# Patient Record
Sex: FEMALE | Race: WHITE | Employment: OTHER | URBAN - NONMETROPOLITAN AREA
[De-identification: names, ages, dates, MRNs, and addresses within clinical notes are randomized per-mention and may not be internally consistent; named-entity substitution may affect disease eponyms.]

---

## 2017-01-12 ENCOUNTER — TELEPHONE (OUTPATIENT)
Dept: OTHER | Age: 67
End: 2017-01-12

## 2017-01-12 ENCOUNTER — OFFICE VISIT (OUTPATIENT)
Dept: OTHER | Age: 67
End: 2017-01-12

## 2017-01-12 VITALS
WEIGHT: 115.6 LBS | HEART RATE: 51 BPM | DIASTOLIC BLOOD PRESSURE: 52 MMHG | BODY MASS INDEX: 24.16 KG/M2 | SYSTOLIC BLOOD PRESSURE: 91 MMHG

## 2017-01-12 DIAGNOSIS — E10.8 TYPE 1 DIABETES MELLITUS WITH COMPLICATION (HCC): Primary | ICD-10-CM

## 2017-01-12 PROCEDURE — 3044F HG A1C LEVEL LT 7.0%: CPT | Performed by: NURSE PRACTITIONER

## 2017-01-12 PROCEDURE — G8427 DOCREV CUR MEDS BY ELIG CLIN: HCPCS | Performed by: NURSE PRACTITIONER

## 2017-01-12 PROCEDURE — G8400 PT W/DXA NO RESULTS DOC: HCPCS | Performed by: NURSE PRACTITIONER

## 2017-01-12 PROCEDURE — 1036F TOBACCO NON-USER: CPT | Performed by: NURSE PRACTITIONER

## 2017-01-12 PROCEDURE — 3014F SCREEN MAMMO DOC REV: CPT | Performed by: NURSE PRACTITIONER

## 2017-01-12 PROCEDURE — G8482 FLU IMMUNIZE ORDER/ADMIN: HCPCS | Performed by: NURSE PRACTITIONER

## 2017-01-12 PROCEDURE — 4040F PNEUMOC VAC/ADMIN/RCVD: CPT | Performed by: NURSE PRACTITIONER

## 2017-01-12 PROCEDURE — 1090F PRES/ABSN URINE INCON ASSESS: CPT | Performed by: NURSE PRACTITIONER

## 2017-01-12 PROCEDURE — G8420 CALC BMI NORM PARAMETERS: HCPCS | Performed by: NURSE PRACTITIONER

## 2017-01-12 PROCEDURE — 99214 OFFICE O/P EST MOD 30 MIN: CPT | Performed by: NURSE PRACTITIONER

## 2017-01-12 PROCEDURE — 1123F ACP DISCUSS/DSCN MKR DOCD: CPT | Performed by: NURSE PRACTITIONER

## 2017-01-12 PROCEDURE — 3017F COLORECTAL CA SCREEN DOC REV: CPT | Performed by: NURSE PRACTITIONER

## 2017-01-12 ASSESSMENT — ENCOUNTER SYMPTOMS
WHEEZING: 0
COUGH: 0
SHORTNESS OF BREATH: 0
ABDOMINAL PAIN: 0
CHEST TIGHTNESS: 0
PHOTOPHOBIA: 0
EYE PAIN: 0
STRIDOR: 0
ABDOMINAL DISTENTION: 0

## 2017-01-25 ENCOUNTER — PATIENT MESSAGE (OUTPATIENT)
Dept: OTHER | Age: 67
End: 2017-01-25

## 2017-02-13 LAB
ALBUMIN SERPL-MCNC: 3.8 G/DL
ALP BLD-CCNC: 28 U/L
ALT SERPL-CCNC: 26 U/L
AST SERPL-CCNC: 43 U/L
BILIRUB SERPL-MCNC: 0.8 MG/DL (ref 0.1–1.4)
BUN BLDV-MCNC: 21 MG/DL
CALCIUM SERPL-MCNC: 9.7 MG/DL
CHLORIDE BLD-SCNC: 104 MMOL/L
CO2: 29 MMOL/L
CREAT SERPL-MCNC: 1.4 MG/DL
CREATININE, RANDOM URINE: 65.6
GFR CALCULATED: 38
GLUCOSE BLD-MCNC: 119 MG/DL
HBA1C MFR BLD: 5.7 %
POTASSIUM SERPL-SCNC: 4.2 MMOL/L
PROTEIN, URINE, RANDOM: <6
SODIUM BLD-SCNC: 142 MMOL/L
TOTAL PROTEIN: 6
TSH SERPL DL<=0.05 MIU/L-ACNC: 59.28 UIU/ML

## 2017-03-09 ENCOUNTER — OFFICE VISIT (OUTPATIENT)
Dept: OTHER | Age: 67
End: 2017-03-09

## 2017-03-09 VITALS
WEIGHT: 117.6 LBS | BODY MASS INDEX: 24.68 KG/M2 | SYSTOLIC BLOOD PRESSURE: 106 MMHG | HEIGHT: 58 IN | DIASTOLIC BLOOD PRESSURE: 58 MMHG

## 2017-03-09 DIAGNOSIS — E10.8 TYPE 1 DIABETES MELLITUS WITH COMPLICATION (HCC): Primary | ICD-10-CM

## 2017-03-29 ENCOUNTER — TELEPHONE (OUTPATIENT)
Dept: OTHER | Age: 67
End: 2017-03-29

## 2017-06-28 ENCOUNTER — OFFICE VISIT (OUTPATIENT)
Dept: OTHER | Age: 67
End: 2017-06-28

## 2017-06-28 DIAGNOSIS — E10.8 TYPE 1 DIABETES MELLITUS WITH COMPLICATION (HCC): Primary | ICD-10-CM

## 2017-06-29 VITALS
WEIGHT: 114.8 LBS | HEIGHT: 58 IN | DIASTOLIC BLOOD PRESSURE: 64 MMHG | BODY MASS INDEX: 24.1 KG/M2 | SYSTOLIC BLOOD PRESSURE: 116 MMHG

## 2017-06-29 DIAGNOSIS — E10.8 TYPE 1 DIABETES MELLITUS WITH COMPLICATION (HCC): Primary | ICD-10-CM

## 2017-08-24 LAB
ALT SERPL-CCNC: 28 U/L
AST SERPL-CCNC: 49 U/L
AVERAGE GLUCOSE: NORMAL
BASOPHILS ABSOLUTE: ABNORMAL /ΜL
BASOPHILS RELATIVE PERCENT: ABNORMAL %
BUN BLDV-MCNC: 25 MG/DL
CALCIUM SERPL-MCNC: 38 MG/DL
CHLORIDE BLD-SCNC: 107 MMOL/L
CHOLESTEROL, TOTAL: 103 MG/DL
CHOLESTEROL/HDL RATIO: NORMAL
CO2: NORMAL MMOL/L
CREAT SERPL-MCNC: 1.4 MG/DL
EOSINOPHILS ABSOLUTE: ABNORMAL /ΜL
EOSINOPHILS RELATIVE PERCENT: ABNORMAL %
GFR CALCULATED: NORMAL
GLUCOSE BLD-MCNC: 97 MG/DL
HBA1C MFR BLD: 5.9 %
HCT VFR BLD CALC: 34.5 % (ref 36–46)
HDLC SERPL-MCNC: 57 MG/DL (ref 35–70)
HEMOGLOBIN: 12 G/DL (ref 12–16)
LDL CHOLESTEROL CALCULATED: 30 MG/DL (ref 0–160)
LYMPHOCYTES ABSOLUTE: ABNORMAL /ΜL
LYMPHOCYTES RELATIVE PERCENT: ABNORMAL %
MCH RBC QN AUTO: ABNORMAL PG
MCHC RBC AUTO-ENTMCNC: ABNORMAL G/DL
MCV RBC AUTO: ABNORMAL FL
MONOCYTES ABSOLUTE: ABNORMAL /ΜL
MONOCYTES RELATIVE PERCENT: ABNORMAL %
NEUTROPHILS ABSOLUTE: ABNORMAL /ΜL
NEUTROPHILS RELATIVE PERCENT: ABNORMAL %
PLATELET # BLD: 135 K/ΜL
PMV BLD AUTO: ABNORMAL FL
POTASSIUM SERPL-SCNC: 4.4 MMOL/L
RBC # BLD: ABNORMAL 10^6/ΜL
SODIUM BLD-SCNC: 145 MMOL/L
TRIGL SERPL-MCNC: 81 MG/DL
VLDLC SERPL CALC-MCNC: NORMAL MG/DL
WBC # BLD: 3.9 10^3/ML

## 2017-10-30 ENCOUNTER — HOSPITAL ENCOUNTER (OUTPATIENT)
Dept: PHARMACY | Age: 67
Setting detail: THERAPIES SERIES
Discharge: HOME | End: 2017-10-30
Payer: MEDICARE

## 2017-10-30 VITALS
WEIGHT: 110.8 LBS | BODY MASS INDEX: 23.26 KG/M2 | HEIGHT: 58 IN | SYSTOLIC BLOOD PRESSURE: 104 MMHG | DIASTOLIC BLOOD PRESSURE: 52 MMHG

## 2017-10-30 PROCEDURE — G0108 DIAB MANAGE TRN  PER INDIV: HCPCS | Performed by: REGISTERED NURSE

## 2017-10-30 RX ORDER — RANITIDINE 150 MG/1
150 TABLET ORAL 2 TIMES DAILY
COMMUNITY
End: 2019-03-13 | Stop reason: ALTCHOICE

## 2017-10-30 NOTE — PROGRESS NOTES
of toe knuckles -no redness noted. She does have mild hammer toes bilat  Immunizations up to date: yes - 2017  Taking ASA:  Yes   Appropriate for use of MyChart Glucose Grid:  No    Focus: Folllow up visit. Glucose levels are in tight control; recent A1C 5.9%. Samantha Lewis states she does not want the numbers any higher. Discussed safety (risk of low BS) especially during the night. Tobin Saha agreed to small decrease in basal insulin during the night. Samantha Lewis reports sleeping poorly and appetite is poor. Weight is down approx 4# from last visit. Encouraged further discussion with PCP- ? sleep aid? Re-evaluate depression? Encouragement given. Follow up 3 months. DSME PLAN:   Discussed general issues about diabetes pathophysiology and management. Counseling at today's visit: BG goals; preventing and treating low BS; interaction of mental and physical health. 1. Decrease basal rate 2am 0.15units and 5:30am  0.65 units  2. Try to get on a schedule  -- up by 10am and to bed by 1-2am.                          Try to get a 30  Minute nap                        When [employer] gets you up at 10am--get out of bed right away  3. Set a goal to ride your bike for 10-15 minutes every day. Slow/ steady pace  4. Talk to Dr. Cleopatra Barros --do you need a sleep aid? Consider seeing a psychiatrist or phycologist to be sure your depression is in good control    Meter download, medications, PMH and nursing assessment reviewed with Huber Sanchez, Pharm Melany Turner states She is willing to participate in this plan of care and verbalized understanding of all instructions provided. Teach back used to verify comprehension. Total time involved in direct patient education: 60 minutes.

## 2018-04-04 LAB
ALBUMIN SERPL-MCNC: 3.8 G/DL
ALP BLD-CCNC: 36 U/L
ALT SERPL-CCNC: 23 U/L
ANION GAP SERPL CALCULATED.3IONS-SCNC: NORMAL MMOL/L
AST SERPL-CCNC: 44 U/L
AVERAGE GLUCOSE: 111
BASOPHILS ABSOLUTE: ABNORMAL /ΜL
BASOPHILS RELATIVE PERCENT: 1.1 %
BILIRUB SERPL-MCNC: 1.1 MG/DL (ref 0.1–1.4)
BUN BLDV-MCNC: 24 MG/DL
CALCIUM SERPL-MCNC: 9.7 MG/DL
CHLORIDE BLD-SCNC: 104 MMOL/L
CHOLESTEROL, TOTAL: 108 MG/DL
CHOLESTEROL/HDL RATIO: ABNORMAL
CO2: 29 MMOL/L
CREAT SERPL-MCNC: 1.6 MG/DL
EOSINOPHILS ABSOLUTE: ABNORMAL /ΜL
EOSINOPHILS RELATIVE PERCENT: 3 %
GFR CALCULATED: 31
GLUCOSE BLD-MCNC: 120 MG/DL
HBA1C MFR BLD: 5.7 %
HCT VFR BLD CALC: 35.4 % (ref 36–46)
HDLC SERPL-MCNC: 34 MG/DL (ref 35–70)
HEMOGLOBIN: 11.9 G/DL (ref 12–16)
LDL CHOLESTEROL CALCULATED: 58 MG/DL (ref 0–160)
LYMPHOCYTES ABSOLUTE: ABNORMAL /ΜL
LYMPHOCYTES RELATIVE PERCENT: 32.9 %
MCH RBC QN AUTO: 31.4 PG
MCHC RBC AUTO-ENTMCNC: 33.6 G/DL
MCV RBC AUTO: 93.6 FL
MONOCYTES ABSOLUTE: ABNORMAL /ΜL
MONOCYTES RELATIVE PERCENT: 10.3 %
NEUTROPHILS ABSOLUTE: ABNORMAL /ΜL
NEUTROPHILS RELATIVE PERCENT: 52.7 %
PDW BLD-RTO: 13.5 %
PLATELET # BLD: 140 K/ΜL
PMV BLD AUTO: ABNORMAL FL
POTASSIUM SERPL-SCNC: 4.7 MMOL/L
RBC # BLD: 3.78 10^6/ΜL
SODIUM BLD-SCNC: 138 MMOL/L
TOTAL PROTEIN: 6.1
TRIGL SERPL-MCNC: 83 MG/DL
VLDLC SERPL CALC-MCNC: ABNORMAL MG/DL
WBC # BLD: 4.6 10^3/ML

## 2018-08-21 ENCOUNTER — TELEPHONE (OUTPATIENT)
Dept: INTERNAL MEDICINE CLINIC | Age: 68
End: 2018-08-21

## 2018-08-22 ENCOUNTER — OFFICE VISIT (OUTPATIENT)
Dept: INTERNAL MEDICINE CLINIC | Age: 68
End: 2018-08-22
Payer: MEDICARE

## 2018-08-22 VITALS
DIASTOLIC BLOOD PRESSURE: 68 MMHG | SYSTOLIC BLOOD PRESSURE: 118 MMHG | BODY MASS INDEX: 23.89 KG/M2 | WEIGHT: 113.8 LBS | HEIGHT: 58 IN

## 2018-08-22 DIAGNOSIS — E10.8 TYPE 1 DIABETES MELLITUS WITH COMPLICATION (HCC): ICD-10-CM

## 2018-08-22 PROCEDURE — 99999 PR OFFICE/OUTPT VISIT,PROCEDURE ONLY: CPT | Performed by: NURSE PRACTITIONER

## 2018-08-22 PROCEDURE — G0108 DIAB MANAGE TRN  PER INDIV: HCPCS | Performed by: NURSE PRACTITIONER

## 2018-08-22 NOTE — PROGRESS NOTES
[facility]  [address] [address] [place]ana Pinto, [address]  [phone] (phone)  [phone] (fax)    Patient ID: Janne Burkitt 1950  Referring Provider: Dr. Jeana Zheng     Patient's name and  were verified. Subjective:    She presents for Her follow-up diabetic visit. She has type 1 diabetes mellitus. Home regimen includes: insulin She is compliant most of the time. Assessment:     Lab Results   Component Value Date    LABA1C 5.9 2017    BUN 25 2017    CREATININE 1.4 2017    CREATININE 1.4 2013     Vitals:    18 1200   BP: 118/68   Site: Left Arm   Position: Sitting   Weight: 113 lb 12.8 oz (51.6 kg)   Height: 4' 10\" (1.473 m)     Wt Readings from Last 3 Encounters:   18 113 lb 12.8 oz (51.6 kg)   10/30/17 110 lb 12.8 oz (50.3 kg)   17 114 lb 12.8 oz (52.1 kg)     Ht Readings from Last 3 Encounters:   18 4' 10\" (1.473 m)   10/30/17 4' 10\" (1.473 m)   17 4' 10\" (1.473 m)       Current monitoring regimen: home blood tests - 4-5 times daily  Home blood sugar trends: see insulin pump download  Any episodes of hypoglycemia? yes - 3 times per week; timing random with highest risk 2-3 hrpp dinner  Previous visit with dietician: remote  Current diet: B-Belvita bar                       L- usually skips or eats celery/ p butter                       D- 5-6pm meat/ potato   Raher liana                       Bedtime snack celery/p butter  Current exercise: ADL's-low to moderate activity. Does not get out of the house much  Eye exam current (within one year): yes  2018 Dr. Ignacia Shaver  Any history of foot problems? no  Last foot exam: 18 Pedal pulses:   peripheral pulses symmetrical   Results of monofilament test: 10/10              Skin noted to be warm, pale and hair is absent. Mild hammer toes. Immunizations up to date: yes -   Taking ASA:  Yes   Appropriate for use of MyChart Glucose Grid:  No    Focus:      Follow up insulin pump

## 2018-09-05 ENCOUNTER — TELEPHONE (OUTPATIENT)
Dept: INTERNAL MEDICINE CLINIC | Age: 68
End: 2018-09-05

## 2018-09-24 ENCOUNTER — OFFICE VISIT (OUTPATIENT)
Dept: INTERNAL MEDICINE CLINIC | Age: 68
End: 2018-09-24
Payer: MEDICARE

## 2018-09-24 DIAGNOSIS — E10.8 TYPE 1 DIABETES MELLITUS WITH COMPLICATION (HCC): ICD-10-CM

## 2018-09-24 PROCEDURE — 99999 PR OFFICE/OUTPT VISIT,PROCEDURE ONLY: CPT | Performed by: INTERNAL MEDICINE

## 2018-09-24 PROCEDURE — 95249 CONT GLUC MNTR PT PROV EQP: CPT | Performed by: INTERNAL MEDICINE

## 2018-09-25 ENCOUNTER — TELEPHONE (OUTPATIENT)
Dept: INTERNAL MEDICINE CLINIC | Age: 68
End: 2018-09-25

## 2018-11-19 ENCOUNTER — OFFICE VISIT (OUTPATIENT)
Dept: INTERNAL MEDICINE CLINIC | Age: 68
End: 2018-11-19
Payer: MEDICARE

## 2018-11-19 VITALS — BODY MASS INDEX: 24.18 KG/M2 | HEIGHT: 58 IN | WEIGHT: 115.2 LBS

## 2018-11-19 DIAGNOSIS — E10.8 TYPE 1 DIABETES MELLITUS WITH COMPLICATION (HCC): ICD-10-CM

## 2018-11-19 PROCEDURE — G0108 DIAB MANAGE TRN  PER INDIV: HCPCS | Performed by: INTERNAL MEDICINE

## 2018-11-19 NOTE — PROGRESS NOTES
calibrations. She would like to upgrade her insulin pump to the Omnipod pump with no tubing. Will initiate a request for this therapy. Much encouragement given. Encouraged follow up with PCP for low energy levels. Follow up visit 2 months. DSME PLAN:   Discussed general issues about diabetes pathophysiology and management. Counseling at today's visit: BG goals; use of Dexcom/calibrations; pump use; treating low BS. 1. Make sure you do a calibration every morning before breakfast, before lunch and before bedtime               Snack                 You have to calibrate every 12 hours--as long as you do regular calibrations, you will not get               Warnings other times of the day      *it is important to calibrate when your blood sugar is stable  2. Try to be active each day  3. Low blood sugar = treat with fast glucose = skittles or honey or fruit juice              Sensor reading BS 80's and dropping = maybe couple of skittles and peanut butter crackers               Sensor is  and dropping = peanut butter crackers  Any questions, call [phone]  We will check with Omnipod regarding status    Meter download, medications, PMH and nursing assessment reviewed. [place] Diaz states She is willing to participate in this plan of care and verbalized understanding of all instructions provided. Teach back used to verify comprehension. Total time involved in direct patient education: 30 minutes.

## 2019-01-25 LAB
ALBUMIN SERPL-MCNC: 3.7 G/DL
ALP BLD-CCNC: 37 U/L
ALT SERPL-CCNC: 21 U/L
ANION GAP SERPL CALCULATED.3IONS-SCNC: NORMAL MMOL/L
AST SERPL-CCNC: 44 U/L
AVERAGE GLUCOSE: 114
BASOPHILS ABSOLUTE: NORMAL /ΜL
BASOPHILS RELATIVE PERCENT: 1.8 %
BILIRUB SERPL-MCNC: 0.6 MG/DL (ref 0.1–1.4)
BUN BLDV-MCNC: 23 MG/DL
CALCIUM SERPL-MCNC: 9.5 MG/DL
CHLORIDE BLD-SCNC: 103 MMOL/L
CHOLESTEROL, TOTAL: 107 MG/DL
CHOLESTEROL/HDL RATIO: 77
CO2: 29 MMOL/L
CREAT SERPL-MCNC: 1.5 MG/DL
EOSINOPHILS ABSOLUTE: NORMAL /ΜL
EOSINOPHILS RELATIVE PERCENT: 4.7 %
GFR CALCULATED: 33
GLUCOSE BLD-MCNC: 93 MG/DL
HBA1C MFR BLD: 5.8 %
HCT VFR BLD CALC: 36.2 % (ref 36–46)
HDLC SERPL-MCNC: 30 MG/DL (ref 35–70)
HEMOGLOBIN: 12.1 G/DL (ref 12–16)
LDL CHOLESTEROL CALCULATED: 56 MG/DL (ref 0–160)
LYMPHOCYTES ABSOLUTE: NORMAL /ΜL
LYMPHOCYTES RELATIVE PERCENT: 34.9 %
MCH RBC QN AUTO: 31.8 PG
MCHC RBC AUTO-ENTMCNC: 33.4 G/DL
MCV RBC AUTO: 95.1 FL
MONOCYTES ABSOLUTE: NORMAL /ΜL
MONOCYTES RELATIVE PERCENT: 9.3 %
NEUTROPHILS ABSOLUTE: NORMAL /ΜL
NEUTROPHILS RELATIVE PERCENT: 49.3 %
PDW BLD-RTO: 13.9 %
PLATELET # BLD: 141 K/ΜL
PMV BLD AUTO: NORMAL FL
POTASSIUM SERPL-SCNC: 4.5 MMOL/L
RBC # BLD: 3.8 10^6/ΜL
SODIUM BLD-SCNC: 140 MMOL/L
TOTAL PROTEIN: 6.1
TRIGL SERPL-MCNC: 101 MG/DL
VLDLC SERPL CALC-MCNC: ABNORMAL MG/DL
WBC # BLD: 3.4 10^3/ML

## 2019-03-13 ENCOUNTER — OFFICE VISIT (OUTPATIENT)
Dept: INTERNAL MEDICINE CLINIC | Age: 69
End: 2019-03-13
Payer: MEDICARE

## 2019-03-13 VITALS
DIASTOLIC BLOOD PRESSURE: 58 MMHG | HEIGHT: 58 IN | WEIGHT: 114.6 LBS | BODY MASS INDEX: 24.05 KG/M2 | SYSTOLIC BLOOD PRESSURE: 104 MMHG

## 2019-03-13 DIAGNOSIS — E10.8 TYPE 1 DIABETES MELLITUS WITH COMPLICATION (HCC): ICD-10-CM

## 2019-03-13 PROCEDURE — G0108 DIAB MANAGE TRN  PER INDIV: HCPCS | Performed by: INTERNAL MEDICINE

## 2019-03-13 RX ORDER — FAMOTIDINE 40 MG/1
40 TABLET, FILM COATED ORAL 2 TIMES DAILY
COMMUNITY
Start: 2019-02-28

## 2019-03-13 RX ORDER — LEVOTHYROXINE SODIUM 75 UG/1
75 CAPSULE ORAL DAILY
COMMUNITY
Start: 2019-01-29

## 2019-06-12 ENCOUNTER — OFFICE VISIT (OUTPATIENT)
Dept: INTERNAL MEDICINE CLINIC | Age: 69
End: 2019-06-12
Payer: MEDICARE

## 2019-06-12 VITALS
RESPIRATION RATE: 12 BRPM | HEIGHT: 58 IN | WEIGHT: 108 LBS | SYSTOLIC BLOOD PRESSURE: 92 MMHG | BODY MASS INDEX: 22.67 KG/M2 | HEART RATE: 72 BPM | DIASTOLIC BLOOD PRESSURE: 51 MMHG

## 2019-06-12 DIAGNOSIS — E10.8 TYPE 1 DIABETES MELLITUS WITH COMPLICATION (HCC): Primary | ICD-10-CM

## 2019-06-12 DIAGNOSIS — N18.30 CKD (CHRONIC KIDNEY DISEASE) STAGE 3, GFR 30-59 ML/MIN (HCC): ICD-10-CM

## 2019-06-12 DIAGNOSIS — E78.2 MIXED HYPERLIPIDEMIA: ICD-10-CM

## 2019-06-12 DIAGNOSIS — I10 ESSENTIAL HYPERTENSION: ICD-10-CM

## 2019-06-12 LAB — HBA1C MFR BLD: 5.9 % (ref 4.3–5.7)

## 2019-06-12 PROCEDURE — 99214 OFFICE O/P EST MOD 30 MIN: CPT | Performed by: NURSE PRACTITIONER

## 2019-06-12 PROCEDURE — 83036 HEMOGLOBIN GLYCOSYLATED A1C: CPT | Performed by: NURSE PRACTITIONER

## 2019-06-12 ASSESSMENT — PATIENT HEALTH QUESTIONNAIRE - PHQ9
1. LITTLE INTEREST OR PLEASURE IN DOING THINGS: 1
2. FEELING DOWN, DEPRESSED OR HOPELESS: 1
SUM OF ALL RESPONSES TO PHQ QUESTIONS 1-9: 2
SUM OF ALL RESPONSES TO PHQ9 QUESTIONS 1 & 2: 2
SUM OF ALL RESPONSES TO PHQ QUESTIONS 1-9: 2

## 2019-06-12 NOTE — PROGRESS NOTES
meetings of clubs or organizations: Not on file     Relationship status: Not on file    Intimate partner violence:     Fear of current or ex partner: Not on file     Emotionally abused: Not on file     Physically abused: Not on file     Forced sexual activity: Not on file   Other Topics Concern    Not on file   Social History Narrative    Not on file       Prior to Admission medications    Medication Sig Start Date End Date Taking? Authorizing Provider   famotidine (PEPCID) 40 MG tablet Take 40 mg by mouth daily 2/28/19  Yes Historical Provider, MD   levothyroxine (SYNTHROID) 50 MCG tablet Take 50 mcg by mouth daily 1/29/19  Yes Historical Provider, MD   insulin lispro (HUMALOG) 100 UNIT/ML injection vial Indications: Diabetes Medtronic Revel 723. Basal rates  12am 0.75, 2am 0.15, 5:30am  0.55, 9am 1.0,  12pm 0.65, 430pm 0.7, 7pm 0.75 Carbohydrate ratio  12am 7,  11am 10, 4:30pm 7.5. Sensitivity  12am 25, 6pm 20. BS target. 12am , 100-120 3/13/19  Yes Jane Keys MD   Multiple Vitamins-Minerals (MULTIVITAMIN ADULT PO) Take 1 tablet by mouth daily   Yes Historical Provider, MD   fexofenadine (ALLEGRA ALLERGY) 180 MG tablet Take 180 mg by mouth daily as needed    Yes Historical Provider, MD   metoprolol (LOPRESSOR) 25 MG tablet Take 0.5 tablets by mouth daily  1/1/15  Yes Historical Provider, MD   Fluticasone Propionate (FLONASE NA) 2 sprays by Nasal route daily as needed Each nostril    Yes Historical Provider, MD   aspirin EC 81 MG EC tablet Take 81 mg by mouth daily. Indications: Cardiovascular Risk Reduction   Yes Historical Provider, MD   atorvastatin (LIPITOR) 20 MG tablet Take 20 mg by mouth nightly. Indications: Increase in the Amount of Cholesterol in the Blood   Yes Historical Provider, MD   clopidogrel (PLAVIX) 75 MG tablet Take 75 mg by mouth daily.  Blood thinner    Yes Historical Provider, MD   DULoxetine (CYMBALTA) 60 MG capsule Take 60 mg by mouth daily Indications: Depression In the morning     Yes Historical Provider, MD   choline fenofibrate (TRILIPIX) 135 MG CPDR Take 135 mg by mouth daily. Indications: Increase in the Amount of Cholesterol in the Blood   Yes Historical Provider, MD   gabapentin (NEURONTIN) 300 MG capsule Take 300 mg by mouth 2 times daily. Takes up to 3 times per day if needed  Indications: Nerve Disease   Yes Historical Provider, MD   isosorbide mononitrate (IMDUR) 30 MG CR tablet Take 30 mg by mouth daily. Indications: Raised Blood Pressure   Yes Historical Provider, MD        Allergies   Allergen Reactions    Niacin And Related Nausea And Vomiting    Penicillins Other (See Comments)     Unknown reaction       Review of Systems - General ROS: negative for - chills, fever or malaise. Reports fatigue. Respiratory ROS: no cough, shortness of breath, or wheezing  Cardiovascular ROS: no chest pain or dyspnea on exertion  Gastrointestinal ROS: no abdominal pain, change in bowel habits, or black or bloody stools  Genito-Urinary ROS: no dysuria, trouble voiding, or hematuria  Musculoskeletal ROS: negative for - joint pain, joint swelling, muscle pain or muscular weakness  Neurological ROS: negative for - dizziness, gait disturbance, headaches, impaired coordination/balance, memory loss, numbness/tingling or weakness. Light headedness upon standing at times. Dermatological ROS: negative for - rash or wounds    Blood pressure (!) 92/51, pulse 72, resp. rate 12, height 4' 9.99\" (1.473 m), weight 108 lb (49 kg). Physical Examination:   Constitutional - Alert, cooperative, well groomed, and in no distress. Vital signs stable, recheck /58. Vitals:    06/12/19 0857   BP: (!) 92/51   Pulse: 72   Resp: 12     Mental status - Alert and oriented to person, place, and time. Good insight, appropriate responses, mood appropriate. Head - Atraumatic, normocephalic.    Eyes - Pupils equal and round  Ears - External ears are normal, hearing is grossly normal to speech  Mouth - Oropharynx clear, mucous membranes pink and moist, dentition intact. Neck - supple, no significant adenopathy, no JVD. Chest - No distress. No increased work of breathing. Clear to auscultation in all fields, no wheezes, rales or rhonchi, symmetric air entry. Heart - normal rate, regular rhythm, normal S1, S2, no murmurs, rubs  or gallops  Abdomen -soft, nontender, nondistended  Neurological - alert, oriented, cranial nerves II-XII intact without noted deficits, motor and sensation are grossly intact bilateral upper and lower extremities. Extremities - peripheral pulses normal, no pedal edema, no clubbing or cyanosis  Skin - warm and dry, no rashes, lesions, or wounds evident on exposed skin    Diagnostic Data:  I have reviewed recent diagnostic testing including labs, EKG, radiology results. Please see EKG for interpretation.     Lab Results   Component Value Date     01/25/2019    K 4.5 01/25/2019     01/25/2019    CO2 29 01/25/2019    BUN 23 01/25/2019    CREATININE 1.5 01/25/2019    CREATININE 1.4 05/07/2013    GLUCOSE 93 01/25/2019    CALCIUM 9.5 01/25/2019      Lab Results   Component Value Date    LABA1C 5.9 (H) 06/12/2019     No results found for: EAG  Lab Results   Component Value Date    CHOL 107 01/25/2019    CHOL 108 04/04/2018    CHOL 103 08/24/2017     Lab Results   Component Value Date    TRIG 101 01/25/2019    TRIG 83 04/04/2018    TRIG 81 08/24/2017     Lab Results   Component Value Date    HDL 30 (A) 01/25/2019    HDL 34 (A) 04/04/2018    HDL 57 08/24/2017     Lab Results   Component Value Date    LDLCALC 56 01/25/2019    LDLCALC 58 04/04/2018    LDLCALC 30 08/24/2017     No results found for: LABVLDL, VLDL  Lab Results   Component Value Date    CHOLHDLRATIO 77 01/25/2019     Lab Results   Component Value Date    WBC 3.4 01/25/2019    HGB 12.1 01/25/2019    HCT 36.2 01/25/2019    MCV 95.1 01/25/2019     01/25/2019         Assessment/Plan:   Diagnosis Orders 1. Type 1 diabetes mellitus with complication (HCC)  POCT glycosylated hemoglobin (Hb A1C)    07675 - Collection Capillary Blood Specimen   2. Essential hypertension     3. Mixed hyperlipidemia     4. CKD (chronic kidney disease) stage 3, GFR 30-59 ml/min ([facility])         Orders Placed This Encounter   Procedures    POCT glycosylated hemoglobin (Hb A1C)    92907 - Collection Capillary Blood Specimen     A1C 5.9, very tightly controlled, most fasting glucose around 70-90 with some fluctuation through the day time. Make sure to get glucose checks and bolus for meals. Reduce 4900 and 0000 basal rate to 0.650. Reduce 0200 to 0.10. See Alejandrina Dallas in a month, me in 3 months  If any lows, call. Yearly diabetic eye exam    Diabetic foot care is important in order to avoid foot wounds:    Never go barefoot even at home. Test your bathwater temperature before you step in. Nails should be trimmed to the shape of the toe. Wash and check your feet for new skin changes every day. Socks should fit snugly, not be tight and be changed every day. Shoes should fit snugly, neither tight nor loose. If deformities or wounds you may need special fitted shoes. Follow up with PCP or cardiology concerning BP if this remains low despite increased fluids. Electronically signed by JAKE Degroot CNP 06/22/19 8:48 PM     [address]  [place], [address]     Phone number: [phone]  Fax number: [phone]

## 2019-07-24 ENCOUNTER — OFFICE VISIT (OUTPATIENT)
Dept: INTERNAL MEDICINE CLINIC | Age: 69
End: 2019-07-24
Payer: MEDICARE

## 2019-07-24 VITALS — BODY MASS INDEX: 22.68 KG/M2 | SYSTOLIC BLOOD PRESSURE: 102 MMHG | DIASTOLIC BLOOD PRESSURE: 58 MMHG | WEIGHT: 108.5 LBS

## 2019-07-24 DIAGNOSIS — E10.8 TYPE 1 DIABETES MELLITUS WITH COMPLICATION (HCC): ICD-10-CM

## 2019-07-24 PROCEDURE — G0108 DIAB MANAGE TRN  PER INDIV: HCPCS | Performed by: INTERNAL MEDICINE

## 2019-07-24 RX ORDER — NITROGLYCERIN 0.4 MG/1
0.4 TABLET SUBLINGUAL PRN
Refills: 6 | COMMUNITY
Start: 2019-05-02

## 2019-07-24 RX ORDER — PANTOPRAZOLE SODIUM 40 MG/1
40 TABLET, DELAYED RELEASE ORAL DAILY
Refills: 1 | COMMUNITY
Start: 2019-05-22

## 2019-08-14 ENCOUNTER — TELEPHONE (OUTPATIENT)
Dept: INTERNAL MEDICINE CLINIC | Age: 69
End: 2019-08-14

## 2019-09-24 ENCOUNTER — OFFICE VISIT (OUTPATIENT)
Dept: INTERNAL MEDICINE CLINIC | Age: 69
End: 2019-09-24
Payer: MEDICARE

## 2019-09-24 VITALS
HEART RATE: 72 BPM | SYSTOLIC BLOOD PRESSURE: 130 MMHG | BODY MASS INDEX: 23.51 KG/M2 | HEIGHT: 58 IN | WEIGHT: 112 LBS | DIASTOLIC BLOOD PRESSURE: 80 MMHG

## 2019-09-24 DIAGNOSIS — E10.8 TYPE 1 DIABETES MELLITUS WITH COMPLICATION (HCC): Primary | ICD-10-CM

## 2019-09-24 DIAGNOSIS — I10 ESSENTIAL HYPERTENSION: ICD-10-CM

## 2019-09-24 DIAGNOSIS — E78.2 MIXED HYPERLIPIDEMIA: ICD-10-CM

## 2019-09-24 DIAGNOSIS — N18.30 CKD (CHRONIC KIDNEY DISEASE) STAGE 3, GFR 30-59 ML/MIN (HCC): ICD-10-CM

## 2019-09-24 LAB — HBA1C MFR BLD: 6 % (ref 4.3–5.7)

## 2019-09-24 PROCEDURE — 3017F COLORECTAL CA SCREEN DOC REV: CPT | Performed by: NURSE PRACTITIONER

## 2019-09-24 PROCEDURE — G8420 CALC BMI NORM PARAMETERS: HCPCS | Performed by: NURSE PRACTITIONER

## 2019-09-24 PROCEDURE — 99214 OFFICE O/P EST MOD 30 MIN: CPT | Performed by: NURSE PRACTITIONER

## 2019-09-24 PROCEDURE — 1123F ACP DISCUSS/DSCN MKR DOCD: CPT | Performed by: NURSE PRACTITIONER

## 2019-09-24 PROCEDURE — G8400 PT W/DXA NO RESULTS DOC: HCPCS | Performed by: NURSE PRACTITIONER

## 2019-09-24 PROCEDURE — 1090F PRES/ABSN URINE INCON ASSESS: CPT | Performed by: NURSE PRACTITIONER

## 2019-09-24 PROCEDURE — G8427 DOCREV CUR MEDS BY ELIG CLIN: HCPCS | Performed by: NURSE PRACTITIONER

## 2019-09-24 PROCEDURE — 2022F DILAT RTA XM EVC RTNOPTHY: CPT | Performed by: NURSE PRACTITIONER

## 2019-09-24 PROCEDURE — 1036F TOBACCO NON-USER: CPT | Performed by: NURSE PRACTITIONER

## 2019-09-24 PROCEDURE — 83036 HEMOGLOBIN GLYCOSYLATED A1C: CPT | Performed by: NURSE PRACTITIONER

## 2019-09-24 PROCEDURE — 4040F PNEUMOC VAC/ADMIN/RCVD: CPT | Performed by: NURSE PRACTITIONER

## 2019-09-24 PROCEDURE — 3044F HG A1C LEVEL LT 7.0%: CPT | Performed by: NURSE PRACTITIONER

## 2019-09-24 ASSESSMENT — ENCOUNTER SYMPTOMS
TROUBLE SWALLOWING: 0
ABDOMINAL PAIN: 0
COUGH: 0
SHORTNESS OF BREATH: 0
CHOKING: 0
EYE ITCHING: 0
NAUSEA: 0
VOMITING: 0
SORE THROAT: 0
CONSTIPATION: 0
DIARRHEA: 0

## 2019-10-07 ENCOUNTER — OFFICE VISIT (OUTPATIENT)
Dept: INTERNAL MEDICINE CLINIC | Age: 69
End: 2019-10-07
Payer: MEDICARE

## 2019-10-07 VITALS — BODY MASS INDEX: 24.04 KG/M2 | WEIGHT: 115 LBS

## 2019-10-07 DIAGNOSIS — E10.8 TYPE 1 DIABETES MELLITUS WITH COMPLICATION (HCC): Primary | ICD-10-CM

## 2019-10-07 PROCEDURE — 97802 MEDICAL NUTRITION INDIV IN: CPT | Performed by: DIETITIAN, REGISTERED

## 2019-10-24 ENCOUNTER — OFFICE VISIT (OUTPATIENT)
Dept: INTERNAL MEDICINE CLINIC | Age: 69
End: 2019-10-24
Payer: MEDICARE

## 2019-10-24 VITALS
DIASTOLIC BLOOD PRESSURE: 76 MMHG | HEIGHT: 58 IN | WEIGHT: 114.8 LBS | SYSTOLIC BLOOD PRESSURE: 128 MMHG | BODY MASS INDEX: 24.1 KG/M2

## 2019-10-24 DIAGNOSIS — E10.8 TYPE 1 DIABETES MELLITUS WITH COMPLICATION (HCC): ICD-10-CM

## 2019-10-24 PROCEDURE — G0108 DIAB MANAGE TRN  PER INDIV: HCPCS | Performed by: INTERNAL MEDICINE

## 2019-11-21 ENCOUNTER — OFFICE VISIT (OUTPATIENT)
Dept: INTERNAL MEDICINE CLINIC | Age: 69
End: 2019-11-21
Payer: MEDICARE

## 2019-11-21 VITALS
WEIGHT: 112.6 LBS | BODY MASS INDEX: 23.63 KG/M2 | HEIGHT: 58 IN | SYSTOLIC BLOOD PRESSURE: 118 MMHG | DIASTOLIC BLOOD PRESSURE: 74 MMHG

## 2019-11-21 DIAGNOSIS — E10.8 TYPE 1 DIABETES MELLITUS WITH COMPLICATION (HCC): ICD-10-CM

## 2019-11-21 PROCEDURE — G0108 DIAB MANAGE TRN  PER INDIV: HCPCS | Performed by: INTERNAL MEDICINE

## 2019-12-30 ENCOUNTER — OFFICE VISIT (OUTPATIENT)
Dept: INTERNAL MEDICINE CLINIC | Age: 69
End: 2019-12-30
Payer: MEDICARE

## 2019-12-30 VITALS
BODY MASS INDEX: 24.35 KG/M2 | SYSTOLIC BLOOD PRESSURE: 98 MMHG | HEART RATE: 74 BPM | WEIGHT: 116 LBS | HEIGHT: 58 IN | DIASTOLIC BLOOD PRESSURE: 50 MMHG

## 2019-12-30 DIAGNOSIS — E10.8 TYPE 1 DIABETES MELLITUS WITH COMPLICATION (HCC): Primary | ICD-10-CM

## 2019-12-30 DIAGNOSIS — E78.2 MIXED HYPERLIPIDEMIA: ICD-10-CM

## 2019-12-30 DIAGNOSIS — I10 ESSENTIAL HYPERTENSION: ICD-10-CM

## 2019-12-30 DIAGNOSIS — N18.30 CKD (CHRONIC KIDNEY DISEASE) STAGE 3, GFR 30-59 ML/MIN (HCC): ICD-10-CM

## 2019-12-30 LAB — HBA1C MFR BLD: 6 % (ref 4.3–5.7)

## 2019-12-30 PROCEDURE — 1090F PRES/ABSN URINE INCON ASSESS: CPT | Performed by: NURSE PRACTITIONER

## 2019-12-30 PROCEDURE — G8427 DOCREV CUR MEDS BY ELIG CLIN: HCPCS | Performed by: NURSE PRACTITIONER

## 2019-12-30 PROCEDURE — 99214 OFFICE O/P EST MOD 30 MIN: CPT | Performed by: NURSE PRACTITIONER

## 2019-12-30 PROCEDURE — G8420 CALC BMI NORM PARAMETERS: HCPCS | Performed by: NURSE PRACTITIONER

## 2019-12-30 PROCEDURE — 4040F PNEUMOC VAC/ADMIN/RCVD: CPT | Performed by: NURSE PRACTITIONER

## 2019-12-30 PROCEDURE — 3017F COLORECTAL CA SCREEN DOC REV: CPT | Performed by: NURSE PRACTITIONER

## 2019-12-30 PROCEDURE — 2022F DILAT RTA XM EVC RTNOPTHY: CPT | Performed by: NURSE PRACTITIONER

## 2019-12-30 PROCEDURE — 3044F HG A1C LEVEL LT 7.0%: CPT | Performed by: NURSE PRACTITIONER

## 2019-12-30 PROCEDURE — G8482 FLU IMMUNIZE ORDER/ADMIN: HCPCS | Performed by: NURSE PRACTITIONER

## 2019-12-30 PROCEDURE — 1036F TOBACCO NON-USER: CPT | Performed by: NURSE PRACTITIONER

## 2019-12-30 PROCEDURE — G8400 PT W/DXA NO RESULTS DOC: HCPCS | Performed by: NURSE PRACTITIONER

## 2019-12-30 PROCEDURE — 1123F ACP DISCUSS/DSCN MKR DOCD: CPT | Performed by: NURSE PRACTITIONER

## 2019-12-30 PROCEDURE — 83036 HEMOGLOBIN GLYCOSYLATED A1C: CPT | Performed by: NURSE PRACTITIONER

## 2019-12-31 DIAGNOSIS — E10.8 TYPE 1 DIABETES MELLITUS WITH COMPLICATION (HCC): Primary | ICD-10-CM

## 2019-12-31 RX ORDER — BLOOD-GLUCOSE TRANSMITTER
EACH MISCELLANEOUS
Qty: 1 EACH | Refills: 4 | Status: SHIPPED | OUTPATIENT
Start: 2019-12-31 | End: 2021-10-06 | Stop reason: SDUPTHER

## 2019-12-31 RX ORDER — BLOOD-GLUCOSE,RECEIVER,CONT
EACH MISCELLANEOUS
Qty: 1 DEVICE | Refills: 0 | Status: SHIPPED | OUTPATIENT
Start: 2019-12-31 | End: 2020-11-24

## 2019-12-31 RX ORDER — BLOOD-GLUCOSE SENSOR
EACH MISCELLANEOUS
Qty: 9 EACH | Refills: 2 | Status: SHIPPED | OUTPATIENT
Start: 2019-12-31 | End: 2020-10-20

## 2020-03-03 ENCOUNTER — OFFICE VISIT (OUTPATIENT)
Dept: INTERNAL MEDICINE CLINIC | Age: 70
End: 2020-03-03
Payer: MEDICARE

## 2020-03-03 VITALS
DIASTOLIC BLOOD PRESSURE: 58 MMHG | HEART RATE: 76 BPM | HEIGHT: 58 IN | SYSTOLIC BLOOD PRESSURE: 106 MMHG | BODY MASS INDEX: 23.76 KG/M2 | WEIGHT: 113.2 LBS

## 2020-03-03 PROCEDURE — G0108 DIAB MANAGE TRN  PER INDIV: HCPCS | Performed by: INTERNAL MEDICINE

## 2020-03-03 NOTE — PROGRESS NOTES
We will keep working on ways to ensure bolusing and encourage routine meal intake. We are also working to get the Dexcom G6 CGM, which really is needed to maintain katerine's safety. Much encouragement given. Follow up 2 months. DSME PLAN:   Discussed general issues about diabetes pathophysiology and management. Counseling at today's visit: BG goals; carbs; meal plan; treating low BS. 1. Continue with present insulin doses in your pump  2. Try to get 10 minutes of exercise every day --try [employer] 10 minute chair exercise  3. Always carry a treatment for low blood sugar  We will check on status of Dexcom order  Any questions, call the clinic! [phone]    Meter download, medications, PMH and nursing assessment reviewed. Jadeteresa Chencho states She is willing to participate in this plan of care and verbalized understanding of all instructions provided. Teach back used to verify comprehension. Total time involved in direct patient education: 60 minutes.

## 2020-03-03 NOTE — PATIENT INSTRUCTIONS
1. Continue with present insulin doses in your pump  2. Try to get 10 minutes of exercise every day --try Nia Barnard 10 minute chair exercise  3.  Always carry a treatment for low blood sugar  We will check on status of Dexcom order  Any questions, call the clinic! [phone]

## 2020-06-30 ENCOUNTER — OFFICE VISIT (OUTPATIENT)
Dept: INTERNAL MEDICINE CLINIC | Age: 70
End: 2020-06-30
Payer: MEDICARE

## 2020-06-30 VITALS
SYSTOLIC BLOOD PRESSURE: 115 MMHG | TEMPERATURE: 97.9 F | WEIGHT: 115 LBS | DIASTOLIC BLOOD PRESSURE: 57 MMHG | HEIGHT: 58 IN | HEART RATE: 64 BPM | BODY MASS INDEX: 24.14 KG/M2

## 2020-06-30 LAB — HBA1C MFR BLD: 5.9 % (ref 4.3–5.7)

## 2020-06-30 PROCEDURE — 83036 HEMOGLOBIN GLYCOSYLATED A1C: CPT | Performed by: NURSE PRACTITIONER

## 2020-06-30 PROCEDURE — 1090F PRES/ABSN URINE INCON ASSESS: CPT | Performed by: NURSE PRACTITIONER

## 2020-06-30 PROCEDURE — 1123F ACP DISCUSS/DSCN MKR DOCD: CPT | Performed by: NURSE PRACTITIONER

## 2020-06-30 PROCEDURE — 1036F TOBACCO NON-USER: CPT | Performed by: NURSE PRACTITIONER

## 2020-06-30 PROCEDURE — 4040F PNEUMOC VAC/ADMIN/RCVD: CPT | Performed by: NURSE PRACTITIONER

## 2020-06-30 PROCEDURE — G8400 PT W/DXA NO RESULTS DOC: HCPCS | Performed by: NURSE PRACTITIONER

## 2020-06-30 PROCEDURE — 99214 OFFICE O/P EST MOD 30 MIN: CPT | Performed by: NURSE PRACTITIONER

## 2020-06-30 PROCEDURE — 3017F COLORECTAL CA SCREEN DOC REV: CPT | Performed by: NURSE PRACTITIONER

## 2020-06-30 PROCEDURE — 3044F HG A1C LEVEL LT 7.0%: CPT | Performed by: NURSE PRACTITIONER

## 2020-06-30 PROCEDURE — G8427 DOCREV CUR MEDS BY ELIG CLIN: HCPCS | Performed by: NURSE PRACTITIONER

## 2020-06-30 PROCEDURE — G8420 CALC BMI NORM PARAMETERS: HCPCS | Performed by: NURSE PRACTITIONER

## 2020-06-30 PROCEDURE — 2022F DILAT RTA XM EVC RTNOPTHY: CPT | Performed by: NURSE PRACTITIONER

## 2020-06-30 PROCEDURE — 95251 CONT GLUC MNTR ANALYSIS I&R: CPT | Performed by: NURSE PRACTITIONER

## 2020-06-30 RX ORDER — ACETAMINOPHEN 160 MG
2000 TABLET,DISINTEGRATING ORAL DAILY
COMMUNITY

## 2020-06-30 ASSESSMENT — PATIENT HEALTH QUESTIONNAIRE - PHQ9
1. LITTLE INTEREST OR PLEASURE IN DOING THINGS: 0
2. FEELING DOWN, DEPRESSED OR HOPELESS: 0
SUM OF ALL RESPONSES TO PHQ QUESTIONS 1-9: 0
SUM OF ALL RESPONSES TO PHQ9 QUESTIONS 1 & 2: 0
SUM OF ALL RESPONSES TO PHQ QUESTIONS 1-9: 0

## 2020-06-30 NOTE — PROGRESS NOTES
[facility]  [address] [facility] [address]  Dept: [phone]  Dept Fax: [phone] : [phone]     Visit Date:  6/30/2020    Patient:  Missy Mendoza  YOB: 1950    HPI:     Chief Complaint   Patient presents with    Diabetes       DM - A1C 5.9%. On insulin pump. Has had lows. Reports overnight lows 1-2 times in the past couple weeks. Feels low around 70. States her sensor has woken her up in the past. One of these episodes occurred during eating. Has had lot more dietary indiscretions with fast food, denies SSB or sweets intake. Drinks diet pepsi or water. Denies polyuria, polydipsia. Has noticed some frequency but not burning/pain with urination. Using G6 with Medtronic pump. 58% bolus, 42% basal.     Next eye exam August.   Foot exam today        Medications    Current Outpatient Medications:     Cholecalciferol (VITAMIN D3) 50 MCG (2000 UT) CAPS, Take 2,000 Units by mouth daily, Disp: , Rfl:     Continuous Blood Gluc  (DEXCOM G6 ) ANGUS, As Directed, Disp: 1 Device, Rfl: 0    Continuous Blood Gluc Transmit (DEXCOM G6 TRANSMITTER) MISC, As Directed, Disp: 1 each, Rfl: 4    Continuous Blood Gluc Sensor (DEXCOM G6 SENSOR) MISC, Change Sensor every 10 days, Disp: 9 each, Rfl: 2    nitroGLYCERIN (NITROSTAT) 0.4 MG SL tablet, Place 0.4 mg under the tongue as needed, Disp: , Rfl: 6    famotidine (PEPCID) 40 MG tablet, Take 40 mg by mouth daily, Disp: , Rfl:     levothyroxine (SYNTHROID) 50 MCG tablet, Take 75 mcg by mouth daily , Disp: , Rfl:     insulin lispro (HUMALOG) 100 UNIT/ML injection vial, Indications: Diabetes Medtronic Revel 723. Basal rates  12am 0.75, 2am 0.15, 5:30am  0.55, 9am 1.0,  12pm 0.65, 430pm 0.7, 7pm 0.75 Carbohydrate ratio  12am 7,  11am 10, 4:30pm 7.5. Sensitivity  12am 25, 6pm 20. BS target.  12am , 100-120, Disp: 1 vial, Rfl: 3    Fluticasone reports that she has never smoked. She has never used smokeless tobacco. She reports previous alcohol use. Health Maintenance:    Health Maintenance   Topic Date Due    Hepatitis C screen  1950    DTaP/Tdap/Td vaccine (1 - Tdap) 09/02/1969    Breast cancer screen  09/02/2000    Colon cancer screen colonoscopy  09/02/2000    DEXA (modify frequency per FRAX score)  09/02/2005    Shingles Vaccine (2 of 3) 12/02/2016    Pneumococcal 65+ years Vaccine (2 of 2 - PPSV23) 05/13/2017    Annual Wellness Visit (AWV)  05/29/2019    Diabetic retinal exam  08/15/2020    Flu vaccine (1) 09/01/2020    Diabetic foot exam  06/30/2021    A1C test (Diabetic or Prediabetic)  06/30/2021    Diabetic microalbuminuria test  07/09/2021    Lipid screen  07/09/2021    Hepatitis A vaccine  Aged Out    Hib vaccine  Aged Out    Meningococcal (ACWY) vaccine  Aged Out       Subjective:      Review of Systems   Constitutional: Negative for chills, fatigue and fever. HENT: Negative for sore throat and trouble swallowing. Eyes: Negative for itching and visual disturbance. Respiratory: Negative for cough, choking and shortness of breath. Cardiovascular: Negative for chest pain and leg swelling. Gastrointestinal: Negative for abdominal pain, constipation, diarrhea, nausea and vomiting. Endocrine: Negative for cold intolerance and heat intolerance. Genitourinary: Negative for difficulty urinating and dysuria. Musculoskeletal: Negative for arthralgias and myalgias. Skin: Negative for rash and wound. Neurological: Negative for dizziness, tremors, weakness, light-headedness, numbness and headaches. Psychiatric/Behavioral: Negative for agitation, dysphoric mood, sleep disturbance and suicidal ideas. The patient is not nervous/anxious.         Objective:     BP (!) 115/57 (Site: Right Upper Arm, Position: Sitting, Cuff Size: Medium Adult)   Pulse 64   Temp 97.9 °F (36.6 °C) (Temporal)   Ht 4' 10\" (1.473 m) Wt 115 lb (52.2 kg)   BMI 24.04 kg/m²     Physical Exam  Vitals signs reviewed. Constitutional:       Appearance: She is well-developed. HENT:      Head: Normocephalic and atraumatic. Eyes:      General: No scleral icterus. Right eye: No discharge. Left eye: No discharge. Pupils: Pupils are equal, round, and reactive to light. Neck:      Musculoskeletal: Normal range of motion and neck supple. Thyroid: No thyromegaly. Cardiovascular:      Rate and Rhythm: Normal rate and regular rhythm. Heart sounds: Normal heart sounds. No murmur. No friction rub. No gallop. Pulmonary:      Effort: Pulmonary effort is normal. No respiratory distress. Breath sounds: Normal breath sounds. No wheezing or rales. Abdominal:      General: Bowel sounds are normal. There is no distension. Palpations: Abdomen is soft. Tenderness: There is no abdominal tenderness. Musculoskeletal: Normal range of motion. General: No tenderness or deformity. Lymphadenopathy:      Cervical: No cervical adenopathy. Skin:     General: Skin is warm and dry. Coloration: Skin is not pale. Findings: No erythema. Neurological:      Mental Status: She is alert and oriented to person, place, and time. Cranial Nerves: No cranial nerve deficit. DIABETIC FOOT EXAM  Visual inspection:  Deformity/amputation: absent  Skin lesions/pre-ulcerative calluses: Hypertrophic nails.   absent  Edema: right- negative, left- negative    Sensory exam:  Monofilament sensation: normal  (minimum of 5 random plantar locations tested, avoiding callused areas - > 1 area with absence of sensation is + for neuropathy)    Plus at least one of the following:  Pulses: normal,   Pinprick: N/A  Proprioception: N/A  Vibration (128 Hz): N/A      Labs Reviewed 6/30/2020:    Lab Results   Component Value Date    WBC 3.4 01/25/2019    HGB 12.1 01/25/2019    HCT 36.2 01/25/2019     01/25/2019    CHOL 107 01/25/2019    TRIG 101 01/25/2019    HDL 30 (A) 01/25/2019    ALT 21 01/25/2019    AST 44 01/25/2019     01/25/2019    K 4.5 01/25/2019     01/25/2019    CREATININE 1.5 01/25/2019    BUN 23 01/25/2019    CO2 29 01/25/2019    TSH 59.28 02/13/2017    LABA1C 5.9 (H) 06/30/2020       Assessment/Plan      1. Type 1 diabetes mellitus with complication (HCC)  Adjusted pump insulin settings due to lows. See CGM download for details, reduced basal settings having overnight lows, leading to highs in am. Also reduced carb ratios slightly. Download pump and meter in one week. Call for further lows, or any uncontrolled highs. Keep appt with Lindsay Cuevas 7/29.   - POCT glycosylated hemoglobin (Hb A1C)  - 21587 - Collection Capillary Blood Specimen  - Microalbumin / Creatinine Urine Ratio; Future  -  DIABETES FOOT EXAM  - Comprehensive Metabolic Panel, Fasting; Future  - Lipid, Fasting; Future    2. Mixed hyperlipidemia  On Lipitor 20 mg daily. 3. Essential hypertension  BP stable. 4. CKD (chronic kidney disease) stage 3, GFR 30-59 ml/min ([facility])  Due for labs. Return in about 6 months (around 12/30/2020) for Diabetes. Patient given educational materials - see patient instructions. Discussed use, benefit, and side effects of prescribed medications. All patient questions answered. Pt voiced understanding.         Electronically signed JAKE Rocha CNP on 6/30/20 at 11:02 AM EDT

## 2020-07-14 ENCOUNTER — TELEPHONE (OUTPATIENT)
Dept: INTERNAL MEDICINE CLINIC | Age: 70
End: 2020-07-14

## 2020-07-14 NOTE — TELEPHONE ENCOUNTER
----- Message from Jonah Client, APRN - CNP sent at 7/14/2020 12:43 PM EDT -----  Stable labs, continue current medications.

## 2020-07-20 ASSESSMENT — ENCOUNTER SYMPTOMS
DIARRHEA: 0
TROUBLE SWALLOWING: 0
COUGH: 0
ABDOMINAL PAIN: 0
SORE THROAT: 0
EYE ITCHING: 0
NAUSEA: 0
CONSTIPATION: 0
SHORTNESS OF BREATH: 0
VOMITING: 0
CHOKING: 0

## 2020-07-29 ENCOUNTER — OFFICE VISIT (OUTPATIENT)
Dept: INTERNAL MEDICINE CLINIC | Age: 70
End: 2020-07-29
Payer: MEDICARE

## 2020-07-29 ENCOUNTER — TELEPHONE (OUTPATIENT)
Dept: INTERNAL MEDICINE CLINIC | Age: 70
End: 2020-07-29

## 2020-07-29 VITALS
SYSTOLIC BLOOD PRESSURE: 101 MMHG | HEIGHT: 58 IN | DIASTOLIC BLOOD PRESSURE: 50 MMHG | BODY MASS INDEX: 23.68 KG/M2 | WEIGHT: 112.8 LBS | HEART RATE: 74 BPM | TEMPERATURE: 98 F

## 2020-07-29 PROCEDURE — G0108 DIAB MANAGE TRN  PER INDIV: HCPCS | Performed by: INTERNAL MEDICINE

## 2020-07-29 RX ORDER — ESCITALOPRAM OXALATE 5 MG/1
5 TABLET ORAL DAILY
COMMUNITY
Start: 2020-07-15 | End: 2022-05-02

## 2020-07-29 NOTE — PROGRESS NOTES
[facility]  [address] [place], [address]  [phone] (phone)  [phone] (fax)    Patient ID: Allison Truong 1950  Referring Provider: Dr. Duncan Stack     Patient's name and  were verified. Subjective:    She presents for Her follow-up diabetic visit. She has type 1 diabetes mellitus. Home regimen includes: insulin She is noncompliant some of the time. Assessment:     Lab Results   Component Value Date    LABA1C 5.9 2020    LABA1C 5.8 2019    BUN 23 2019    CREATININE 1.5 2019    CREATININE 1.4 2013     There were no vitals filed for this visit. Wt Readings from Last 3 Encounters:   20 115 lb (52.2 kg)   20 113 lb 3.2 oz (51.3 kg)   19 116 lb (52.6 kg)     Ht Readings from Last 3 Encounters:   20 4' 10\" (1.473 m)   20 4' 10\" (1.473 m)   19 4' 10\" (1.473 m)       Glucose at 3 hrs PPD today resulted at 116mg/dl  Current monitoring regimen: home blood tests - 4 times daily / Dexcom CGM  Home blood sugar trends:   Any episodes of hypoglycemia? yes - 2-4 per week  Previous visit with dietician: yes -   Current diet: B- bkfst bar                       L- skip lunch 50%                       D- grilled cheese                       Snacking crackers or dips bars or protein bars  Current exercise: ADL's- low activity levels  Eye exam current (within one year): yes 2019 Dr. Micheline Vigil 2020  Any history of foot problems? no  Last foot exam: 20  Immunizations up to date: yes -   Taking ASA:  Yes   Appropriate for use of MyChart Glucose Grid:  No    Focus:     Diabetes education. Weight is stable and low blood sugars are less. She continues with some lows during the day (likely with skipped meals) and some after midnight bolus. She has some glucose spikes from missing a bolus at meal or snack time. She is not sleeping well, staying up all nights some nights and she naps throughout the day.  She is increasingly forgetful. Encouraged discussion of a set bedtime and possible sleep aid to help get to sleep. Reviewed treatment of low BS. Will check on [employer] coverage for new insulin pump. Cost is a signficiant concern. Follow up 3 months. DSME PLAN:   Discussed general issues about diabetes pathophysiology and management. Counseling at today's visit: BG goals; basal vs bolus; sleep schedule; treating low BS. Consider 12am carb ratio 8 grams carb                 9am basal rat 0.95 units and 12pm 0.6 units per hour  Talk with your family doctor about something to help you sleep              --set a goal to go to bed by 12-1am           Try to stay up more during the daytime  Try to remember to bolus for all snacks    Meter download, medications, PMH and nursing assessment reviewed. Radha Ron states She is willing to participate in this plan of care and verbalized understanding of all instructions provided. Teach back used to verify comprehension. Total time involved in direct patient education: 60 minutes.

## 2020-07-29 NOTE — PATIENT INSTRUCTIONS
Low blood sugar          --first get a pack of skittles              THEN a snack of 15 grams--3 peanut butter crackers/ peanut butter bar              IF  You eat more than that, then bolus for those extra carbs     -keep a tube of glucose get available that you can squirt in the mouth--in case             Evin Lasanjeev is no willing to chew or to drink!   Consider 12am carb ratio 8 grams carb                 9am basal rat 0.95 units and 12pm 0.6 units per hour  Talk with your family doctor about something to help you sleep              --set a goal to go to bed by 12-1am           Try to stay up more during the daytime  Try to remember to bolus for all snacks

## 2020-07-29 NOTE — TELEPHONE ENCOUNTER
Diabetes education. Low BS's are much better, but continues to have lows late morning and late afternoon. A few lows in the middle of the night that may be r/t after midnight boluses. JO Vega CNP,  Please authorize the [facility] at [address] [place] [address] to teach/ assist Kyaw Grissom to make the following pump setting changes. Carb ratio 12am from 7.5 grams to 8.0 grams  Basal rate 9am from 1.0 units to 0.95  Units per hr                 12pm from 0.65 units to 0.60 units per hr  If you agree, please note and return. Thank you.

## 2020-07-30 NOTE — TELEPHONE ENCOUNTER
Jonnie Villarreal instructed on the insulin pump changes as below  Carb ratio 12am from 7.5 grams to 8.0 grams  Basal rate 9am from 1.0 units to 0.95  Units per hr                 12pm from 0.65 units to 0.60 units per hr  RN assisted Jonnie Villarreal in making changes in insulin pump. Jonnie Villarreal voiced understanding via teach back.

## 2020-10-20 RX ORDER — BLOOD-GLUCOSE SENSOR
EACH MISCELLANEOUS
Qty: 9 EACH | Refills: 2 | Status: SHIPPED | OUTPATIENT
Start: 2020-10-20 | End: 2021-03-31 | Stop reason: SDUPTHER

## 2020-10-20 NOTE — TELEPHONE ENCOUNTER
Last visit- 7/29/2020  Next visit- 10/29/2020    Requested Prescriptions     Pending Prescriptions Disp Refills    Continuous Blood Gluc Sensor (DEXCOM G6 SENSOR) MISC [Pharmacy Med Name: [employer] (3 PACK)] 9 each 2     Sig: CHANGE SENSOR EVERY 10 DAYS

## 2020-11-16 ENCOUNTER — TELEPHONE (OUTPATIENT)
Dept: INTERNAL MEDICINE CLINIC | Age: 70
End: 2020-11-16

## 2020-11-16 NOTE — TELEPHONE ENCOUNTER
Alexandria Vázquez called and left a message asking for a return call. I called and left a message for her to call us back.

## 2020-11-17 NOTE — TELEPHONE ENCOUNTER
I called Nichole Pereira, she said she was getting a call to say her Tandem pump was ready to be shipped but when I looked in her chart, it looked like it was for an upgrade for the Medtronic pump. I called [facility] and it is for a Medtronic upgrade. Per Kylah Gomez RN, [facility]'s request, we put a hold on this. I will be calling the patient on 11/18/2020 and see if she is wanting the Tandem pump or Medtronic upgrade. The Tandem pump will communicate with the DexCom CGM she is using so I want to make sure exactly which pump she wants. I spoke with Nani Gatica at [facility] and he said we can put a hold on that request.  I will call Nichole Pereira and then let Nani Gatica know on 11/18/2020.

## 2020-11-18 NOTE — TELEPHONE ENCOUNTER
Called patient. She was unavailable to talk at that time. I asked her  to have her call back. He stated he would have her return the call.

## 2020-11-24 RX ORDER — BLOOD-GLUCOSE,RECEIVER,CONT
EACH MISCELLANEOUS
Qty: 1 DEVICE | Refills: 0 | Status: SHIPPED | OUTPATIENT
Start: 2020-11-24 | End: 2021-03-31 | Stop reason: SDUPTHER

## 2020-12-03 NOTE — TELEPHONE ENCOUNTER
Patient called back. I let her know that I spoke with the rep for Tandem and also the rep for [facility].  They will be reaching out to her to get the info from her get the pump sent out to her. I asked that she call the office when she gets her pump so we can get her set up for a pump instruct. Patient verbalized understanding.

## 2020-12-22 ENCOUNTER — OFFICE VISIT (OUTPATIENT)
Dept: INTERNAL MEDICINE CLINIC | Age: 70
End: 2020-12-22
Payer: MEDICARE

## 2020-12-22 VITALS
SYSTOLIC BLOOD PRESSURE: 129 MMHG | TEMPERATURE: 97.8 F | DIASTOLIC BLOOD PRESSURE: 59 MMHG | WEIGHT: 109.6 LBS | HEART RATE: 72 BPM | BODY MASS INDEX: 23 KG/M2 | HEIGHT: 58 IN

## 2020-12-22 LAB — HBA1C MFR BLD: 6.2 % (ref 4.3–5.7)

## 2020-12-22 PROCEDURE — G8427 DOCREV CUR MEDS BY ELIG CLIN: HCPCS | Performed by: NURSE PRACTITIONER

## 2020-12-22 PROCEDURE — G8484 FLU IMMUNIZE NO ADMIN: HCPCS | Performed by: NURSE PRACTITIONER

## 2020-12-22 PROCEDURE — 1090F PRES/ABSN URINE INCON ASSESS: CPT | Performed by: NURSE PRACTITIONER

## 2020-12-22 PROCEDURE — 3044F HG A1C LEVEL LT 7.0%: CPT | Performed by: NURSE PRACTITIONER

## 2020-12-22 PROCEDURE — G8400 PT W/DXA NO RESULTS DOC: HCPCS | Performed by: NURSE PRACTITIONER

## 2020-12-22 PROCEDURE — 1036F TOBACCO NON-USER: CPT | Performed by: NURSE PRACTITIONER

## 2020-12-22 PROCEDURE — 1123F ACP DISCUSS/DSCN MKR DOCD: CPT | Performed by: NURSE PRACTITIONER

## 2020-12-22 PROCEDURE — 99214 OFFICE O/P EST MOD 30 MIN: CPT | Performed by: NURSE PRACTITIONER

## 2020-12-22 PROCEDURE — 4040F PNEUMOC VAC/ADMIN/RCVD: CPT | Performed by: NURSE PRACTITIONER

## 2020-12-22 PROCEDURE — G8420 CALC BMI NORM PARAMETERS: HCPCS | Performed by: NURSE PRACTITIONER

## 2020-12-22 PROCEDURE — 3017F COLORECTAL CA SCREEN DOC REV: CPT | Performed by: NURSE PRACTITIONER

## 2020-12-22 PROCEDURE — 2022F DILAT RTA XM EVC RTNOPTHY: CPT | Performed by: NURSE PRACTITIONER

## 2020-12-22 PROCEDURE — 83036 HEMOGLOBIN GLYCOSYLATED A1C: CPT | Performed by: NURSE PRACTITIONER

## 2020-12-22 NOTE — PROGRESS NOTES
Ashley Villar  INTERNAL MEDICINE  [address] [facility]  [facility] [address]  Dept: [phone]  Dept Fax: [phone]  Loc: [phone]     Visit Date:  12/22/2020    Patient:  Lyndon Center Phlegm  YOB: 1950    HPI:     Chief Complaint   Patient presents with   Leonid Self Diabetes       PCP - Dr. Andrea Mccloud. 12/30/2020. T1DM - A1C 6.2%, on insulin pump. C-peptide <0.6 11/5/2020. States they got a different generic brand lispro her last fill and feel her glucose is higher due to this. Breakfast around 9-10, skips lunch, evening meal 5-6 pm.     Glucose logs and pump download with settings reviewed with patient. Fasting glucose , premeal 140-200s range per downloads which are available in media. She checks her blood glucose 4 times daily. Due for pump update. Last eye exam August - no BDR per her report. Dr. Andrea Mccloud checked feet - no wounds, no N/T. Appetite is fluctuant. Had COVID one month ago, continues with fatigue, body aches,   Left sided abd pain - since having COVID. Folows with nephrology Dr. Vargas Ernandez. Has an appt with her in March. Had labs recently for her and states reported it was ok. CAD-followed by cardiology - Dr. Dorian Amos. Medications    Current Outpatient Medications:     Continuous Blood Gluc  (DEXCOM G6 ) ANGUS, USE AS DIRECTED, Disp: 1 Device, Rfl: 0    Continuous Blood Gluc Sensor (DEXCOM G6 SENSOR) MISC, CHANGE SENSOR EVERY 10 DAYS, Disp: 9 each, Rfl: 2    insulin lispro (HUMALOG) 100 UNIT/ML injection vial, Indications: Diabetes Medtronic Revel 723. Basal rates  12am 0.75, 2am 0.15, 5:30am  0.55, 9am .95,  12pm 0.60, 430pm 0.7, 7pm 0.75 Carbohydrate ratio  12am 8,  11am 10, 4:30pm 7.5. Sensitivity  12am 25, 6pm 20. BS target.  12am , 100-120, Disp: 1 vial, Rfl: 3    escitalopram (LEXAPRO) 5 MG tablet, Take 5 mg by mouth daily, Disp: , Rfl:     Cholecalciferol (VITAMIN D3) 50 MCG (2000 UT) CAPS, Take 2,000 Units by mouth daily, Disp: , Rfl:     Continuous Blood Gluc Transmit (DEXCOM G6 TRANSMITTER) MISC, As Directed, Disp: 1 each, Rfl: 4    pantoprazole (PROTONIX) 40 MG tablet, Take 40 mg by mouth daily, Disp: , Rfl: 1    nitroGLYCERIN (NITROSTAT) 0.4 MG SL tablet, Place 0.4 mg under the tongue as needed, Disp: , Rfl: 6    famotidine (PEPCID) 40 MG tablet, Take 40 mg by mouth daily, Disp: , Rfl:     levothyroxine (SYNTHROID) 50 MCG tablet, Take 75 mcg by mouth daily , Disp: , Rfl:     Fluticasone Propionate (FLONASE NA), 2 sprays by Nasal route daily as needed Each nostril , Disp: , Rfl:     aspirin EC 81 MG EC tablet, Take 81 mg by mouth daily. Indications: Cardiovascular Risk Reduction, Disp: , Rfl:     atorvastatin (LIPITOR) 20 MG tablet, Take 20 mg by mouth nightly. Indications: Increase in the Amount of Cholesterol in the Blood, Disp: , Rfl:     clopidogrel (PLAVIX) 75 MG tablet, Take 75 mg by mouth daily. Blood thinner , Disp: , Rfl:     DULoxetine (CYMBALTA) 60 MG capsule, Take 60 mg by mouth daily Indications: Depression In the morning , Disp: , Rfl:     choline fenofibrate (TRILIPIX) 135 MG CPDR, Take 135 mg by mouth daily. Indications: Increase in the Amount of Cholesterol in the Blood, Disp: , Rfl:     gabapentin (NEURONTIN) 300 MG capsule, Take 300 mg by mouth 2 times daily. Takes up to 3 times per day if needed  Indications: Nerve Disease, Disp: , Rfl:     isosorbide mononitrate (IMDUR) 30 MG CR tablet, Take 30 mg by mouth daily. Indications: Raised Blood Pressure, Disp: , Rfl:     The patient is allergic to niacin and related and penicillins. Past Medical History  Walter Mckeon  has a past medical history of Hyperlipidemia, Hypertension, Hypothyroidism, and Type II or unspecified type diabetes mellitus without mention of complication, not stated as uncontrolled.     Past Surgical History  The patient  has a past surgical history that includes  section (.6632); Cholecystectomy (); Neck surgery (2011); Cataract removal (); vitrectomy (); and cardiovascular stress test (2013). Family History  This patient's family history includes Diabetes in her brother and sister; Emphysema in her mother; Heart Disease in her brother; High Cholesterol in her sister; Hypertension in her sister; Kidney Disease in her brother; Other in her brother and father. Social History  Mateo Acosta  reports that she has never smoked. She has never used smokeless tobacco. She reports previous alcohol use. Health Maintenance:    Health Maintenance   Topic Date Due    Hepatitis C screen  1950    DTaP/Tdap/Td vaccine (1 - Tdap) 1969    Breast cancer screen  2000    Colon cancer screen colonoscopy  2000    DEXA (modify frequency per FRAX score)  2005    Shingles Vaccine (2 of 3) 2016    Pneumococcal 65+ years Vaccine (2 of 2 - PPSV23) 2017    Annual Wellness Visit (AWV)  2019    Flu vaccine (1) 2020    Diabetic foot exam  2021    Diabetic microalbuminuria test  2021    Lipid screen  2021    Diabetic retinal exam  2021    A1C test (Diabetic or Prediabetic)  2021    Hepatitis A vaccine  Aged Out    Hib vaccine  Aged Out    Meningococcal (ACWY) vaccine  Aged Out       Subjective:      Review of Systems   Constitutional: Negative for chills, fatigue and fever. Respiratory: Negative for cough and shortness of breath. Cardiovascular: Negative for chest pain and leg swelling. Gastrointestinal: Negative for abdominal pain, constipation, diarrhea, nausea and vomiting. Endocrine: Negative for polydipsia and polyphagia. Genitourinary: Negative for difficulty urinating and dysuria. Musculoskeletal: Negative for arthralgias and myalgias. Skin: Negative for rash and wound. Neurological: Negative for numbness.        Objective:     BP (!) 129/59 (Site: Right advised. Educated patient on treating lows and to call if a pattern of lows begins to occur. Due for new pump. Yearly dilated eye exams advised. Regular foot exams, last one done by Dr. Ave Mcclain. Diabetic foot care is important in order to avoid foot wounds:    Never go barefoot even at home. Test your bathwater temperature before you step in. Nails should be trimmed to the shape of the toe. Wash and check your feet for new skin changes every day. Socks should fit snugly, not be tight and be changed every day. Shoes should fit snugly, neither tight nor loose. If deformities or wounds you may need special fitted shoes. On aspirin 81 mg daily, statin, not on ACE or ARB therapy likely due to CKD and borderline blood pressure. Last microalbumin to creatinine ratio of 10 on 7/10/2020. Follow-up in 3 months. Call sooner if problems  - POCT glycosylated hemoglobin (Hb A1C)  - 25735 - Collection Capillary Blood Specimen    2. Mixed hyperlipidemia  On Lipitor 20 mg nightly, and try lip X1 35 mg daily. Last lipids on 7/10/2020 with cholesterol of 99, triglycerides 94, HDL of 28 and LDL 52. Overall well controlled on her current regimen, increased activity advised to increase her HDL. 3. Essential hypertension  Blood pressure 129/59. Likely too low to add ACE inhibitor therapy. 4. Stage 3 chronic kidney disease, unspecified whether stage 3a or 3b CKD  Last BUN 24, creatinine 1.5, with EGFR of 34 mL/min. Also contraindication to ACE or ARB therapy at this time. Return in about 3 months (around 3/22/2021) for Diabetes. Patient given educational materials - see patient instructions. Discussed use, benefit, and side effects of prescribed medications. All patient questions answered. Pt voiced understanding.       Electronically signed JAKE Sanchez - CNP on 12/22/20 at 10:34 AM EST

## 2020-12-29 ASSESSMENT — ENCOUNTER SYMPTOMS
SHORTNESS OF BREATH: 0
NAUSEA: 0
VOMITING: 0
CONSTIPATION: 0
DIARRHEA: 0
COUGH: 0
ABDOMINAL PAIN: 0

## 2021-01-04 ENCOUNTER — OFFICE VISIT (OUTPATIENT)
Dept: INTERNAL MEDICINE CLINIC | Age: 71
End: 2021-01-04

## 2021-01-04 ENCOUNTER — TELEPHONE (OUTPATIENT)
Dept: INTERNAL MEDICINE CLINIC | Age: 71
End: 2021-01-04

## 2021-01-04 VITALS
SYSTOLIC BLOOD PRESSURE: 108 MMHG | DIASTOLIC BLOOD PRESSURE: 56 MMHG | HEIGHT: 58 IN | WEIGHT: 113 LBS | HEART RATE: 79 BPM | TEMPERATURE: 97.7 F | BODY MASS INDEX: 23.72 KG/M2

## 2021-01-04 DIAGNOSIS — E10.8 TYPE 1 DIABETES MELLITUS WITH COMPLICATION (HCC): ICD-10-CM

## 2021-01-04 PROCEDURE — G0108 DIAB MANAGE TRN  PER INDIV: HCPCS | Performed by: INTERNAL MEDICINE

## 2021-01-04 NOTE — PROGRESS NOTES
[facility]  [address] [place], [address]  [phone] (phone)  [phone] (fax)    Patient ID: Aletha Jiang 1950  Referring Provider: Dr. Napoleon Langford     Patient's name and  were verified. Subjective:    She presents for Her follow-up diabetic visit. She has type 1 diabetes mellitus. Home regimen includes: insulin She is compliant most of the time. Assessment:     Lab Results   Component Value Date    LABA1C 6.2 2020    LABA1C 5.8 2019    BUN 23 2019    CREATININE 1.5 2019    CREATININE 1.4 2013     There were no vitals filed for this visit. Wt Readings from Last 3 Encounters:   20 109 lb 9.6 oz (49.7 kg)   20 112 lb 12.8 oz (51.2 kg)   20 115 lb (52.2 kg)     Ht Readings from Last 3 Encounters:   20 4' 10\" (1.473 m)   20 4' 10\" (1.473 m)   20 4' 10\" (1.473 m)       Glucose at 3 hrs PPD today resulted at 146mg/dl  Current monitoring regimen: home blood tests - 4 times daily/ Dexcom CGM  Home blood sugar trends: see Dexcom CGM download  Any episodes of hypoglycemia? No; not recently  Previous visit with dietician: yes -   Current diet: B breakfast bar                   L apple                   D fried chicken  Current exercise: ADL's -low activity levels  Eye exam current (within one year): yes  Dr. Noelle Maynard 2020 no changes  Any history of foot problems? no  Last foot exam: 20  Immunizations up to date: yes -   Taking ASA:  Yes   Appropriate for use of MyChart Glucose Grid:  No    Focus:     Diabetes education. Recent A1C 6.2%. Swati Terry denies having any low blood sugars, which is good for her. She is having higher blood sugars after meals and could benefit from a little stronger meal coverage. She is waiting for the shipment of her Tandem insulin pump, which will help as her Dexcom will be able to communicate with the pump and provide basal IQ at least/ some protection from low BS's.  They will be on a payment plan for the Tandem pump and when the first payment is made, she can receive her pump. Lisandro Willams reports having COVID about 2 months ago. She remains fatigued. We discussed the need to set small goals to start moving more-stay strong. Much encouragement given. Follow up 3 months. DSME PLAN:   Discussed general issues about diabetes pathophysiology and management. Counseling at today's visit: BG goals before and after meals; carbs; exercise-strength; treating low BS; medtronic pump functions/ tandem pump functions. Carb ratios 12am 7.5 grams                       11am 9.5 grams                         4:30pm 8.5 grams        *if blood sugars are over 200 2 hours after eating, you could consider                   Changing carb ratio at that meal. (12am --7.0 grams or                                                         11am --9.0 grams)              --bolus menu--bolus set up--bolus wizard set up--edit settings--carb                                    Ratio  **you can change what is blinking  Try to increase activity        --move 5-10 minutes after each meal--walk down the duncan                                               Water bottles for small weights or stretch band  You want the auto soft 90 --6 mm infusion sets  To have help with medtronic menues --[employer] Medtronic 723 insulin pump                user manual  Meter download, medications, PMH and nursing assessment reviewed. Lamont Lauro states She is willing to participate in this plan of care and verbalized understanding of all instructions provided. Teach back used to verify comprehension. Total time involved in direct patient education: 60 minutes.

## 2021-01-04 NOTE — PATIENT INSTRUCTIONS
Carb ratios 12am 7.5 grams                       11am 9.5 grams                         4:30pm 8.5 grams        *if blood sugars are over 200 2 hours after eating, you could consider                   Changing carb ratio at that meal. (12am --7.0 grams or                                                         11am --9.0 grams)              --bolus menu--bolus set up--bolus wizard set up--edit settings--carb                                    Ratio  **you can change what is blinking  Try to increase activity        --move 5-10 minutes after each meal--walk down the dun[place]                                               Water bottles for small weights or stretch band  You want the auto soft 90 --6 mm infusion sets  To have help with medtronic mensahara --google [employer] 723 insulin pump                user manual

## 2021-03-31 ENCOUNTER — OFFICE VISIT (OUTPATIENT)
Dept: INTERNAL MEDICINE CLINIC | Age: 71
End: 2021-03-31

## 2021-03-31 VITALS
HEIGHT: 58 IN | SYSTOLIC BLOOD PRESSURE: 130 MMHG | WEIGHT: 115.6 LBS | BODY MASS INDEX: 24.27 KG/M2 | TEMPERATURE: 97.9 F | DIASTOLIC BLOOD PRESSURE: 60 MMHG | HEART RATE: 80 BPM

## 2021-03-31 DIAGNOSIS — I10 ESSENTIAL HYPERTENSION: ICD-10-CM

## 2021-03-31 DIAGNOSIS — E78.2 MIXED HYPERLIPIDEMIA: ICD-10-CM

## 2021-03-31 DIAGNOSIS — E10.8 TYPE 1 DIABETES MELLITUS WITH COMPLICATION (HCC): Primary | ICD-10-CM

## 2021-03-31 DIAGNOSIS — N18.30 STAGE 3 CHRONIC KIDNEY DISEASE, UNSPECIFIED WHETHER STAGE 3A OR 3B CKD (HCC): ICD-10-CM

## 2021-03-31 LAB
AVERAGE GLUCOSE: NORMAL
HBA1C MFR BLD: 6.6 %

## 2021-03-31 PROCEDURE — 99214 OFFICE O/P EST MOD 30 MIN: CPT | Performed by: NURSE PRACTITIONER

## 2021-03-31 RX ORDER — BLOOD-GLUCOSE,RECEIVER,CONT
EACH MISCELLANEOUS
Qty: 1 DEVICE | Refills: 0 | Status: SHIPPED | OUTPATIENT
Start: 2021-03-31

## 2021-03-31 RX ORDER — BLOOD-GLUCOSE SENSOR
EACH MISCELLANEOUS
Qty: 9 EACH | Refills: 2 | Status: SHIPPED | OUTPATIENT
Start: 2021-03-31 | End: 2021-04-22 | Stop reason: SDUPTHER

## 2021-03-31 SDOH — ECONOMIC STABILITY: TRANSPORTATION INSECURITY
IN THE PAST 12 MONTHS, HAS LACK OF TRANSPORTATION KEPT YOU FROM MEETINGS, WORK, OR FROM GETTING THINGS NEEDED FOR DAILY LIVING?: NO

## 2021-03-31 ASSESSMENT — PATIENT HEALTH QUESTIONNAIRE - PHQ9
SUM OF ALL RESPONSES TO PHQ QUESTIONS 1-9: 0
SUM OF ALL RESPONSES TO PHQ9 QUESTIONS 1 & 2: 0
SUM OF ALL RESPONSES TO PHQ QUESTIONS 1-9: 0

## 2021-03-31 NOTE — PATIENT INSTRUCTIONS
Be sure to use a temp basal when sick. Adjusted your basal settings due to lows. Pump download when you see [place] in April. Fasting labs are due in July, to include cholesterol level and kidney function. Let me know if you need slips for this or have them from another provider.

## 2021-03-31 NOTE — PROGRESS NOTES
[facility] INTERNAL MEDICINE  [address] [facility] [address]  Dept: [phone]  Dept Fax: [phone]  Loc: [phone]     Visit Date:  3/31/2021    Patient:  Marycruz Martinez  YOB: 1950    HPI:     Chief Complaint   Patient presents with    Diabetes     3 months  / A1c 6.6 in [facility] office last week     Hypertension    Hyperlipidemia    Chronic Kidney Disease       HPI  DM on insulin pump - A1C 6.6%. She has had a few lows due to low appetite at times, otherwise highs later in the day for the most part. States she had a couple lows during the night a couple months ago. Last eye exam - August, states stable. Last foot exam - Dr Gagan Gamboa office. Denies numbness/tinging or wounds. HTN - /60. Had [facility] in November and has been having issues since. Has continued with nausea, low appetite since then intermittently. Had a stress test with Dr. Vickie Garnica, kidney US, bladder US last week. On Pepcid. Saw GI - has a consult with Dr. Dionicio Madsen. He is out on vacation, appt April 26. She does have a history of GERD. She has been having stomach pain, previously daily, but still continues with low appetite. CKD -  From [facility].      Medications    Current Outpatient Medications:     Continuous Blood Gluc  (DEXCOM G6 ) ANGUS, USE AS DIRECTED, Disp: 1 Device, Rfl: 0    Continuous Blood Gluc Sensor (DEXCOM G6 SENSOR) MISC, Change sensors every 10 days, Disp: 9 each, Rfl: 2    escitalopram (LEXAPRO) 5 MG tablet, Take 5 mg by mouth daily, Disp: , Rfl:     Cholecalciferol (VITAMIN D3) 50 MCG (2000 UT) CAPS, Take 2,000 Units by mouth daily, Disp: , Rfl:     pantoprazole (PROTONIX) 40 MG tablet, Take 40 mg by mouth daily, Disp: , Rfl: 1    nitroGLYCERIN (NITROSTAT) 0.4 MG SL tablet, Place 0.4 mg under the tongue as needed, Disp: , Rfl: 6    famotidine (PEPCID) 40 MG tablet, Take 40 mg by mouth daily, Disp: , Rfl:     levothyroxine (SYNTHROID) 50 MCG tablet, Take 75 mcg by mouth daily , Disp: , Rfl:     Fluticasone Propionate (FLONASE NA), 2 sprays by Nasal route daily as needed Each nostril , Disp: , Rfl:     aspirin EC 81 MG EC tablet, Take 81 mg by mouth daily. Indications: Cardiovascular Risk Reduction, Disp: , Rfl:     atorvastatin (LIPITOR) 20 MG tablet, Take 20 mg by mouth nightly. Indications: Increase in the Amount of Cholesterol in the Blood, Disp: , Rfl:     clopidogrel (PLAVIX) 75 MG tablet, Take 75 mg by mouth daily. Blood thinner , Disp: , Rfl:     DULoxetine (CYMBALTA) 60 MG capsule, Take 60 mg by mouth daily Indications: Depression In the morning , Disp: , Rfl:     choline fenofibrate (TRILIPIX) 135 MG CPDR, Take 135 mg by mouth daily. Indications: Increase in the Amount of Cholesterol in the Blood, Disp: , Rfl:     gabapentin (NEURONTIN) 300 MG capsule, Take 300 mg by mouth 2 times daily. Takes up to 3 times per day if needed  Indications: Nerve Disease, Disp: , Rfl:     isosorbide mononitrate (IMDUR) 30 MG CR tablet, Take 30 mg by mouth daily. Indications: Raised Blood Pressure, Disp: , Rfl:     insulin lispro (HUMALOG) 100 UNIT/ML injection vial, Indications: Diabetes Medtronic Revel 723. Basal rates  12am 0.55, 2am 0.075, 5:30am  0.45, 9am .90,  12pm 0.55, 430pm 0.65, 7pm 0.60 Carbohydrate ratio  12am 7.5,  11am 9.5, 4:30pm 8.0. Sensitivity  12am 28, 6pm 23. BS target. 12am , 100-120 Max daily dose 55 units  Dx: E10.8, Disp: 1 vial, Rfl: 3    Continuous Blood Gluc Transmit (DEXCOM G6 TRANSMITTER) MISC, As Directed (Patient not taking: Reported on 3/31/2021), Disp: 1 each, Rfl: 4    The patient is allergic to niacin and related and penicillins.     Past Medical History  Anya Davenport  has a past medical history of Hyperlipidemia, Hypertension, Hypothyroidism, and Type II or unspecified type diabetes mellitus without mention of complication, not stated as Skin: Negative for rash and wound. Neurological: Negative for numbness. Objective:     /60 (Site: Left Upper Arm, Position: Sitting, Cuff Size: Medium Adult)   Pulse 80   Temp 97.9 °F (36.6 °C)   Ht 4' 10\" (1.473 m)   Wt 115 lb 9.6 oz (52.4 kg)   BMI 24.16 kg/m²     Physical Exam  Vitals signs reviewed. Constitutional:       General: She is not in acute distress. Appearance: She is well-developed. She is not diaphoretic. HENT:      Head: Normocephalic and atraumatic. Neck:      Thyroid: No thyromegaly. Cardiovascular:      Rate and Rhythm: Normal rate and regular rhythm. Heart sounds: Normal heart sounds. No murmur. No friction rub. No gallop. Pulmonary:      Effort: Pulmonary effort is normal. No respiratory distress. Breath sounds: Normal breath sounds. No wheezing or rales. Abdominal:      General: There is no distension. Palpations: Abdomen is soft. Tenderness: There is no abdominal tenderness. Musculoskeletal:         General: No tenderness or deformity. Skin:     General: Skin is warm and dry. Coloration: Skin is not pale. Findings: No erythema. Neurological:      Mental Status: She is alert and oriented to person, place, and time. Cranial Nerves: No cranial nerve deficit. Labs Reviewed 3/31/2021:    Lab Results   Component Value Date    WBC 5.1 04/05/2021    HGB 11.9 (L) 04/05/2021    HCT 35.8 04/05/2021     (L) 04/05/2021    CHOL 99 04/05/2021    TRIG 86 04/05/2021    HDL 36 (L) 04/05/2021    LDLDIRECT 48 04/05/2021    ALT 17 04/05/2021    AST 34 04/05/2021     04/05/2021    K 3.9 04/05/2021     04/05/2021    CREATININE 1.62 (H) 04/05/2021    BUN 32 (H) 04/05/2021    CO2 30 04/05/2021    TSH 59.28 02/13/2017    GLUF 143 (H) 04/05/2021    LABA1C 6.6 03/31/2021       Assessment/Plan      1. Type 1 diabetes mellitus with complication (HCC)  On insulin pump.    Last A1C 6.6%  CGM and pump download reviewed. Advised to use a temp basal when not eating well,. And to intake something like glucerna to support her glucose and metabolism when oral intake as low. Adjusting pump as follows due to lows overnight, am, noon and elevations at end of day. Pump download when you see Astrid Kennedy in April. Fasting labs are due in July, to include cholesterol level and kidney function. Let me know if you need slips for this or have them from another provider.     - Continuous Blood Gluc  (DEXCOM G6 ) ANGUS; USE AS DIRECTED  Dispense: 1 Device; Refill: 0  - Continuous Blood Gluc Sensor (DEXCOM G6 SENSOR) MISC; Change sensors every 10 days  Dispense: 9 each; Refill: 2    Return in about 3 months (around 6/30/2021) for Diabetes. Patient given educational materials - see patient instructions. Discussed use, benefit, and side effects of prescribed medications. All patient questions answered. Pt voiced understanding.        Electronically signed JAKE Sylvester CNP on 3/31/21 at 10:44 AM EDT

## 2021-04-01 ENCOUNTER — TELEPHONE (OUTPATIENT)
Dept: INTERNAL MEDICINE CLINIC | Age: 71
End: 2021-04-01

## 2021-04-01 NOTE — TELEPHONE ENCOUNTER
Javid Domínguez does not want to shut her pump off until cath. She would be at risk for DKA with no insulin on board. Instructed to consider temp basal of 50% from bedtime until time of cath. Reviewed setting temp basal. Javid Domínguez voiced understanding via teach back.

## 2021-04-01 NOTE — TELEPHONE ENCOUNTER
Patient's  called in. He stated that Britany Esposito is having a heart cath on Monday and they were told by the cardiologist to stop her pump the night before. He is questioning if they should do this. Please call and advise.

## 2021-04-06 ENCOUNTER — TELEPHONE (OUTPATIENT)
Dept: INTERNAL MEDICINE CLINIC | Age: 71
End: 2021-04-06

## 2021-04-06 NOTE — TELEPHONE ENCOUNTER
Patient called in and left a message to cancel her appointment due to having a cath done yesterday and to let us know that she has not received her pump yet. I returned the call and left a message asking for a return call to reschedule. I also let her know that I spoke with the pump rep and he stated that she declined the pump twice due to financial issues. I stated that if she would like to proceed to let me know and I can contact the rep and let him know to open up the chart for her to get the pump.

## 2021-04-12 ASSESSMENT — ENCOUNTER SYMPTOMS
DIARRHEA: 0
NAUSEA: 0
VOMITING: 0
ABDOMINAL PAIN: 0
CONSTIPATION: 0
SHORTNESS OF BREATH: 0
COUGH: 0

## 2021-04-13 ENCOUNTER — OFFICE VISIT (OUTPATIENT)
Dept: INTERNAL MEDICINE CLINIC | Age: 71
End: 2021-04-13

## 2021-04-13 ENCOUNTER — TELEPHONE (OUTPATIENT)
Dept: INTERNAL MEDICINE CLINIC | Age: 71
End: 2021-04-13

## 2021-04-13 VITALS
HEART RATE: 77 BPM | DIASTOLIC BLOOD PRESSURE: 52 MMHG | TEMPERATURE: 97.7 F | WEIGHT: 113.6 LBS | SYSTOLIC BLOOD PRESSURE: 110 MMHG | HEIGHT: 58 IN | BODY MASS INDEX: 23.85 KG/M2

## 2021-04-13 DIAGNOSIS — E10.8 TYPE 1 DIABETES MELLITUS WITH COMPLICATION (HCC): ICD-10-CM

## 2021-04-13 PROCEDURE — G0108 DIAB MANAGE TRN  PER INDIV: HCPCS | Performed by: INTERNAL MEDICINE

## 2021-04-13 NOTE — PROGRESS NOTES
[facility]  [address] Shana Pinto, [address]  [phone] (phone)  [phone] (fax)    Patient ID: Jamil Nayak 1950  Referring Provider: Dr. Toby Steele     Patient's name and  were verified. Subjective:    She presents for Her follow-up diabetic visit. She has type 1 diabetes mellitus. Home regimen includes: insulin She is noncompliant some of the time. Assessment:     Lab Results   Component Value Date    LABA1C 6.6 2021    BUN 32 2021    CREATININE 1.62 2021    CREATININE 1.4 2013     There were no vitals filed for this visit. Wt Readings from Last 3 Encounters:   21 115 lb 9.6 oz (52.4 kg)   21 113 lb (51.3 kg)   20 109 lb 9.6 oz (49.7 kg)     Ht Readings from Last 3 Encounters:   21 4' 10\" (1.473 m)   21 4' 10\" (1.473 m)   20 4' 10\" (1.473 m)       Current monitoring regimen: home blood tests - 4 times daily  Home blood sugar trends: FBS's 103-136. Noon 201-310. Dinner . -177  Any episodes of hypoglycemia? No-not in the past 2-3 weeks  Previous visit with dietician: 2019; declines visit  Current diet: B 10a Belvita bar                   L often skips                   D 3-5pm chicken bowl 53gm / salad                   Some days-very hungru  Current exercise: ADL's; sleeping a lot  Eye exam current (within one year): yes Dr. Valerie Elam 2020  Any history of foot problems? no  Last foot exam: 20  Immunizations up to date: yes -   Taking ASA:  Yes   Appropriate for use of MyChart Glucose Grid:  No    Focus:     Diabetes education. Recent A1C 6.6%. Fasting readings near goal, bu elevate through the rest of the day. Rashid Lundberg remains very fatigued from having COVID. Discussed the need to try to increase strength/ energy--small exercise effort daily. Discussed insulin settings to address high BS during the day. Also discussed ways to rememer boluses at all meals. Follow up 3 months.     DSME PLAN:

## 2021-04-13 NOTE — PATIENT INSTRUCTIONS
Eat 3 \"meals\" each day         Breakfast by 10am         Lunch 1-2pm         Dinner 4-6pm     On \"hungry days\" --limit snacking to only every 2 hours (or less)                Make sure you bolus for all meals and snacks  Bolus for all food eaten:  Put a sticky note on your Belvita box, inside the cupboard where your bowls are and on the microwave face           \"remember to bolus\"  Always keep something to treat a low blood sugar-- skittles  Get your stretch band --lay it close to the arm of your chair             At least 5 minutes with your arms and 5 minutes with your legs before                Dozing off

## 2021-04-15 NOTE — TELEPHONE ENCOUNTER
Lisa Huff instructed on insulin pump setting changes and assisted in making changes in settings as directed. Basal rate at 9am 0.9 units --incr to 0.95 units  Carb ratio at 12am 7.5 grams  --incr 7 grams                       11am 9.5 grams --move to 1pm (so late                                              bkfst can be covered with 7gm)   Lisa Huff voiced understanding via teach back.

## 2021-04-22 DIAGNOSIS — E10.8 TYPE 1 DIABETES MELLITUS WITH COMPLICATION (HCC): ICD-10-CM

## 2021-04-22 RX ORDER — BLOOD-GLUCOSE SENSOR
EACH MISCELLANEOUS
Qty: 9 EACH | Refills: 2 | Status: SHIPPED | OUTPATIENT
Start: 2021-04-22 | End: 2022-03-16 | Stop reason: SDUPTHER

## 2021-07-09 ENCOUNTER — OFFICE VISIT (OUTPATIENT)
Dept: INTERNAL MEDICINE CLINIC | Age: 71
End: 2021-07-09

## 2021-07-09 VITALS — TEMPERATURE: 97.9 F | BODY MASS INDEX: 23.51 KG/M2 | WEIGHT: 112 LBS | HEIGHT: 58 IN

## 2021-07-09 DIAGNOSIS — E10.8 TYPE 1 DIABETES MELLITUS WITH COMPLICATION (HCC): Primary | ICD-10-CM

## 2021-07-09 NOTE — PROGRESS NOTES
Frankie Langford present, with  and daughter, for pump new instructions on Tandem insulin pump. Currently wearing Medtronic 723 insulin pump    Pump instructions completed per Tandem checklist and scanned in to EPIC chart. Instructed to check blood sugars 4+ times per day and to call blood sugars to [facility] as needed . Follow up appointment with Tara eHad RN  in 4 week(s) for follow up . Juan Wells states She is willing to participate in this plan of care and verbalized understanding of all instructions provided. Teach back used to verify comprehension. Total time involved in direct patient education: 110 minutes.    PUMP SETTINGS:            Basal rate: 0000 is 0.55 units/h, 2am 0.1 units, 5:30a 0.45 units, 9am 0.95 units, 12p 0.55 units, 4:30p 0.65 units, 7pm 0,6 units            Carbohydrate ratio: 0000 is 7 grams/unit, 11a 9.5 grams, 4:30pm 8 grams                         Insulin sensitivity: 0000 is 28 mg/dL per unit, 6pm 23 mg      Blood sugar goal: 110 mg      Active insulin: 4 hrs

## 2021-07-22 ENCOUNTER — TELEPHONE (OUTPATIENT)
Dept: INTERNAL MEDICINE CLINIC | Age: 71
End: 2021-07-22

## 2021-07-22 NOTE — TELEPHONE ENCOUNTER
Tandem insulin pump downloaded. With limited glucose information, it appears glucose levels are in reasonable control. No changes at this time.

## 2021-08-04 ENCOUNTER — TELEPHONE (OUTPATIENT)
Dept: INTERNAL MEDICINE CLINIC | Age: 71
End: 2021-08-04

## 2021-08-04 ENCOUNTER — OFFICE VISIT (OUTPATIENT)
Dept: INTERNAL MEDICINE CLINIC | Age: 71
End: 2021-08-04

## 2021-08-04 VITALS — DIASTOLIC BLOOD PRESSURE: 56 MMHG | SYSTOLIC BLOOD PRESSURE: 112 MMHG

## 2021-08-04 DIAGNOSIS — E10.8 TYPE 1 DIABETES MELLITUS WITH COMPLICATION (HCC): Primary | ICD-10-CM

## 2021-08-04 LAB — HBA1C MFR BLD: 6.3 % (ref 4.3–5.7)

## 2021-08-04 PROCEDURE — 83036 HEMOGLOBIN GLYCOSYLATED A1C: CPT | Performed by: INTERNAL MEDICINE

## 2021-08-04 PROCEDURE — G0108 DIAB MANAGE TRN  PER INDIV: HCPCS | Performed by: INTERNAL MEDICINE

## 2021-08-04 RX ORDER — CETIRIZINE HYDROCHLORIDE 10 MG/1
10 TABLET ORAL PRN
COMMUNITY
Start: 2021-05-04

## 2021-08-04 NOTE — PATIENT INSTRUCTIONS
Check your blood sugars waking up and before eating each meal       Even if you are not going to eat = still put your blood sugar into your                     Pump and enter 0 carbohydrates  You need to increase activity levels            --walk with your girlfriend or walk with Bill almost every day!! Every day you are at home --when you eat your meal--use your                       Stretch bands for at least 5 minutes after eating                          Arms and legs                    Build up your strength!!  Bolus before you eat --at least what you are sure you can eat.                 You can bolus after eating for the rest of the carbohydrates you                          actually ate  Skittles for low blood sugars under 75

## 2021-08-04 NOTE — TELEPHONE ENCOUNTER
I called Medicare/[employer] associate and asked where the process was for [facility]. She stated that they never received the paperwork that was faxed on 7/27/2021. She gave me an alternate fax number and I refaxed the paperwork.

## 2021-08-04 NOTE — PROGRESS NOTES
[facility]  [address] [place] Pinto, [address]  [phone] (phone)  [phone] (fax)    Patient ID: Vazquez Anderson 1950  Referring Provider: Dr. Vannessa Bergman     Patient's name and  were verified. Subjective:    She presents for Her follow-up diabetic visit. She has type 1 diabetes mellitus. Home regimen includes: Insulin She is noncompliant some of the time. Assessment:     Lab Results   Component Value Date    LABA1C 6.6 2021    BUN 32 2021    CREATININE 1.62 2021    CREATININE 1.4 2013     There were no vitals filed for this visit. Wt Readings from Last 3 Encounters:   21 112 lb (50.8 kg)   21 113 lb 9.6 oz (51.5 kg)   21 115 lb 9.6 oz (52.4 kg)     Ht Readings from Last 3 Encounters:   21 4' 10\" (1.473 m)   21 4' 10\" (1.473 m)   21 4' 10\" (1.473 m)       Glucose at 1.5 hrs PPD today resulted at 6.3%mg/dl  Current monitoring regimen: home blood tests - 2-4 times daily  Home blood sugar trends: FBS's . Dinner 110-169, 243 - 183  Any episodes of hypoglycemia? yes - less than once a weekl  Depression screening completed 3/31/2021  Previous visit with dietician: remote  Current diet: B Belvita bar 34 grams                      L mushroom swiss/ burger                       D salad                       Snacks--apple peanut butter or celery peanut butter. Rare cookie                       Water; unsw tea, 1 diet soda per day          Current exercise: ADL's -low activity levels  Eye exam current (within one year): yes appt 21  Any history of foot problems? no  Last foot exam: 20  Immunizations up to date: yes -   Taking ASA:  Yes   Appropriate for use of MyChart Glucose Grid:  No    Focus:     Diabetes education. A1C today 6.3%. Luisa Quintero denies lows more than once weekly, but in review of BS's entered in her pump, there are fairly low readings that occur during fasting hours (ranging 7am-1/2pm). Basal/ bolus is approx 69%/31%. Often high readings are r/t not bolusing before eating and forgetting after the meal. Energy levels remain low s/p COVID and activity is minimal. We need to work on building her back up again. Garett Hernandez is still waiting for approval for Dexcom CGM. Much encouragement given. Follow up 3 months. DSME PLAN:   Discussed general issues about diabetes pathophysiology and management. Counseling at today's visit: BG goals; carbs; bolus before meals; exercise; treating low BS's. Check your blood sugars waking up and before eating each meal       Even if you are not going to eat = still put your blood sugar into your                     Pump and enter 0 carbohydrates  You need to increase activity levels            --walk with your girlfriend or walk with Bill almost every day!! Every day you are at home --when you eat your meal--use your                       Stretch bands for at least 5 minutes after eating                          Arms and legs                    Build up your strength!!  Bolus before you eat --at least what you are sure you can eat. You can bolus after eating for the rest of the carbohydrates you                          actually ate  Skittles for low blood sugars under 75    Meter download, medications, PMH and nursing assessment reviewed. Kyle Listen states She is willing to participate in this plan of care and verbalized understanding of all instructions provided. Teach back used to verify comprehension. Total time involved in direct patient education: 60 minutes.

## 2021-08-04 NOTE — TELEPHONE ENCOUNTER
Diabetes education  A1C today 6.3%. Patricia Jovel denies lows more than once weekly, but in review of BS's entered in her pump, there are fairly low readings that occur during fasting hours (ranging 7am-1/2pm). FBS's [id number]. Dinner 110-169, [facility]  Patricia Jovel often does not feel her low blood sugars and has a history of severe lows with pass out  Basal/ bolus is approx 69%/31%. We are still waiting for approval of  Dexcom CGM    Dr. Marie Pro,  Please authorize the Diabetes Clinic at [address] [address] to teach/ assist Patricia Jovel to decrease her basal rate 5:30am from 0.45 units to 0.4 units and 9am 0.95 units to 0.9 units. If you agree, please note and return. Thank you.

## 2021-08-05 NOTE — TELEPHONE ENCOUNTER
Call to [employer]. Instructed on pump setting changes as directed. Decrease basal rate 5:30am from 0.45 units to 0.4 units and 9am 0.95 units to 0.9 units. Assisted Bill in making these changes in the pump while on the phone. Confirmed settings and understanding of these changes via teach back.

## 2021-08-16 NOTE — TELEPHONE ENCOUNTER
Received notification that Dexcom PA has been approved. LVM for Walter Mckeno with this update.     Viraj Boyer) Randene Najjar, PharmD, [facility]  Internal Medicine Clinical Pharmacist  [phone]

## 2021-10-06 ENCOUNTER — OFFICE VISIT (OUTPATIENT)
Dept: INTERNAL MEDICINE CLINIC | Age: 71
End: 2021-10-06

## 2021-10-06 VITALS
DIASTOLIC BLOOD PRESSURE: 72 MMHG | SYSTOLIC BLOOD PRESSURE: 128 MMHG | TEMPERATURE: 97.6 F | WEIGHT: 110 LBS | BODY MASS INDEX: 22.99 KG/M2 | HEART RATE: 74 BPM

## 2021-10-06 DIAGNOSIS — Z91.81 AT HIGH RISK FOR FALLS: ICD-10-CM

## 2021-10-06 DIAGNOSIS — E10.8 TYPE 1 DIABETES MELLITUS WITH COMPLICATION (HCC): Primary | ICD-10-CM

## 2021-10-06 DIAGNOSIS — E78.5 DYSLIPIDEMIA: ICD-10-CM

## 2021-10-06 DIAGNOSIS — E10.8 TYPE 1 DIABETES MELLITUS WITH COMPLICATION (HCC): ICD-10-CM

## 2021-10-06 PROCEDURE — 99213 OFFICE O/P EST LOW 20 MIN: CPT | Performed by: INTERNAL MEDICINE

## 2021-10-06 RX ORDER — BLOOD-GLUCOSE TRANSMITTER
EACH MISCELLANEOUS
Qty: 1 EACH | Refills: 4 | Status: SHIPPED | OUTPATIENT
Start: 2021-10-06 | End: 2022-03-16 | Stop reason: SDUPTHER

## 2021-10-06 NOTE — PROGRESS NOTES
[address] Internal Medicine  [address]A.STACY, [facility]  Dept: [phone]  Dept Fax: [phone]      Chief Complaint   Patient presents with    Diabetes     A1C 8/2021- Need M/C ratio        HPI:    Patient presents for follow-up of diabetes. Not seen the patient in the past she was seen by Arlen Stanford CNP previously. She has insulin pump, recent laboratory data from 4/2021 were reviewed including lipids and a BMP she does have CKD stage III. Last A1c is 6.3, we downloaded   Her sugars, average sugars 161, highest was 300, lowest was 35 and she was symptomatic, spouse called EMS   Was given glucose gel, and she felt better. No CP or SOB,   Pt is [place], but does not wear a hearing aide. She was diagnosed with DM-2 at age 24, for nearly 48 yrs. She does follow up with a nephrologist at [address] [place] [address] [facility] doc.        Past Medical History:   Diagnosis Date    Hyperlipidemia     Hypertension     Hypothyroidism     Type II or unspecified type diabetes mellitus without mention of complication, not stated as uncontrolled     Diagnosed 1974       Family History   Problem Relation Age of Onset    Emphysema Mother     Other Father         pancreatitis    Diabetes Sister     Hypertension Sister     High Cholesterol Sister     Heart Disease Brother     Kidney Disease Brother     Diabetes Brother     Other Brother         MVA       Social History     Socioeconomic History    Marital status:      Spouse name: Not on file    Number of children: Not on file    Years of education: Not on file    Highest education level: Not on file   Occupational History    Occupation: On Disability   Tobacco Use    Smoking status: Never Smoker    Smokeless tobacco: Never Used   Substance and Sexual Activity    Alcohol use: Not Currently     Comment: 1 straw berry daquari per year    Drug use: Not on file    Sexual activity: Not on file   Other Topics Concern    Not on file   Social History Narrative    Not on file     Social Determinants of Health     Financial Resource Strain: Medium Risk    Difficulty of Paying Living Expenses: Somewhat hard   Food Insecurity: Food Insecurity Present    Worried About Running Out of Food in the Last Year: Sometimes true    Thi of Food in the Last Year: Sometimes true   Transportation Needs: No Transportation Needs    Lack of Transportation (Medical): No    Lack of Transportation (Non-Medical): No   Physical Activity:     Days of Exercise per Week:     Minutes of Exercise per Session:    Stress:     Feeling of Stress :    Social Connections:     Frequency of Communication with Friends and Family:     Frequency of Social Gatherings with Friends and Family:     Attends Yazidi Services:     Active Member of Clubs or Organizations:     Attends Club or Organization Meetings:     Marital Status:    Intimate Partner Violence:     Fear of Current or Ex-Partner:     Emotionally Abused:     Physically Abused:     Sexually Abused:        Review of Systems     Duration of diabetes: for nearly 48 yrs   Duration of insulin therapy: since 1988  Blood Sugar readings: High 300  Low 35  Dietary Consult: yes  Opthalmology Consult: yes two months ago  Urine Protein: will need to checked  Last HbA1c: 6.3    Pertinent Physical Exam:    Pt alert and oriented x 3 , OLINDA, Neck no JVD, cardiac reg HS, Lungs CTA, no wheezing, abd soft NT, BS,   Ext no calf pain, with good pulses. /72 (Site: Left Upper Arm)   Pulse 74   Temp 97.6 °F (36.4 °C)   Wt 110 lb (49.9 kg)   BMI 22.99 kg/m²          Diagnosis Orders   1. Type 1 diabetes mellitus with complication (HCC)  Lipid Panel    Basic Metabolic Panel    Microalbumin / Creatinine Urine Ratio    Hepatic Function Panel   2. Dyslipidemia  Lipid Panel   3. At high risk for falls           Plan:    Pt does have a CGM, dexcom . , tandem . Had hypoglycemic  Symptoms, per spouse this is rare.  Continue with current insulin pump settings, and has an appt with Grecia Ramachandran our CDE  Next month. .we plan on seeing her back in about 4 months  And encouraged fasting labs pre next visit. As outlined above  She does have CKD, followed by nephrologist BETZY. Dyslipidemia  Recommend full lipid panel and labs pre next visit. On lipitor 20 mg daily , and ADA diet. Orders Placed This Encounter   Procedures    Lipid Panel     Check 7-10 days pre office visit     Standing Status:   Future     Standing Expiration Date:   10/6/2022     Order Specific Question:   Is Patient Fasting?/# of Hours     Answer:   yes     Comments:   12 hours    Basic Metabolic Panel     Check 3-73 days pre office visit     Standing Status:   Future     Standing Expiration Date:   10/6/2022    Microalbumin / Creatinine Urine Ratio     Check 7-10 days pre office visit     Standing Status:   Future     Standing Expiration Date:   10/6/2022    Hepatic Function Panel     Check 7-10 days pre office visit     Standing Status:   Future     Standing Expiration Date:   10/6/2022       Medication Changes:    None at this time. Tandem insulin pump., and this is being followed by Adilene Escobar. Currently wearing Medtronic 723 insulin pump                              On the basis of positive falls risk screening, assessment and plan is as follows: home safety tips provided.

## 2021-10-06 NOTE — TELEPHONE ENCOUNTER
Patient's  stopped me in the hallway and stated that [place] needed a DexCom transmitter script sent to the pharmacy. Please sign script.  Thank you

## 2021-10-12 ENCOUNTER — TELEPHONE (OUTPATIENT)
Dept: INTERNAL MEDICINE CLINIC | Age: 71
End: 2021-10-12

## 2021-10-12 DIAGNOSIS — E10.8 TYPE 1 DIABETES MELLITUS WITH COMPLICATION (HCC): Primary | ICD-10-CM

## 2021-10-12 NOTE — TELEPHONE ENCOUNTER
[facility] (supplier for insulin pump? Requesting records and labs (C-peptide and FBG) we do not have a recent result for either of these labs to send. Per VO Dr. Johnson Fake ok to go ahead and order C-Peptide and FBG. Attepted to reach patient to inform. TCB.

## 2021-10-15 LAB
CHOLESTEROL, TOTAL: 119 MG/DL
CHOLESTEROL/HDL RATIO: NORMAL
HDLC SERPL-MCNC: 36 MG/DL (ref 35–70)
LDL CHOLESTEROL CALCULATED: 64 MG/DL (ref 0–160)
NONHDLC SERPL-MCNC: 83 MG/DL
TRIGL SERPL-MCNC: 98 MG/DL
VLDLC SERPL CALC-MCNC: NORMAL MG/DL

## 2021-10-19 NOTE — TELEPHONE ENCOUNTER
Attempted to reach patient a second time. Mailbox is now full and I cannot leave a voicemail at this time. I will send labs to patients home.

## 2021-12-16 ENCOUNTER — TELEPHONE (OUTPATIENT)
Dept: INTERNAL MEDICINE CLINIC | Age: 71
End: 2021-12-16

## 2021-12-16 NOTE — TELEPHONE ENCOUNTER
Patient had canceled appointment with Noel Messer Called patient and left message asking for a return call to reschedule.

## 2021-12-22 ENCOUNTER — OFFICE VISIT (OUTPATIENT)
Dept: INTERNAL MEDICINE CLINIC | Age: 71
End: 2021-12-22

## 2021-12-22 DIAGNOSIS — E10.8 TYPE 1 DIABETES MELLITUS WITH COMPLICATION (HCC): Primary | ICD-10-CM

## 2021-12-22 LAB — HBA1C MFR BLD: 7 % (ref 4.3–5.7)

## 2021-12-22 PROCEDURE — 83036 HEMOGLOBIN GLYCOSYLATED A1C: CPT | Performed by: INTERNAL MEDICINE

## 2021-12-22 PROCEDURE — 97803 MED NUTRITION INDIV SUBSEQ: CPT | Performed by: INTERNAL MEDICINE

## 2021-12-22 NOTE — PATIENT INSTRUCTIONS
1. Every time you eat food or snack, you should be entering carbs into your pump   -Refer to book for carb counting    2. Look for Tegaderm covers and/or Skin Tac Adhesive Wipes to prepare the pump site    3. When you get home, try to connect your Dexcom to your Tandem pump   -Then you can turn on your Control-IQ!     Pharmacist Phone Number: [phone]

## 2021-12-22 NOTE — PROGRESS NOTES
[facility]  [address] [address] [address], [facility] [place], [address]  [phone] (phone)  [phone] (fax)    Patient ID: Bulmaro Hernandez 1950  Referring Provider: Dr. Joe Esposito     Patient's name and  were verified. Subjective:    She presents for Her follow-up diabetic visit. She has type 1 diabetes mellitus. Home regimen includes: Insulin She is compliant most of the time. Assessment:     Lab Results   Component Value Date    LABA1C 7.0 2021    LABA1C 6.6 2021    BUN 19 10/15/2021    CREATININE 1.5 10/15/2021    CREATININE 1.4 2013     Vitals:    21 1055   BP: (!) 108/53   Pulse: 54   Temp: 97.3 °F (36.3 °C)   Weight: 109 lb (49.4 kg)     Wt Readings from Last 3 Encounters:   21 109 lb (49.4 kg)   10/06/21 110 lb (49.9 kg)   21 112 lb (50.8 kg)     Ht Readings from Last 3 Encounters:   21 4' 10\" (1.473 m)   21 4' 10\" (1.473 m)   21 4' 10\" (1.473 m)       Diabetes Pharmacotherapy:  Tandem pump w/ Humalog    Glucose Trends:   Glucose at 2 hrs PPD today resulted at 280 mg/dl  Current monitoring regimen: has Dexcom CGM, but lost reader; has been checking BG ~1-2x daily w/ fingersticks while reader is lost  Home blood sugar trends:    -Fasting AM: 192, 183   -Before lunch: 114, 136, 169   -Before dinner: 66, 189, 164   -Bedtime: 345, 213, 223  Any episodes of hypoglycemia?  Yes, less than once per week (usually during daytime naps)   -Treats with granola bars    Lifestyle Factors:   Previous visit with dietician: remote  Current diet: B: Belvita breakfast bar- 35g carbs            L:  Sometimes skips or eats light meal                       D: 5-6p: chicken bowls - 45g carbs; when out to eat: grilled chicken, salad w/ ranch, carrots/cucumber                       Snacks: carrots, PB & celery, salad                       Beverages: water, mostly  Current exercise: did not review in depth    Health Maintenance:  Eye exam current (within one year): yes 8/2021; Dr. Rimma Barahona  Any history of foot problems? no  Last foot exam: today 12/22/21    Skin: without breakdown   Ulcers/calluses: none   Nails: normal   Pedal pulses:    Dorsalis pedis, left: present    Dorsalis pedis, right: present    Posterior tibial, left: present    Posterior tibial, right: present  Results of monofilament test: 10/10 bilaterally    Immunizations up to date: No - due for flu vaccine  Taking ASA:  Yes   Taking statin: atorvastatin   Last urine microalbumin:   Lab Results   Component Value Date    LABMICR 1.71 10/15/2021    Taking ACE/ARB: No     Focus:   Diabetes Education. A1C increased slightly to 7.0% today, however, remains at goal. Marily Reyes recently started using her Dexcom and was liking it; however, the reader is currently lost and unavailable for download. Discussed that the [facility] sensor & transmitter can be connected with Ina's pump (reader will not be needed). Once the Dexcom is connected, Control-IQ may be enabled, which would allow for auto-corrections and basal adjustments. Talked Marily Reyes and her  Ros Noriega through the steps to connect these 2 devices. Encouraged Marily Reyes to be sure to bolus for all carbs eaten (including snacks); missed boluses have led to some higher sugars. Ney and Marily Amy expressed some difficulty with carb estimations - encouraged follow-up with dietician for carb counting refresher; however, they deferred at this time. Provided carb counting book for now. Marily Centenohouse reports some difficulty with adhesion at pump sites - provided recommendations for skin preps and overpatches. DSME PLAN:   Discussed general issues about diabetes pathophysiology and management. Counseling at today's visit: Control-IQ technology, carbohydrate counting. 1. Every time you eat food or snack, you should be entering carbs into your pump   -Refer to book for carb counting    2. Look for Tegaderm covers and/or Skin Tac Adhesive Wipes to prepare the pump site    3.  When you get home, try to connect your Dexcom to your Tandem pump   -Then you can turn on your Control-IQ! 4. Pump setting adjustment (pending approval of Dr. Uriel Milton):   -Carb ratio 12p: 1:9.5g -> 10g (weakened)    Meter download, medications, PMH and nursing assessment reviewed. Joy Ferris states She is willing to participate in this plan of care and verbalized understanding of all instructions provided. Teach back used to verify comprehension. Follow-up: 1 month w/ Dr. Dale Arellano, 3 month w/ CARLOTA Clinc RN    Total time involved in direct patient education: 60 minutes.      For Pharmacy Admin Tracking Only   CPA in place:  No   Intervention Detail: Adherence Monitorin and Device Training   Total # of Interventions Recommended: 2   Total # of Interventions Accepted: 2   Time Spent (min): Nolberto (Bessy Carlos) Tyler Castro, PharmD, [facility]  Internal Medicine Clinical Pharmacist  [phone]

## 2021-12-27 ENCOUNTER — TELEPHONE (OUTPATIENT)
Dept: INTERNAL MEDICINE CLINIC | Age: 71
End: 2021-12-27

## 2021-12-27 VITALS
WEIGHT: 109 LBS | BODY MASS INDEX: 22.78 KG/M2 | HEART RATE: 54 BPM | SYSTOLIC BLOOD PRESSURE: 108 MMHG | DIASTOLIC BLOOD PRESSURE: 53 MMHG | TEMPERATURE: 97.3 F

## 2021-12-27 NOTE — TELEPHONE ENCOUNTER
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 Diabetes Pharmacotherapy:  Tandem pump w/ Humalog    Glucose Trends:   Glucose at 2 hrs PPD today resulted at 280 mg/dl  Current monitoring regimen: has Dexcom CGM, but lost reader; has been checking BG ~1-2x daily w/ fingersticks while reader is lost  Home blood sugar trends:    -Fasting AM: 192, 183, 270, 315, 289   -Before lunch: 114, 136, 169, 183   -Before dinner: 66, 189, 164, 224, 206, 241, 114   -Bedtime: 345, 213, 223, 199, 249, 165, 75, 320  Any episodes of hypoglycemia? Yes, less than once per week (usually during daytime naps)- not always recorded in pump   -Treats with benji mcmanus      Recommendations:  -Consider the following pump setting adjustment:   -Carb ratio 12p: 1:9.5g -> 10g (weakened)   -This should reduce afternoon/pre-dinner hypoglycemia  -Also encouraged [facility] to turn on [employer] (hybrid closed-loop system)    Please message back if you agree with the above pump setting adjustments.         Martin Byers) Irving Sam, PharmD, [facility]  Internal Medicine Clinical Pharmacist  [phone]

## 2022-01-31 ENCOUNTER — TELEPHONE (OUTPATIENT)
Dept: INTERNAL MEDICINE CLINIC | Age: 72
End: 2022-01-31

## 2022-01-31 NOTE — TELEPHONE ENCOUNTER
Patient's , Swapnil Rodarte called and stated that Jie Dick was in [facility] this weekend due to having a low blood sugar and was not able to get it back up. He believes that there may have been a malfunction with the pump and asked if we could look at it. I let him know that he would need to call [employer] and ask if they would be able to see what may have happened. He also stated that the new reservoirs that they received are very hard to get insulin in and to get the reservoir back out. I also told him to discuss this with [employer]. I told him to call [phone]. I told him that if [employer] could not help him, to call the office and we can download the meter to look at. He verbalized understanding.

## 2022-03-16 ENCOUNTER — OFFICE VISIT (OUTPATIENT)
Dept: INTERNAL MEDICINE CLINIC | Age: 72
End: 2022-03-16

## 2022-03-16 VITALS
SYSTOLIC BLOOD PRESSURE: 110 MMHG | DIASTOLIC BLOOD PRESSURE: 60 MMHG | HEART RATE: 80 BPM | TEMPERATURE: 97.8 F | WEIGHT: 109.5 LBS | BODY MASS INDEX: 22.89 KG/M2

## 2022-03-16 DIAGNOSIS — E10.8 TYPE 1 DIABETES MELLITUS WITH COMPLICATION (HCC): ICD-10-CM

## 2022-03-16 PROCEDURE — 99213 OFFICE O/P EST LOW 20 MIN: CPT | Performed by: INTERNAL MEDICINE

## 2022-03-16 RX ORDER — CARVEDILOL 3.12 MG/1
3.12 TABLET ORAL EVERY 12 HOURS
COMMUNITY
Start: 2021-11-13

## 2022-03-16 RX ORDER — INSULIN GLARGINE 100 [IU]/ML
10 INJECTION, SOLUTION SUBCUTANEOUS 2 TIMES DAILY
COMMUNITY
Start: 2022-01-30 | End: 2022-03-30

## 2022-03-16 RX ORDER — BLOOD-GLUCOSE SENSOR
EACH MISCELLANEOUS
Qty: 9 EACH | Refills: 2 | Status: SHIPPED | OUTPATIENT
Start: 2022-03-16 | End: 2022-07-07 | Stop reason: SDUPTHER

## 2022-03-16 RX ORDER — PEN NEEDLE, DIABETIC 31 GX5/16"
NEEDLE, DISPOSABLE MISCELLANEOUS
COMMUNITY
Start: 2022-01-30

## 2022-03-16 RX ORDER — B-COMPLEX WITH VITAMIN C
TABLET ORAL
COMMUNITY

## 2022-03-16 RX ORDER — AMLODIPINE BESYLATE 2.5 MG/1
TABLET ORAL
COMMUNITY
Start: 2022-03-03

## 2022-03-16 RX ORDER — BLOOD-GLUCOSE TRANSMITTER
EACH MISCELLANEOUS
Qty: 1 EACH | Refills: 4 | Status: SHIPPED | OUTPATIENT
Start: 2022-03-16

## 2022-03-16 NOTE — PROGRESS NOTES
[address] Internal Medicine  [address] ROSARIO AM OFFKARLEY GARCÍA.STACY, [facility]  Dept: [phone]  Dept Fax: [phone]      Chief Complaint   Patient presents with    Diabetes     A1C 12/22/21    Hyperlipidemia        HPI:    Patient presents for follow-up of diabetes. .  She has insulin pump, recent laboratory data from 4/2021 were reviewed including lipids and a BMP she does have CKD stage III. DUE TO LOW SUGARS SHE WAS IN THE HOSPITAL, FOR FEW DAYS AT [facility]. Sugars were down ~ 30 COUPLE OF MONTHS AGO. No further problems, last A1c was 7.0 on 12/22/22 , and can be repeated in about a week. Here with spouse today, he claims she is little under the weather. , no fever or chills, or LOC  No diarrhea or active GI symptoms. She was advised to drink plenty of fluids. , denies any SOB . Completed all 3 doses of covid vaccine. We downloaded her sugars , average is 140 , and 75% in the range,   <1 % VERY LOW.      Past Medical History:   Diagnosis Date    Hyperlipidemia     Hypertension     Hypothyroidism     Type II or unspecified type diabetes mellitus without mention of complication, not stated as uncontrolled     Diagnosed 1974       Family History   Problem Relation Age of Onset    Emphysema Mother     Other Father         pancreatitis    Diabetes Sister     Hypertension Sister     High Cholesterol Sister     Heart Disease Brother     Kidney Disease Brother     Diabetes Brother     Other Brother         MVA       Social History     Socioeconomic History    Marital status:      Spouse name: Not on file    Number of children: Not on file    Years of education: Not on file    Highest education level: Not on file   Occupational History    Occupation: On Disability   Tobacco Use    Smoking status: Never Smoker    Smokeless tobacco: Never Used   Substance and Sexual Activity    Alcohol use: Not Currently     Comment: 1 straw berry daquari per year    Drug use: Not on file  Sexual activity: Not on file   Other Topics Concern    Not on file   Social History Narrative    Not on file     Social Determinants of Health     Financial Resource Strain: Medium Risk    Difficulty of Paying Living Expenses: Somewhat hard   Food Insecurity: Food Insecurity Present    Worried About Running Out of Food in the Last Year: Sometimes true    Hti of Food in the Last Year: Sometimes true   Transportation Needs: No Transportation Needs    Lack of Transportation (Medical): No    Lack of Transportation (Non-Medical): No   Physical Activity:     Days of Exercise per Week: Not on file    Minutes of Exercise per Session: Not on file   Stress:     Feeling of Stress : Not on file   Social Connections:     Frequency of Communication with Friends and Family: Not on file    Frequency of Social Gatherings with Friends and Family: Not on file    Attends Taoism Services: Not on file    Active Member of [address] [place] [address] [employer] or Organizations: Not on file    Attends Club or Organization Meetings: Not on file    Marital Status: Not on file   Intimate Partner Violence:     Fear of Current or Ex-Partner: Not on file    Emotionally Abused: Not on file    Physically Abused: Not on file    Sexually Abused: Not on file   Housing Stability:     Unable to Pay for Housing in the Last Year: Not on file    Number of [place] in the Last Year: Not on file    Unstable Housing in the Last Year: Not on file       Review of Systems     Duration of diabetes: for nearly 50 yrs   Duration of insulin therapy: since 1988  Blood Sugar readings: High 300  Low 35  Dietary Consult: yes  Opthalmology Consult: yes two months ago  Urine Protein: will need to checked  Last HbA1c: 6.3    Pertinent Physical Exam:    Pt alert and oriented x 3 , OLINDA, Neck no JVD, cardiac reg HS, Lungs CTA, no wheezing, abd soft NT, BS,   Ext no calf pain, with good pulses.      /60 (Site: Right Upper Arm)   Pulse 80   Temp 97.8 °F (36.6 °C) Wt 109 lb 8 oz (49.7 kg)   BMI 22.89 kg/m²          Diagnosis Orders   1. Type 1 diabetes mellitus with complication (HCC)  Continuous Blood Gluc Sensor (DEXCOM G6 SENSOR) MISC    Continuous Blood Gluc Transmit (DEXCOM G6 TRANSMITTER) MISC         Plan:    Pt does have a CGM, dexcom . , tandem . Had hypoglycemic  Symptoms, per spouse this is rare. Continue with current insulin pump settings, and has an appt with Vivian Johnson our CDE in about a week. .we plan on seeing her back in about 4 months  And encouraged fasting labs pre next visit. As outlined above  She does have CKD, followed by nephrologist [facility]. Dyslipidemia  Recommend full lipid panel and labs pre next visit. On lipitor 20 mg daily , and ADA diet. No orders of the defined types were placed in this encounter. Medication Changes:    None at this time. , on the tandem pump. Reminded them the upcoming appt with Vivian Johnson , our CDE next week. On the basis of positive falls risk screening, assessment and plan is as follows: home safety tips provided.       Electronically signed by Miguelina Rosas MD on 3/16/2022 at 3:25 PM

## 2022-03-22 ENCOUNTER — OFFICE VISIT (OUTPATIENT)
Dept: INTERNAL MEDICINE CLINIC | Age: 72
End: 2022-03-22

## 2022-03-22 VITALS
SYSTOLIC BLOOD PRESSURE: 106 MMHG | DIASTOLIC BLOOD PRESSURE: 54 MMHG | WEIGHT: 109 LBS | TEMPERATURE: 97.2 F | HEIGHT: 58 IN | HEART RATE: 84 BPM | BODY MASS INDEX: 22.88 KG/M2

## 2022-03-22 DIAGNOSIS — E10.8 TYPE 1 DIABETES MELLITUS WITH COMPLICATION (HCC): ICD-10-CM

## 2022-03-22 ASSESSMENT — PATIENT HEALTH QUESTIONNAIRE - PHQ9
SUM OF ALL RESPONSES TO PHQ QUESTIONS 1-9: 15
SUM OF ALL RESPONSES TO PHQ QUESTIONS 1-9: 15
4. FEELING TIRED OR HAVING LITTLE ENERGY: 3
10. IF YOU CHECKED OFF ANY PROBLEMS, HOW DIFFICULT HAVE THESE PROBLEMS MADE IT FOR YOU TO DO YOUR WORK, TAKE CARE OF THINGS AT HOME, OR GET ALONG WITH OTHER PEOPLE: 2
SUM OF ALL RESPONSES TO PHQ QUESTIONS 1-9: 15
SUM OF ALL RESPONSES TO PHQ QUESTIONS 1-9: 15
2. FEELING DOWN, DEPRESSED OR HOPELESS: 3
7. TROUBLE CONCENTRATING ON THINGS, SUCH AS READING THE NEWSPAPER OR WATCHING TELEVISION: 3
8. MOVING OR SPEAKING SO SLOWLY THAT OTHER PEOPLE COULD HAVE NOTICED. OR THE OPPOSITE, BEING SO FIGETY OR RESTLESS THAT YOU HAVE BEEN MOVING AROUND A LOT MORE THAN USUAL: 0
1. LITTLE INTEREST OR PLEASURE IN DOING THINGS: 2
SUM OF ALL RESPONSES TO PHQ9 QUESTIONS 1 & 2: 5
6. FEELING BAD ABOUT YOURSELF - OR THAT YOU ARE A FAILURE OR HAVE LET YOURSELF OR YOUR FAMILY DOWN: 2
9. THOUGHTS THAT YOU WOULD BE BETTER OFF DEAD, OR OF HURTING YOURSELF: 0
3. TROUBLE FALLING OR STAYING ASLEEP: 1
5. POOR APPETITE OR OVEREATING: 1

## 2022-03-22 NOTE — PROGRESS NOTES
[facility]  [address], Shana Pinto, [address]  [phone] (phone)  [phone] (fax)    Patient ID: Flavia Morillo 1950  Referring Provider: Dr. Gini Cochran     Patient's name and  were verified. Subjective:    She presents for Her follow-up diabetic visit. She has type 1 diabetes mellitus. Home regimen includes: Insulin She is noncompliant some of the time. Assessment:     Lab Results   Component Value Date    LABA1C 7.0 2021    LABA1C 6.6 2021    BUN 23 2022    CREATININE 1.5 2022    CREATININE 1.4 2013     There were no vitals filed for this visit. Wt Readings from Last 3 Encounters:   22 109 lb 8 oz (49.7 kg)   21 109 lb (49.4 kg)   10/06/21 110 lb (49.9 kg)     Ht Readings from Last 3 Encounters:   21 4' 10\" (1.473 m)   21 4' 10\" (1.473 m)   21 4' 10\" (1.473 m)       Current monitoring regimen: home blood tests - 4 times daily  Home blood sugar trends: per Dexcom CGM. TIR 47%         Rafaela Bourne has run out of CGM sensors d/t shortened duration sensors. Con Piles will call Dexcom regarding sensor issue for a replacement. Last CGM information was a week ago when Rafaela Bourne was sick and in ER. At that time she was not eating; so there were minimal boluses. Any episodes of hypoglycemia? No; not in the past 2-4                    weeks. Depression screening completed 3/22/22 Score 15  Previous visit with dietician: remote  Current diet: B 10am Belvita 1 pack                       L skips                       D [employer] roast beef/ cheddar and 1/2 Urdu conti                       6 pack sour cream onion crackers or apple or                       Carb smart ice cream bar, celery peanut butter                       SF Singh PB cups                       BT 12-1pm                       Drinks 1 diet pepsi pe rday  Current exercise: ADL's-low activity levels.  Not feeling well  Eye exam current (within one year): yes Dr. Franco Valdez 2021  Any history of foot problems? no  Last foot exam: 3/22/22 Pedal pulses:   peripheral pulses symmetrical   Results of monofilament test: 10/10              Skin noted to be warm, pale and hair is absent. Immunizations up to date: yes - flu and COVID  Taking ASA:  Yes   Appropriate for use of MyChart Glucose Grid:  No    Focus:     Diabetes education. A1C from 3/3/22 7.3%. Charmayne Burrows has not been feeling well with recurrent low abdominal pain. She has been admitted and evaluated for this. She is also struggling with her depression with minimal energy-especially to make any lifestyle changes. Appetite is poor and when not feeling well she may not eat all day. She likely has an infusion site infection in her right lower abd--red/warm/tender. Lorenzo July reports suspected issues with the tandem pump. It may be some use issues. Reviewed guidelines. Much encouragement given. She follows with her providers tomorrow for both the infection and her depression. Charmayne Burrows will follow up with the clinic in 1 week. DSME PLAN:   Discussed general issues about diabetes pathophysiology and management. Counseling at today's visit: BG goals; reporting depression; exercise; infusion site rotations; pump issues; treating high and low BS's. Follow up with your doctor about the infection on your stomach               And your depression  Try to charge the insulin pump every night  Call Dexcom and let them know the last sensor only lasted about 4-5           Days and you need a replacement --you haven't had 1 for 1-2 weeks  Bolus for every snack you eat. When you feel sick you have to nibble on                  Snacks even when you are sick. Bolus for these snacks  Meter download, medications, PMH and nursing assessment reviewed with CRESENCIO Roque states She is willing to participate in this plan of care and verbalized understanding of all instructions provided. Teach back used to verify comprehension.       Total time involved in direct patient education: 60 minutes.

## 2022-03-30 ENCOUNTER — PHARMACY VISIT (OUTPATIENT)
Dept: INTERNAL MEDICINE CLINIC | Age: 72
End: 2022-03-30

## 2022-03-30 DIAGNOSIS — E10.8 TYPE 1 DIABETES MELLITUS WITH COMPLICATION (HCC): ICD-10-CM

## 2022-03-30 PROCEDURE — G0108 DIAB MANAGE TRN  PER INDIV: HCPCS | Performed by: INTERNAL MEDICINE

## 2022-03-30 RX ORDER — GLUCAGON 3 MG/1
POWDER NASAL
Qty: 1 EACH | Refills: 1 | Status: SHIPPED | OUTPATIENT
Start: 2022-03-30

## 2022-03-30 NOTE — PROGRESS NOTES
[facility]  [address] [address] [address], [facility] [address]  [phone] (phone)  [phone] (fax)    Patient ID: Coral Levin 1950  Referring Provider: Dr. Michael Craven; also sees Dr. Marquis Bailey     Patient's name and  were verified. Subjective:    She presents for Her follow-up diabetic visit. She has type 1 diabetes mellitus. Home regimen includes: Insulin She is compliant most of the time. Assessment:     Lab Results   Component Value Date    LABA1C 7.0 2021    LABA1C 6.6 2021    BUN 23 2022    CREATININE 1.5 2022    CREATININE 1.4 2013     Vitals:    22 1648   BP: (!) 184/72   Pulse: 76   Temp: 97.5 °F (36.4 °C)   Weight: 111 lb 3.2 oz (50.4 kg)     Wt Readings from Last 3 Encounters:   22 111 lb 3.2 oz (50.4 kg)   22 109 lb (49.4 kg)   22 109 lb 8 oz (49.7 kg)     Ht Readings from Last 3 Encounters:   22 4' 10\" (1.473 m)   21 4' 10\" (1.473 m)   21 4' 10\" (1.473 m)       Diabetes Pharmacotherapy:  Tandem pump w/ Humalog   -Control-IQ not enabled    Glucose Trends:   Current monitoring regimen: Dexcom CGM  Home blood sugar trends: High: 60%, Target: 39%, Low: 1%   -Poor meal clearance   -Several episodes of lows caused by aggressive correction doses  Any episodes of hypoglycemia?  Yes - 2-3x per week   -Episode of 30 ~1 month ago; squad was called     Lifestyle Factors:   Previous visit with dietician: remote  Current diet: did not review in depth  Current exercise: did not review in depth    Health Maintenance:  Eye exam current (within one year): yes - Dr. Evangelina Mcpherson 2021  Any history of foot problems? no  Last foot exam: 3/2022  Immunizations up to date: Yes  The 10-year ASCVD risk score (Brenton Gomez., et al., 2013) is: 17.4%  Last panel - LDL:   Lab Results   Component Value Date    LDLCALC 64 10/15/2021   Taking ASA:  Yes  Taking statin: atorvastatin   Last microalbumin/creatinine ratio: 22 (10/2021)  Taking ACE/ARB: No Focus:   Diabetes Education. Last A1C was 7.3% (3/3/22). Time in range is below goal at 39%. The Tandem pump is working decently well for Seda Elliott; however, she is not bolusing for her carbohydrates and struggles to clear her meals. Today, we significantly weakened Ina's carb ratios and encouraged her to bolus for both her blood sugars and her carbs. Both Seda Elliott and her  struggle to navigate the touchscreen pump; however, reviewed the steps extensively and provided a stylus to improve accuracy. Also assisted Seda Elliott with enabling Control-IQ on her pump. Of note, Seda Elliott has been experiencing periodic low blood sugars, sometimes related to aggressive correction boluses. Weakened correction bolus settings. Reviewed treatment of lows, including use of Baqsimi in emergency situations. Finally, Seda Elliott and her  express interest in pill packs due to memory issues and medication compliance. DSME PLAN:   Discussed general issues about diabetes pathophysiology and management. Counseling at today's visit: discussed management of hypoglycemic episodes and medication adherence. Discussed ways to avoid symptomatic hypoglycemia. 1. Try to put in both your carbs and blood sugar when you bolus    2. Pump Setting Adjustment:   -Control-IQ is now enabled - this will provide automatic adjustments   -Correction factor: 12a-4:30p: 28 -> 32                                             4:40p-12a: 23 -> 26   -Carb ratio: 12a-12a: weakened all to 12g    3.  the Baqsimi from the pharmacy   -Keep on hand for treatment of low blood sugars    4. Pharmacist will coordinate pill packs & give you a call      Meter download, medications, PMH and nursing assessment reviewed. Hilario Tovar states She is willing to participate in this plan of care and verbalized understanding of all instructions provided. Teach back used to verify comprehension.       Follow-up: 1 month PharmD     Total time involved in direct patient education: 60 minutes. For Terry Kraft in place:   Yes   Recommendation Provided To: Patient/Caregiver: 4 via In person   Intervention Detail: Adherence Monitorin, Dose Adjustment: 2, reason: Therapy Optimization and New Rx: 1, reason: Needs Additional Therapy   Gap Closed?: No    Intervention Accepted By: Patient/Caregiver: 4   Time Spent (min): [address], PharmD, [facility]  Internal Medicine Clinical Pharmacist  [phone]

## 2022-03-30 NOTE — PATIENT INSTRUCTIONS
1. Try to put in both your carbs and blood sugar when you bolus    2.  the Baqsimi from the pharmacy   -Keep on hand for treatment of low blood sugars    3. Pharmacist will coordinate pill packs & give you a call    4.  Pharmacist phone # [phone]

## 2022-04-05 ENCOUNTER — TELEPHONE (OUTPATIENT)
Dept: INTERNAL MEDICINE CLINIC | Age: 72
End: 2022-04-05

## 2022-04-05 VITALS
SYSTOLIC BLOOD PRESSURE: 138 MMHG | WEIGHT: 111.2 LBS | BODY MASS INDEX: 23.24 KG/M2 | TEMPERATURE: 97.5 F | HEART RATE: 76 BPM | DIASTOLIC BLOOD PRESSURE: 60 MMHG

## 2022-04-05 NOTE — TELEPHONE ENCOUNTER
Called office of Dr. Yumiko Samano to discuss pill packs through [facility]. [facility]. Osvaldo Nash has a visit next week with Dr. Portia Garcia - refills will be sent at this visit. LVM with Osvaldo Nash informing her of the plan.     For Pharmacy Admin Tracking Only   Time Spent (min): [address] Cam[address] Mcghee, PharmD, [facility]  Internal Medicine Clinical Pharmacist  [phone]

## 2022-04-19 ENCOUNTER — TELEPHONE (OUTPATIENT)
Dept: INTERNAL MEDICINE CLINIC | Age: 72
End: 2022-04-19

## 2022-04-19 NOTE — TELEPHONE ENCOUNTER
Followed-up regarding pill packs - Outpatient Pharmacy has not received prescriptions. Unable to reach Ina/ to discuss (line busy).

## 2022-05-02 ENCOUNTER — PHARMACY VISIT (OUTPATIENT)
Dept: INTERNAL MEDICINE CLINIC | Age: 72
End: 2022-05-02

## 2022-05-02 DIAGNOSIS — E10.8 TYPE 1 DIABETES MELLITUS WITH COMPLICATION (HCC): Primary | ICD-10-CM

## 2022-05-02 PROCEDURE — G0108 DIAB MANAGE TRN  PER INDIV: HCPCS | Performed by: INTERNAL MEDICINE

## 2022-05-02 NOTE — PATIENT INSTRUCTIONS
1. Keep up the great work Allan Gaytan! Your time in range has improved to 63% - much better! 2. Make sure you [facility] Sou[facility] Nguyen [facility]   -Be sure that atorvastatin (Lipitor) is included   -Carvedilol - should you be taking? 3. Try to bolus for your carbs before the largest meal of the day    4.  Try to download the Dexcom G6 yamil - you will need to set up an account, then enter your transmitter & sensor codes

## 2022-05-02 NOTE — PROGRESS NOTES
[facility]  [facility] [address] Shana Pinto, [address]  [phone] (phone)  [phone] (fax)    Patient ID: Sabrina Nazario 1950  Referring Provider: Dr. Ganesh Mendez; also sees Dr. Jaswinder Doyle      Patient's name and  were verified. Subjective:    She presents for Her follow-up diabetic visit. She has type 1 diabetes mellitus. Home regimen includes: Insulin She is compliant most of the time. Assessment:     Lab Results   Component Value Date    LABA1C 7.0 2021    LABA1C 6.6 2021    BUN 23 2022    CREATININE 1.5 2022    CREATININE 1.4 2013     There were no vitals filed for this visit. Wt Readings from Last 3 Encounters:   22 111 lb 3.2 oz (50.4 kg)   22 109 lb (49.4 kg)   22 109 lb 8 oz (49.7 kg)     Ht Readings from Last 3 Encounters:   22 4' 10\" (1.473 m)   21 4' 10\" (1.473 m)   21 4' 10\" (1.473 m)       Diabetes Pharmacotherapy:  Humalog per Tandem pump    Glucose Trends:   Current monitoring regimen: Dexcom CGM - checks 4+ times daily  Home blood sugar trends:    -V. High: 37%, High: 0%, Target: 63%, Low: 0%, V. Low: 0%   -Poor clearance of lunch/supper  Any episodes of hypoglycemia? no    Lifestyle Factors: did not review in depth today    Focus:   Diabetes Education. A1C: 7.3% (3/2022). CGM time in range has improved to 63%, which is significantly improved. Ina/her  are currently not bolusing for meals and are overwhelmed with the bolusing process. Reviewed the stops for bolusing extensively and recommended a bolus for the largest meal of every day. Ina's pill packs have not been started yet at [address] (awaiting prescriptions from Dr. Alex Cornejo). Finally, Ina/her  cannot hear Tandem alarms for hi/low sugars. Discussed connecting Dexcom to phone yamil as well as pump- assisted with download and log-in today.      DSME PLAN:   Discussed general issues about diabetes pathophysiology and management. Counseling at today's visit: diabetes technology - pump boluses. 1. Keep up the great work [employer]! Your time in range has improved to 63% - much better! 2. Make sure you talk to Dr. Clifton Pimentel about sending prescriptions to [facility] for pill packs   -Be sure that atorvastatin (Lipitor) is included   -Carvedilol - should you be taking? 3. Try to bolus for your carbs before the largest meal of the day    4. Try to download the Dexcom G6 yamil - you will need to set up an account, then enter your transmitter & sensor codes    Meter download, medications, PMH and nursing assessment reviewed. Sebastien Wagner states She is willing to participate in this plan of care and verbalized understanding of all instructions provided. Teach back used to verify comprehension. Follow-up: 3 month DM Clinic RN & Dr. Ricky Milligan, recommended a download in 1 month    Total time involved in direct patient education: 30 minutes.      For [address] in place:  Yes  Recommendation Provided To: Patient/Caregiver: 1 via In person  Intervention Detail: Device Training  Gap Closed?: No   Intervention Accepted By: Patient/Caregiver: 1  Time Spent (min): Christy Yo, [facility]  Internal Medicine Clinical Pharmacist  [phone]

## 2022-05-12 ENCOUNTER — TELEPHONE (OUTPATIENT)
Dept: INTERNAL MEDICINE CLINIC | Age: 72
End: 2022-05-12

## 2022-05-12 RX ORDER — MEMANTINE HYDROCHLORIDE 28 MG/1
28 CAPSULE, EXTENDED RELEASE ORAL DAILY
COMMUNITY

## 2022-05-12 NOTE — TELEPHONE ENCOUNTER
Attempted to reach Richard Dang and inform her of medications missing from her current pill packs:     Duloxetine  Amlodipine  Famotidine   Fenofibrate    Unable to reach patient or leave voicemail.

## 2022-05-12 NOTE — TELEPHONE ENCOUNTER
Called office of Dr. Prashant Seth regarding pill pack prescriptions. Reviewed which prescriptions have been sent. A couple prescriptions will need to be prescribed from cardiologist.       Trinidad Delgado/Clark,   Patient requests refills of the following medications be sent to [address] [place] [address] for pill packs.      Amlodipine 2.5 daily #30 tab  Famotidine 40mg BID #60 tab  Fenofibrate 135mg daily #30 tab    Thank you,    Rody Melgoza, PharmD, [facility]  Internal Medicine Clinical Pharmacist  [phone]

## 2022-07-07 DIAGNOSIS — E10.8 TYPE 1 DIABETES MELLITUS WITH COMPLICATION (HCC): ICD-10-CM

## 2022-07-07 RX ORDER — BLOOD-GLUCOSE SENSOR
EACH MISCELLANEOUS
Qty: 9 EACH | Refills: 1 | Status: SHIPPED | OUTPATIENT
Start: 2022-07-07

## 2022-07-16 VITALS
WEIGHT: 110.4 LBS | TEMPERATURE: 97.7 F | HEART RATE: 75 BPM | BODY MASS INDEX: 23.07 KG/M2 | SYSTOLIC BLOOD PRESSURE: 108 MMHG | DIASTOLIC BLOOD PRESSURE: 54 MMHG

## 2022-08-02 ENCOUNTER — OFFICE VISIT (OUTPATIENT)
Dept: INTERNAL MEDICINE CLINIC | Age: 72
End: 2022-08-02

## 2022-08-02 VITALS
TEMPERATURE: 97.3 F | HEART RATE: 76 BPM | DIASTOLIC BLOOD PRESSURE: 76 MMHG | BODY MASS INDEX: 22.71 KG/M2 | SYSTOLIC BLOOD PRESSURE: 132 MMHG | WEIGHT: 108.2 LBS | HEIGHT: 58 IN

## 2022-08-02 DIAGNOSIS — E10.8 TYPE 1 DIABETES MELLITUS WITH COMPLICATION (HCC): Primary | ICD-10-CM

## 2022-08-02 LAB — HBA1C MFR BLD: 8.2 %

## 2022-08-02 PROCEDURE — 83036 HEMOGLOBIN GLYCOSYLATED A1C: CPT | Performed by: INTERNAL MEDICINE

## 2022-08-02 NOTE — PROGRESS NOTES
[facility]  [address] [address] [address], [facility], [address]  [phone] (phone)  [phone] (fax)    Patient ID: Tristan Fields 1950  Referring Provider: Dr. Romana Rao     Patient's name and  were verified. Subjective:    She presents for Her follow-up diabetic visit. She has type 1 diabetes mellitus. Home regimen includes: Insulin She is noncompliant some of the time. Assessment:     Lab Results   Component Value Date/Time    LABA1C 8.2 2022 12:00 AM    BUN 31 2022 11:42 AM    CREATININE 1.4 2022 11:42 AM    CREATININE 1.4 2013 12:00 AM     Vitals:    22 1258   BP: 132/76   Site: Left Upper Arm   Position: Sitting   Pulse: 76   Temp: 97.3 °F (36.3 °C)   Weight: 108 lb 3.2 oz (49.1 kg)   Height: 4' 10\" (1.473 m)     Wt Readings from Last 3 Encounters:   22 108 lb 3.2 oz (49.1 kg)   22 110 lb 6.4 oz (50.1 kg)   22 111 lb 3.2 oz (50.4 kg)     Ht Readings from Last 3 Encounters:   22 4' 10\" (1.473 m)   22 4' 10\" (1.473 m)   21 4' 10\" (1.473 m)       Diabetes Pharmacotherapy:  Humalog insulin per Tandem CIQ 1pump    Glucose Trends:   Glucose at 1 hrs PPD today resulted at 209mg/dl  Current monitoring regimen: Dexcom CGM - checks 4+ times daily  Home blood sugar trends:    - High: 65%, Target: 35%, Low: 1%   -glucose excursions from meals r/t not bolusing for food  Any episodes of hypoglycemia? yes - 1-2 times per week   -Treats with skittles or orange juice    Lifestyle Factors:   Previous visit with dietician: remote  Current diet: B: toast/ pb            L: skips                       D: sandwich                       Snacks: nibbling carrots/ crackers                       Beverages: water; unsw tea; diet pepsi 1 per day  Current exercise: ADL's    Health Maintenance:  Eye exam current (within one year): yes Date: 2021, Dr. Liudmila Ramos.  Appt 22  Any history of foot problems? no  Foot exam up to date: yes Date: 3/22/22  Immunizations up to date:    Pneumonia: yes   COVID-19: yes  The 10-year ASCVD risk score (Aundra Denver., et al., 2013) is: 25.7%    Values used to calculate the score:      Age: 70 years      Sex: Female      Is Non- : No      Diabetic: Yes      Tobacco smoker: No      Systolic Blood Pressure: 782 mmHg      Is BP treated: Yes      HDL Cholesterol: 41 mg/dL      Total Cholesterol: 144 mg/dL   Last panel - LDL:   Lab Results   Component Value Date    LDLCALC 64 10/15/2021    LDLDIRECT 48 04/05/2021     Taking ASA:  Yes   Taking statin: Yes - atorvastatin  Last microalbumin/creatinine ratio:   Microalbumin Creatinine Ratio   Date Value Ref Range Status   06/25/2014 <6  Final      Taking ACE/ARB: No - not ordered  Depression screening completed. 3/22/22    Focus:   Diabetes Education. Last A1C: 8.2% today. [address]  is not bolusing for her meals. Low blood sugars are an issue as well. [address]  does fairly well with low BS treatment, but Jhoan Lee should have a Glucagon option available. [address]  is dealing with depression. She has little interest in things and she is getting weaker from minimal activity each day. Discussed exercise to increase her strength; Jhoan Lee states he will help encourage her. Reviewed the need to bolus for all food eaten to keep glucose levels in reasonable control. Curriculum Area Focus: Carbohydrate counting/meal planning, Physical activity goals, Hypoglycemia management, Lifestyle & healthy coping, and Diabetes distress & support  DSME PLAN:     Call Wayne Blizzard. Do they Baqsimi ready to ?                                                     Script 3/2022                     How much will cost?  Ask about the doctor (and your daughter) about thyroid  Bolus for everything you eat  (except for when you are eating for a low                                  Blood sugar)           When the food in front of you = go the bolus, add the grams                      Of carbohydrate and add the blood sugar and deliver a bolus  Try to get 15 minutes of activity every day. Can you walk with your                  Friend 2-3 times per week or use stretch bands every day                   Stretch --arms and legs              After meals is best --blood sugars are higher     Goals Addressed                   This Visit's Progress     Exercise 3x per week (30 min per time)   Not on track     Keep riding bike 20-30 mins 5 days per week. rotate sites for [facility]   Not on track           Meter download, medications, PMH and nursing assessment reviewed. Marybel Sams states She is willing to participate in this plan of care and verbalized understanding of all instructions provided. Teach back used to verify comprehension. Follow-up: 1 month    Total time involved in direct patient education: 60 minutes.

## 2022-08-02 NOTE — PATIENT INSTRUCTIONS
Call Nicole Funes. Do they Ba[facility] ready to ? Script 3/2022                     How much will cost?  Ask about the doctor (and your daughter) about thyroid  Bolus for everything you eat  (except for when you are eating for a low                                  Blood sugar)           When the food in front of you = go the bolus, add the grams                      Of carbohydrate and add the blood sugar and deliver a bolus  Try to get 15 minutes of activity every day.  Can you walk with your                  Friend 2-3 times per week or use stretch bands every day                   Stretch --arms and legs              After meals is best --blood sugars are higher

## 2022-08-15 ENCOUNTER — TELEPHONE (OUTPATIENT)
Dept: INTERNAL MEDICINE CLINIC | Age: 72
End: 2022-08-15

## 2022-08-18 NOTE — TELEPHONE ENCOUNTER
For [facility] in place:  Yes  Recommendation Provided To: Patient/Caregiver: 1 via Telephone  Intervention Detail: Refill(s) Provided  Gap Closed?: No   Intervention Accepted By: Patient/Caregiver: 1  Time Spent (min): 1000 Camille Mcghee, PharmD, [facility]  Internal Medicine Clinical Pharmacist  [phone]

## 2022-10-14 DIAGNOSIS — E10.8 TYPE 1 DIABETES MELLITUS WITH COMPLICATION (HCC): ICD-10-CM

## 2022-10-17 RX ORDER — BLOOD-GLUCOSE TRANSMITTER
EACH MISCELLANEOUS
Qty: 1 EACH | Refills: 0 | OUTPATIENT
Start: 2022-10-17

## 2022-10-26 ENCOUNTER — PHARMACY VISIT (OUTPATIENT)
Dept: INTERNAL MEDICINE CLINIC | Age: 72
End: 2022-10-26

## 2022-10-26 DIAGNOSIS — E10.8 TYPE 1 DIABETES MELLITUS WITH COMPLICATION (HCC): Primary | ICD-10-CM

## 2022-10-26 PROCEDURE — G0108 DIAB MANAGE TRN  PER INDIV: HCPCS | Performed by: INTERNAL MEDICINE

## 2022-10-26 NOTE — PATIENT INSTRUCTIONS
Be sure to stay in Control-IQ or automatic mode  -Look for the small vivi in the top right corner of the pump - this means Control-IQ is on   -To turn on Control-IQ: Go to Options -> My Pump -> Control-IQ -> Slide to green or \"on\"    2. Try to bolus for your carbs before meals    3. Pharmacist will talk to Dr. Nestora Collet about getting a new Dexcom transmitter     4.  Try to make a habit of charging your pump every day for around 10-15 minutes - while showering, during breakfast   -Consider portable charging block    Pharmacist phone number: [phone]

## 2022-10-26 NOTE — PROGRESS NOTES
[facility]  [address] Shana Pinto, [address]  [phone] (phone)  [phone] (fax)    Patient ID: Amanda Rodriguez 1950  Referring Provider: Dr. Leavy Holstein; also sees Dr. Dav Bush     Patient's name and  were verified. Subjective:    She presents for Her follow-up diabetic visit. She has type 1 diabetes mellitus. Home regimen includes: Insulin She is noncompliant some of the time. Assessment:     Lab Results   Component Value Date/Time    LABA1C 8.2 2022 12:00 AM    BUN 31 2022 11:42 AM    CREATININE 1.4 2022 11:42 AM    CREATININE 1.4 2013 12:00 AM     Wt Readings from Last 3 Encounters:   22 108 lb 3.2 oz (49.1 kg)   22 110 lb 6.4 oz (50.1 kg)   22 111 lb 3.2 oz (50.4 kg)     Ht Readings from Last 3 Encounters:   22 4' 10\" (1.473 m)   22 4' 10\" (1.473 m)   21 4' 10\" (1.473 m)       Diabetes Pharmacotherapy:  Humalog insulin per Tandem pump    Glucose Trends:   Current monitoring regimen: Dexcom CGM - checks 4+ times daily  Home blood sugar trends:    -V. High: 61%, High: 27%, Target: 12%, Low: <0.1%, V. Low: 0%  Any episodes of hypoglycemia? no   -Treats with OCAREN or skittles    Lifestyle Factors:   Previous visit with dietician: remote  Current diet: did not review in depth  Current exercise: did not review in depth    Health Maintenance:  Eye exam current (within one year): yes Date: 2021, Dr. Tyler Gomez. Appt 22  Any history of foot problems? no  Foot exam up to date: yes Date: 3/22/22  The 10-year ASCVD risk score (Radha HOPSON, et al., 2019) is: 28.4%  Last panel - LDL:   Lab Results   Component Value Date    LDLCALC 64 10/15/2021    LDLDIRECT 48 2021   Taking ASA:  No   Taking statin: Yes - atorvastatin  Last microalbumin/creatinine ratio: 22 (10/2021)  Taking ACE/ARB: No - not indicated    Focus:   Diabetes Education. Last A1C: 8.2% (22). Ina/her  are struggling with pump therapy.  The technology component and needing to keep the pump charged are her greatest barriers. She is flipped out of Control-IQ today due to being out of her Dexcom transmitter. TIR is only 12%. We restarted her Dexcom today with a sample sensor/transmitter and turned Control-IQ back on. We will try the pump again for 1 month, but if Nichole Pereira does not achieve better control, she would like to go back to insulin shots. Curriculum Area Focus: Glucose checks/goals and Diabetes Technology  DSME PLAN:     Be sure to stay in Control-IQ or automatic mode  -Look for the small vivi in the top right corner of the pump - this means Control-IQ is on   -To turn on Control-IQ: Go to Options -> My Pump -> Control-IQ -> Slide to green or \"on\"    2. Try to bolus for your carbs before meals    3. Pharmacist will talk to Dr. Georgette Auguste about getting a new Dexcom transmitter     4. Try to make a habit of charging your pump every day for around 10-15 minutes - while showering, during breakfast   -Consider portable charging block    Pharmacist phone number: [phone]       Goals Addressed    None          Meter download, medications, PMH and nursing assessment reviewed. Orquidea Spivey states She is willing to participate in this plan of care and verbalized understanding of all instructions provided. Teach back used to verify comprehension. Follow-up: 1 month PharmD    Total time involved in direct patient education: 60 minutes.      For [address] [facility] in place:  No  Recommendation Provided To: Patient/Caregiver: 2 via In person  Intervention Detail: Device Training and Patient Access Assistance/Sample Provided  Intervention Accepted By: Patient/Caregiver: 2  Gap Closed?: No   Time Spent (min): 8218 Osvaldo Vasquez, PharmD, [facility]  Internal Medicine Clinical Pharmacist  [phone]

## 2023-09-06 ENCOUNTER — OFFICE VISIT (OUTPATIENT)
Dept: INTERNAL MEDICINE CLINIC | Age: 73
End: 2023-09-06

## 2023-09-06 DIAGNOSIS — E10.8 TYPE 1 DIABETES MELLITUS WITH COMPLICATION (HCC): Primary | ICD-10-CM

## 2023-09-06 LAB — HBA1C MFR BLD: 9.7 % (ref 4.3–5.7)

## 2023-09-06 PROCEDURE — NBSRV NON-BILLABLE SERVICE

## 2023-09-06 NOTE — PROGRESS NOTES
management, and Diabetes Technology  DSME PLAN:     Bring medication list to the next visit    2. When you are eating or have high blood sugars, enter them in using the bolus button   -1 slice of bread or roll = 15 grams   -1/2 baked potato or serving home fries = 15 grams   -1/2 cup of rice, mashed potatoes = 15 grams   -1 cup pasta = 30 grams   -1 small apple, banana, serving of melon = 15 grams   -1 ear sweet corn = 20 grams   -ice cream = 20 grams    3. Treatment of hypoglycemia (low blood sugars):   -If sugar is below 90 mg/dL, treat with 15 grams of simple sugar/rapid acting carb (3-4 glucose tablets, 4 oz of regular juice, 1/2 can of pop, 5 sugar-containing candies, 1 tablespoon of honey, 13-15 sweet tarts). -Recheck in 15 minutes. If above 90 mg/dL, have small meal or snack. If not above 90 mg/dL, repeat the 15 grams of simple carbs until above 80 mg/dL. 4.  the Dexcom G6 from the pharmacy   -Recommend ordering overpatches: G6 Adhesive Patches Waterproof - 55 Pack Invisible Water-Resistant G6 Overpatch Transparent Stickers for G6-SUNITA    Pharmacist Phone number: [phone]       Goals Addressed                   This Visit's Progress     Blood Pressure < 130/80   116/57     Exercise 3x per week (30 min per time)   Not on track     Keep riding bike 20-30 mins 5 days per week. HEMOGLOBIN A1C < 7.5   9.7             Meter download, medications, PMH and nursing assessment reviewed. Mary Lou Brooke states She is willing to participate in this plan of care and verbalized understanding of all instructions provided. Teach back used to verify comprehension. Follow-up: 1.5 week PharmD    Total time involved in direct patient education: 60 minutes.    Individual Education appointment justified due to: Class Availability    For Pharmacy Admin Tracking Only    Program: Medical Group  CPA in place:  No  Recommendation Provided To: Patient/Caregiver: 1 via In person  Intervention Detail:

## 2023-09-06 NOTE — PATIENT INSTRUCTIONS
Bring medication list to the next visit    2. When you are eating or have high blood sugars, enter them in using the bolus button   -1 slice of bread or roll = 15 grams   -1/2 baked potato or serving home fries = 15 grams   -1/2 cup of rice, mashed potatoes = 15 grams   -1 cup pasta = 30 grams   -1 small apple, banana, serving of melon = 15 grams   -1 ear sweet corn = 20 grams   -ice cream = 20 grams    3. Treatment of hypoglycemia (low blood sugars):   -If sugar is below 90 mg/dL, treat with 15 grams of simple sugar/rapid acting carb (3-4 glucose tablets, 4 oz of regular juice, 1/2 can of pop, 5 sugar-containing candies, 1 tablespoon of honey, 13-15 sweet tarts). -Recheck in 15 minutes. If above 90 mg/dL, have small meal or snack. If not above 90 mg/dL, repeat the 15 grams of simple carbs until above 80 mg/dL.     4.  the Dexcom G6 from the pharmacy   -Recommend ordering overpatches: G6 Adhesive Patches Waterproof - 55 Pack Invisible Water-Resistant G6 Overpatch Transparent Stickers for G6-SUNITA    Pharmacist Phone number: [phone]

## 2023-09-18 ENCOUNTER — TELEPHONE (OUTPATIENT)
Dept: INTERNAL MEDICINE CLINIC | Age: 73
End: 2023-09-18

## 2023-09-18 ENCOUNTER — OFFICE VISIT (OUTPATIENT)
Dept: INTERNAL MEDICINE CLINIC | Age: 73
End: 2023-09-18

## 2023-09-18 VITALS
TEMPERATURE: 98 F | SYSTOLIC BLOOD PRESSURE: 111 MMHG | BODY MASS INDEX: 21.9 KG/M2 | DIASTOLIC BLOOD PRESSURE: 59 MMHG | WEIGHT: 104.8 LBS | HEART RATE: 75 BPM

## 2023-09-18 VITALS
HEART RATE: 86 BPM | WEIGHT: 103.6 LBS | TEMPERATURE: 97.9 F | SYSTOLIC BLOOD PRESSURE: 116 MMHG | DIASTOLIC BLOOD PRESSURE: 57 MMHG | BODY MASS INDEX: 21.65 KG/M2

## 2023-09-18 DIAGNOSIS — E10.8 TYPE 1 DIABETES MELLITUS WITH COMPLICATION (HCC): Primary | ICD-10-CM

## 2023-09-18 PROCEDURE — NBSRV NON-BILLABLE SERVICE

## 2023-09-18 NOTE — PATIENT INSTRUCTIONS
We are going to work on getting the Dexcom refill for you today or tomorrow    2. Try to enter a carb bolus for at least a portion of the carbs you eat    3. Pump Setting changes (pending approval of Dr. Lisa Monteiro)     Correction factor:    12p-4:30p: 32 -> 35 (weakened)      Basal rate:    4:30p-7p: 0.65 -> 0.6 (weakened)    4.  Check the bottom of your feet every day

## 2023-09-18 NOTE — TELEPHONE ENCOUNTER
**DM Clinic Recommendations - response requested**    [facility]  [facility] [address] [facility] [place]. [address] [facility], [address] [place] [address]  [phone] (phone)  [phone] (fax)    Patient ID: Corina Wilcox 1950  Referring Provider: Dr. Serge Villegas     Patient is being seen by the DM Clinic for T1DM. Diabetes Pharmacotherapy:  Humalog insulin per Tandem pump   -Using Control IQ    Glucose Trends:   Current monitoring regimen: Dexcom CGM - checks 4+ times daily  Home blood sugar trends:    -High: 51%, Target: 49%, Low: 0%,   -Average Glucose: 187mg/dL  Any episodes of hypoglycemia? yes - one episode of post-prandial low    Last A1C: 9.7% (9/18/23)    Recommendations:  -Patient requests a refill for her Dexcom supplies be sent to [facility] in [address] sensors - Use to check BG. Change every 10 days. #9, 3 refills   -Dexcom G6 transmitters  - Use to check BG. Change every 90 days. #1, 3 refills    -Patient requests that glucagon be sent to her local pharmacy - preference for [facility]    -Consider the following  Pump Setting changes to decrease Ina's risk for hypoglycemia:      Correction factor:    12p-4:30p: 32 -> 35 (weakened)      Basal rate:    4:30p-7p: 0.65 -> 0.6 (weakened)    Please call/fax back in response to the above pump setting adjustment.      Thank you,     Governor Rater, PharmD, BCPS, [facility]  Internal Medicine Clinical Pharmacist  [phone]

## 2023-10-10 ENCOUNTER — TELEPHONE (OUTPATIENT)
Dept: INTERNAL MEDICINE CLINIC | Age: 73
End: 2023-10-10

## 2023-10-10 NOTE — TELEPHONE ENCOUNTER
Per request, called patient and asked if she could bring up her pump and CGM for us to download. Patient stated that they would try to come up tomorrow or Thursday to have that done.

## 2023-10-19 ENCOUNTER — CLINICAL DOCUMENTATION (OUTPATIENT)
Dept: INTERNAL MEDICINE CLINIC | Age: 73
End: 2023-10-19
Payer: MEDICARE

## 2023-10-19 DIAGNOSIS — E10.8 TYPE 1 DIABETES MELLITUS WITH COMPLICATION (HCC): Primary | ICD-10-CM

## 2023-10-19 PROCEDURE — 95251 CONT GLUC MNTR ANALYSIS I&R: CPT | Performed by: INTERNAL MEDICINE

## 2023-10-19 NOTE — TELEPHONE ENCOUNTER
Reviewed Dexcom CGM and Tandem download(s). -Trends: High: 70%, Target: 30%, Low:0%   -Average Bmg/dL, Range 70 to 400mg/dL   -Control-IQ running 69% of the time    Assessment:   TIR is significantly worsened compared with the last download. Recommendations/Plan:   Pump setting adjustment considerations (to be discussed with Dr. Toi Walsh). -Carb Ratio: 15 -> 13     -Correction Factor:    12a-12p: 32-> 28   12p-4:30p: 35 -> 31   7p-12a: 26 -> 22      LVM with Agueda Arreaga to discuss reasons why glucose is so elevated.      For Pharmacy Admin Tracking Only    Program: Medical Group  CPA in place:  No  Recommendation Provided To: Provider: 1 via Fax sent to office  Intervention Detail: Dose Adjustment: 1, reason: Therapy Optimization  Intervention Accepted By: Provider: 1  Gap Closed?: No   Time Spent (min): [facility], PharmD, [address] [place] [address]  Internal Medicine Clinical Pharmacist  [phone]

## 2023-10-19 NOTE — PROGRESS NOTES
**CGM Interpretation**    Patient ID: Sonny Kilpatrick [id number]  Referring Provider: Dr. Jose J Marinelli    Patient is referred to the DM Clinic for Type 1 diabetes. Review of patient's CGM data revealed the following:   -Monitor type: Dexcom CGM   -Date Range: 23-10/10/23   Time CGM Active: 79%   -Trends: V.  High: 37%, High: 33%, Target: 30%, Low:0%, V. Low: 0%   -Average Bmg/dL   -GMI: n/a%   -Glucose variability: 71.5mg/dL    Interpretation:    TIR is below target and worsened since the last download  Poor mealtime clearance      Interpretation overseen by Dr. Jg Medeiros MD.     Recommendations/Plan:   Will discuss setting changes w/ referring provider

## 2023-11-22 ENCOUNTER — OFFICE VISIT (OUTPATIENT)
Dept: INTERNAL MEDICINE CLINIC | Age: 73
End: 2023-11-22

## 2023-11-22 VITALS
WEIGHT: 103.2 LBS | HEART RATE: 71 BPM | SYSTOLIC BLOOD PRESSURE: 138 MMHG | TEMPERATURE: 97.6 F | BODY MASS INDEX: 21.66 KG/M2 | DIASTOLIC BLOOD PRESSURE: 64 MMHG | HEIGHT: 58 IN

## 2023-11-22 DIAGNOSIS — E10.8 TYPE 1 DIABETES MELLITUS WITH COMPLICATION (HCC): Primary | ICD-10-CM

## 2023-11-22 RX ORDER — LANOLIN ALCOHOL/MO/W.PET/CERES
3 CREAM (GRAM) TOPICAL NIGHTLY
COMMUNITY

## 2023-11-22 RX ORDER — LEVOTHYROXINE SODIUM 0.1 MG/1
100 TABLET ORAL DAILY
COMMUNITY
Start: 2023-09-30

## 2023-11-22 RX ORDER — IBUPROFEN 600 MG/1
TABLET ORAL PRN
COMMUNITY
Start: 2023-09-18

## 2023-11-22 RX ORDER — MIDODRINE HYDROCHLORIDE 2.5 MG/1
2.5 TABLET ORAL DAILY
COMMUNITY
Start: 2023-06-30

## 2023-11-22 RX ORDER — ONDANSETRON 4 MG/1
4 TABLET, ORALLY DISINTEGRATING ORAL EVERY 4 HOURS PRN
COMMUNITY
Start: 2023-06-16

## 2023-11-22 RX ORDER — MEMANTINE HYDROCHLORIDE 14 MG/1
14 CAPSULE, EXTENDED RELEASE ORAL DAILY
COMMUNITY
Start: 2023-09-17

## 2023-11-22 RX ORDER — PSEUDOEPHEDRINE HCL 30 MG
100 TABLET ORAL 2 TIMES DAILY PRN
COMMUNITY
Start: 2023-06-16

## 2023-11-22 RX ORDER — LANOLIN ALCOHOL/MO/W.PET/CERES
400 CREAM (GRAM) TOPICAL DAILY
COMMUNITY
Start: 2022-11-03

## 2023-11-22 RX ORDER — MONTELUKAST SODIUM 10 MG/1
10 TABLET ORAL DAILY
COMMUNITY
Start: 2023-10-29

## 2023-11-22 ASSESSMENT — PATIENT HEALTH QUESTIONNAIRE - PHQ9
7. TROUBLE CONCENTRATING ON THINGS, SUCH AS READING THE NEWSPAPER OR WATCHING TELEVISION: 3
SUM OF ALL RESPONSES TO PHQ QUESTIONS 1-9: 18
4. FEELING TIRED OR HAVING LITTLE ENERGY: 3
8. MOVING OR SPEAKING SO SLOWLY THAT OTHER PEOPLE COULD HAVE NOTICED. OR THE OPPOSITE, BEING SO FIGETY OR RESTLESS THAT YOU HAVE BEEN MOVING AROUND A LOT MORE THAN USUAL: 1
1. LITTLE INTEREST OR PLEASURE IN DOING THINGS: 3
6. FEELING BAD ABOUT YOURSELF - OR THAT YOU ARE A FAILURE OR HAVE LET YOURSELF OR YOUR FAMILY DOWN: 1
SUM OF ALL RESPONSES TO PHQ QUESTIONS 1-9: 18
5. POOR APPETITE OR OVEREATING: 1
SUM OF ALL RESPONSES TO PHQ9 QUESTIONS 1 & 2: 6
3. TROUBLE FALLING OR STAYING ASLEEP: 3
SUM OF ALL RESPONSES TO PHQ QUESTIONS 1-9: 18
2. FEELING DOWN, DEPRESSED OR HOPELESS: 3
SUM OF ALL RESPONSES TO PHQ QUESTIONS 1-9: 18
10. IF YOU CHECKED OFF ANY PROBLEMS, HOW DIFFICULT HAVE THESE PROBLEMS MADE IT FOR YOU TO DO YOUR WORK, TAKE CARE OF THINGS AT HOME, OR GET ALONG WITH OTHER PEOPLE: 2

## 2023-11-22 NOTE — PATIENT INSTRUCTIONS
When ready to get [facility] emergency shots again         -->call your insurance company: do they cover [employer]                                     Or Balarryimi  Reach out to [facility] fro replacements for low sensors          Dexcom. com-->support-->contact us--> product support  Bolus for all meals:        Protein bars  = 18 grams        Meals = use the carbs on the label        Drumstick = 30 grams  Avelina Benny is in charge of boluses--bolus when meal is put in the microwave                   Or before eating protein bar or snack

## 2023-12-15 ENCOUNTER — NURSE ONLY (OUTPATIENT)
Age: 73
End: 2023-12-15

## 2023-12-15 LAB
ALBUMIN SERPL BCG-MCNC: 4 G/DL (ref 3.5–5.1)
ALP SERPL-CCNC: 85 U/L (ref 38–126)
ALT SERPL W/O P-5'-P-CCNC: 23 U/L (ref 11–66)
ANION GAP SERPL CALC-SCNC: 10 MEQ/L (ref 8–16)
AST SERPL-CCNC: 37 U/L (ref 5–40)
BASOPHILS ABSOLUTE: 0 THOU/MM3 (ref 0–0.1)
BASOPHILS NFR BLD AUTO: 0.4 %
BILIRUB SERPL-MCNC: 0.9 MG/DL (ref 0.3–1.2)
BUN SERPL-MCNC: 26 MG/DL (ref 7–22)
CALCIUM SERPL-MCNC: 10.1 MG/DL (ref 8.5–10.5)
CHLORIDE SERPL-SCNC: 99 MEQ/L (ref 98–111)
CO2 SERPL-SCNC: 31 MEQ/L (ref 23–33)
CREAT SERPL-MCNC: 1.1 MG/DL (ref 0.4–1.2)
DEPRECATED RDW RBC AUTO: 42.6 FL (ref 35–45)
EOSINOPHIL NFR BLD AUTO: 1.3 %
EOSINOPHILS ABSOLUTE: 0.1 THOU/MM3 (ref 0–0.4)
ERYTHROCYTE [DISTWIDTH] IN BLOOD BY AUTOMATED COUNT: 13 % (ref 11.5–14.5)
GFR SERPL CREATININE-BSD FRML MDRD: 53 ML/MIN/1.73M2
GLUCOSE SERPL-MCNC: 72 MG/DL (ref 70–108)
HCT VFR BLD AUTO: 42.6 % (ref 37–47)
HGB BLD-MCNC: 13.9 GM/DL (ref 12–16)
IMM GRANULOCYTES # BLD AUTO: 0.02 THOU/MM3 (ref 0–0.07)
IMM GRANULOCYTES NFR BLD AUTO: 0.4 %
LYMPHOCYTES ABSOLUTE: 1 THOU/MM3 (ref 1–4.8)
LYMPHOCYTES NFR BLD AUTO: 21.7 %
MAGNESIUM SERPL-MCNC: 1.9 MG/DL (ref 1.6–2.4)
MCH RBC QN AUTO: 29.3 PG (ref 26–33)
MCHC RBC AUTO-ENTMCNC: 32.6 GM/DL (ref 32.2–35.5)
MCV RBC AUTO: 89.9 FL (ref 81–99)
MONOCYTES ABSOLUTE: 0.8 THOU/MM3 (ref 0.4–1.3)
MONOCYTES NFR BLD AUTO: 18.1 %
NEUTROPHILS NFR BLD AUTO: 58.1 %
NRBC BLD AUTO-RTO: 0 /100 WBC
PLATELET # BLD AUTO: 121 THOU/MM3 (ref 130–400)
PMV BLD AUTO: 13.2 FL (ref 9.4–12.4)
POTASSIUM SERPL-SCNC: 4 MEQ/L (ref 3.5–5.2)
PROT SERPL-MCNC: 7.1 G/DL (ref 6.1–8)
RBC # BLD AUTO: 4.74 MILL/MM3 (ref 4.2–5.4)
SEGMENTED NEUTROPHILS ABSOLUTE COUNT: 2.6 THOU/MM3 (ref 1.8–7.7)
SODIUM SERPL-SCNC: 140 MEQ/L (ref 135–145)
TSH SERPL DL<=0.005 MIU/L-ACNC: 0.03 UIU/ML (ref 0.4–4.2)
WBC # BLD AUTO: 4.5 THOU/MM3 (ref 4.8–10.8)

## 2024-01-01 NOTE — TELEPHONE ENCOUNTER
Hickeyangy Lemons instructed on insulin pump changes:  Increase carb ratio 12am from 8 grams to 7.5 grams and 11am 10 grams to 9.5 grams.   Hickey Cristo and  voiced understanding of these changes via teach back and changes are made .

## 2024-01-11 ENCOUNTER — TELEPHONE (OUTPATIENT)
Dept: INTERNAL MEDICINE CLINIC | Age: 74
End: 2024-01-11

## 2024-01-11 NOTE — TELEPHONE ENCOUNTER
----- Message from Liya Euceda LMT sent at 1/10/2024  1:19 PM EST -----  Regarding: FW: Infusion sets  Contact: [phone]    ----- Message -----  From: Ina Cotter  Sent: 1/10/2024   1:09 PM EST  To: [facility]. Clinical Staff  Subject: Infusion sets                                    Partly yes-But also Mom was in the ER Saturday night due to low BP and dehydration.  When they took bloodwork her sugar was 180 and within 2 hours it was down to 55 on her pump (50 with a finger prick).  The ER Dr said her settings may need adjusted as it seemed it was too aggressive...She (and Dad) both think the pump is not working accurately and asked me again about the G7.  Her [facility] balance is over $800 and they just don't have that kind of extra money.  Since that bill was issued they both have been moved to Medicaid.  I don't know what else to do honestly...Thank you for the information and I'll see what I can find out.  -Cj

## 2024-01-11 NOTE — TELEPHONE ENCOUNTER
Bob back to Elicia regarding low blood sugar episode. Is there a pattern of low blood sugars or 1 random episode--left a message to call the clinic with an update.

## 2024-01-24 ENCOUNTER — OFFICE VISIT (OUTPATIENT)
Dept: INTERNAL MEDICINE CLINIC | Age: 74
End: 2024-01-24

## 2024-01-24 VITALS
WEIGHT: 104 LBS | SYSTOLIC BLOOD PRESSURE: 116 MMHG | HEART RATE: 63 BPM | TEMPERATURE: 97.6 F | BODY MASS INDEX: 21.74 KG/M2 | DIASTOLIC BLOOD PRESSURE: 55 MMHG

## 2024-01-24 DIAGNOSIS — E10.8 TYPE 1 DIABETES MELLITUS WITH COMPLICATION (HCC): Primary | ICD-10-CM

## 2024-01-24 PROCEDURE — NBSRV NON-BILLABLE SERVICE

## 2024-01-24 RX ORDER — ISOSORBIDE MONONITRATE 60 MG/1
60 TABLET, EXTENDED RELEASE ORAL DAILY
COMMUNITY
Start: 2024-01-12

## 2024-01-24 RX ORDER — LEVOTHYROXINE SODIUM 88 UG/1
88 TABLET ORAL DAILY
COMMUNITY

## 2024-01-24 NOTE — PROGRESS NOTES
Patient case was reviewed with Lauren Sheppard, IrwinD, PGY-2 Resident; I agree with the assessment/plan in Lauren's note. I independently assessed the patient and provided additional education.     Total time involved in direct patient education: 60 minutes.     For Pharmacy Admin Tracking Only    Program: Medical Group  CPA in place:  No  Recommendation Provided To: Provider: 2 via Fax sent to office  Intervention Detail: New Rx: 1, reason: Patient Preference and Refill(s) Provided  Intervention Accepted By: Provider: 2  Gap Closed?: No   Time Spent (min):  70        Hanna Arkenberg, PharmD, BCPS, [facility]  Internal Medicine Clinical Pharmacist  [phone]

## 2024-01-24 NOTE — PROGRESS NOTES
[facility]  [address] [place] [address]  [phone] (phone)  [phone] (fax)    Patient ID: Ina Cotter 1950  Referring Provider: Dr. CAROLINA Peralta      Patient's name and  were verified.    Subjective:    She presents for a follow-up diabetic visit. She has type 1 diabetes mellitus. She is compliant some of the time.  Assessment:     Lab Results   Component Value Date/Time    LABA1C 9.7 2023 08:40 AM    LABA1C 8.2 2022 12:00 AM    BUN 26 12/15/2023 10:20 AM    CREATININE 1.1 12/15/2023 10:20 AM    CREATININE 1.4 2013 12:00 AM     Vitals:    24 1159   BP: (!) 116/55   Pulse: 63   Temp: 97.6 °F (36.4 °C)   Weight: 47.2 kg (104 lb)     Wt Readings from Last 3 Encounters:   24 47.2 kg (104 lb)   23 46.8 kg (103 lb 3.2 oz)   23 47.5 kg (104 lb 12.8 oz)     Ht Readings from Last 3 Encounters:   23 1.473 m (4' 10\")   22 1.473 m (4' 10\")   22 1.473 m (4' 10\")     Est, Glom Filt Rate   Date Value Ref Range Status   12/15/2023 53 (A) >60 ml/min/1.73m2 Final     Comment:     Pediatric calculator link  https://www.kidney.org/professionals/kdoqi/gfr_calculatorped  Effective Oct 3, 2022  These results are not intended for use in patients  <18 years of age.  eGFR results are calculated without a race factor  using the  CKD-EPI equation.  Careful clinical  correlation is recommended, particularly when comparing  to results calculated using previous equations. The  CKD-EPI equation is less accurate in patients with  extremes of muscle mass, extra-renal metabolism of  creatinine, excessive creatine ingestion, or following  therapy that affects renal tubular secretion.  Performed at [facility] [address] [place] [id number]         Diabetes Pharmacotherapy:  Humalog insulin per Tandem pump - Uses Control-IQ      Glucose Trends:   Current monitoring regimen: Dexcom CGM - checks 4+ times daily  Home blood sugar trends:

## 2024-01-24 NOTE — PATIENT INSTRUCTIONS
Bolus for all meals:    Protein bars  = 18 grams - put a reminder on the Synthroid bottle    Meals = use the carbs on the label = put a reminder on the microwave    2. We will send for the Dexcom G7 sensors at [facility] (pending approval of Dr. Peralta)   -We will call to let you know what steps are needed to upgrade the Tandem pump    3. Let the clinic know if you have anymore issues with low sugars    4. We will send an order for the pump supplies to [facility]     Plan to upload through the Tandem t connect portal from your home computer in 2 weeks

## 2024-01-31 ENCOUNTER — ENROLLMENT (OUTPATIENT)
Dept: INTERNAL MEDICINE CLINIC | Age: 74
End: 2024-01-31

## 2024-02-20 ENCOUNTER — TELEPHONE (OUTPATIENT)
Dept: INTERNAL MEDICINE CLINIC | Age: 74
End: 2024-02-20

## 2024-02-20 NOTE — TELEPHONE ENCOUNTER
Follow up in 6 months for symptom re-check with Lynsey Stewart CNP    It was a pleasure meeting with you today.  Thank you for allowing me and my team the privilege of caring for you today.  YOU are the reason we are here, and I truly hope we provided you with the excellent service you deserve.  Please let us know if there is anything else we can do for you so that we can be sure you are leaving completely satisfied with your care experience.           Sent instruction to Ina's daughter, Elicia for home upload instructions and Dexcom G7 upgrade instructions.     LVM with Dr. Peralta office about the Dexcom G7 prescription.     For Pharmacy Admin Tracking Only    Program: Medical Group  CPA in place:  No  Recommendation Provided To: Provider: 1 via Called provider office and Patient/Caregiver: 1 via Telephone  Time Spent (min): 15        Hanna Lunsford PharmD, BCPS, [facility]  Internal Medicine Clinical Pharmacist  [phone]

## 2024-03-06 ENCOUNTER — TELEPHONE (OUTPATIENT)
Dept: INTERNAL MEDICINE CLINIC | Age: 74
End: 2024-03-06

## 2024-03-14 ENCOUNTER — OFFICE VISIT (OUTPATIENT)
Dept: INTERNAL MEDICINE CLINIC | Age: 74
End: 2024-03-14

## 2024-03-14 DIAGNOSIS — E10.8 TYPE 1 DIABETES MELLITUS WITH COMPLICATION (HCC): Primary | ICD-10-CM

## 2024-03-14 LAB — HBA1C MFR BLD: 10.7 % (ref 4.3–5.7)

## 2024-05-13 ENCOUNTER — TELEPHONE (OUTPATIENT)
Dept: INTERNAL MEDICINE CLINIC | Age: 74
End: 2024-05-13

## 2024-05-13 NOTE — TELEPHONE ENCOUNTER
Patient did not show for her appointment on 5/7/2024.  Called and left message asking for a return call to schedule.

## 2024-05-15 ENCOUNTER — TELEPHONE (OUTPATIENT)
Dept: INTERNAL MEDICINE CLINIC | Age: 74
End: 2024-05-15

## 2024-05-28 ENCOUNTER — TELEPHONE (OUTPATIENT)
Dept: INTERNAL MEDICINE CLINIC | Age: 74
End: 2024-05-28

## 2024-08-06 ENCOUNTER — OFFICE VISIT (OUTPATIENT)
Dept: INTERNAL MEDICINE CLINIC | Age: 74
End: 2024-08-06

## 2024-08-06 VITALS
WEIGHT: 102 LBS | SYSTOLIC BLOOD PRESSURE: 122 MMHG | BODY MASS INDEX: 21.32 KG/M2 | TEMPERATURE: 97.2 F | HEART RATE: 61 BPM | DIASTOLIC BLOOD PRESSURE: 72 MMHG

## 2024-08-06 DIAGNOSIS — E10.8 TYPE 1 DIABETES MELLITUS WITH COMPLICATION (HCC): Primary | ICD-10-CM

## 2024-08-06 LAB — HBA1C MFR BLD: 11.1 % (ref 4.3–5.7)

## 2024-08-06 PROCEDURE — NBSRV NON-BILLABLE SERVICE: Performed by: PHARMACIST

## 2024-08-06 RX ORDER — ACYCLOVIR 400 MG/1
TABLET ORAL
COMMUNITY

## 2024-08-06 ASSESSMENT — PATIENT HEALTH QUESTIONNAIRE - PHQ9
1. LITTLE INTEREST OR PLEASURE IN DOING THINGS: NEARLY EVERY DAY
SUM OF ALL RESPONSES TO PHQ QUESTIONS 1-9: 6
2. FEELING DOWN, DEPRESSED OR HOPELESS: NEARLY EVERY DAY
SUM OF ALL RESPONSES TO PHQ QUESTIONS 1-9: 6
SUM OF ALL RESPONSES TO PHQ9 QUESTIONS 1 & 2: 6
SUM OF ALL RESPONSES TO PHQ QUESTIONS 1-9: 6
SUM OF ALL RESPONSES TO PHQ QUESTIONS 1-9: 6

## 2024-08-06 NOTE — PROGRESS NOTES
[facility]  [address] [place] [address]  [phone] (phone)  [phone] (fax)    Patient ID: Ina Cotter 1950  Referring Provider: Dr. ASHLEY Peralta      Patient's name and  were verified.    Subjective:    She presents for a follow-up diabetic visit. She has type 1 diabetes mellitus. She is compliant all of the time.  Assessment:     Lab Results   Component Value Date/Time    LABA1C 11.1 2024 07:51 AM    LABA1C 8.2 2022 12:00 AM    BUN 26 12/15/2023 10:20 AM    CREATININE 1.1 12/15/2023 10:20 AM    CREATININE 1.4 2013 12:00 AM     Vitals:    24 0800   BP: 122/72   Pulse: 61   Temp: 97.2 °F (36.2 °C)   Weight: 46.3 kg (102 lb)     Wt Readings from Last 3 Encounters:   24 46.3 kg (102 lb)   24 47.2 kg (104 lb)   23 46.8 kg (103 lb 3.2 oz)     Ht Readings from Last 3 Encounters:   23 1.473 m (4' 10\")   22 1.473 m (4' 10\")   22 1.473 m (4' 10\")     Est, Glom Filt Rate   Date Value Ref Range Status   12/15/2023 53 (A) >60 ml/min/1.73m2 Final     Diabetes Pharmacotherapy:  Humalog insulin per Tandem pump - Control-IQ off    Glucose Trends:   Current monitoring regimen: Dexcom CGM - checks 4+ times daily  Home blood sugar trends:    -V. High: 76%, High: 16%, Target: 7.8%, Low: 0%, V. Low: 0%   -Average Glucose: 302mg/dL; GMI: 10.5%;  %time CGM active 56%           Any episodes of hypoglycemia? Not recently (2-3 weeks ago)   -Treats with skittles, glucose tablets     Lifestyle Factors:   Previous visit with dietician: no   Current diet: B: eggs w/ potatoes and protein (ham, sausage)            L: skips                       D: gets meals delivered (40-42 carbs)                       Beverages: 3-4 bottles of water/day  Current exercise: none- sedentary    Health Maintenance:  Diabetes Management   Topic Date Due    Diabetic Alb to Cr ratio (uACR) test  10/15/2022    Lipids  02/10/2023    Diabetic retinal exam  2024       Eye

## 2024-08-06 NOTE — PROGRESS NOTES
Patient case was reviewed with Brigida Ojeda PharmD, PGY-2 Resident; I agree with the assessment/plan in Brigida's note. I independently assessed the patient and provided additional education.     Total time involved in direct patient education: 30 minutes.     For Pharmacy Admin Tracking Only    Program: Medical Group  Time Spent (min): 30        Hanna Lunsford PharmD, BCPS, [facility]  Internal Medicine Clinical Pharmacist  [phone]

## 2024-08-06 NOTE — PATIENT INSTRUCTIONS
-Stop in for a download in around 2 weeks (- 8 am-4 pm)    -If you have a Dexcom sensor fall off or  early, please call the Dexcom company to request replacement: [phone]     -Today we turned on Control-IQ and turned off auto-off alarm

## 2024-09-12 ENCOUNTER — OFFICE VISIT (OUTPATIENT)
Dept: INTERNAL MEDICINE CLINIC | Age: 74
End: 2024-09-12

## 2024-09-12 VITALS
WEIGHT: 105 LBS | SYSTOLIC BLOOD PRESSURE: 132 MMHG | TEMPERATURE: 98.4 F | HEART RATE: 73 BPM | BODY MASS INDEX: 21.95 KG/M2 | DIASTOLIC BLOOD PRESSURE: 62 MMHG

## 2024-09-12 DIAGNOSIS — E10.8 TYPE 1 DIABETES MELLITUS WITH COMPLICATION (HCC): Primary | ICD-10-CM

## 2024-09-12 RX ORDER — DOCUSATE SODIUM 100 MG/1
100 CAPSULE, LIQUID FILLED ORAL 2 TIMES DAILY PRN
COMMUNITY

## 2024-11-08 ENCOUNTER — HOSPITAL ENCOUNTER (INPATIENT)
Age: 74
LOS: 5 days | Discharge: NURSING, REHAB OR LONG-TERM CARE | DRG: 065 | End: 2024-11-14
Attending: FAMILY MEDICINE
Payer: MEDICARE

## 2024-11-08 ENCOUNTER — APPOINTMENT (OUTPATIENT)
Dept: GENERAL RADIOLOGY | Age: 74
DRG: 065 | End: 2024-11-08
Payer: MEDICARE

## 2024-11-08 ENCOUNTER — APPOINTMENT (OUTPATIENT)
Dept: CT IMAGING | Age: 74
DRG: 065 | End: 2024-11-08
Payer: MEDICARE

## 2024-11-08 DIAGNOSIS — I60.9 SUBARACHNOID BLEED (HCC): ICD-10-CM

## 2024-11-08 DIAGNOSIS — R41.82 ALTERED MENTAL STATUS, UNSPECIFIED ALTERED MENTAL STATUS TYPE: Primary | ICD-10-CM

## 2024-11-08 DIAGNOSIS — E87.1 HYPONATREMIA: ICD-10-CM

## 2024-11-08 LAB
ALBUMIN SERPL BCG-MCNC: 3.9 G/DL (ref 3.5–5.1)
ALP SERPL-CCNC: 67 U/L (ref 38–126)
ALT SERPL W/O P-5'-P-CCNC: 18 U/L (ref 11–66)
ANION GAP SERPL CALC-SCNC: 12 MEQ/L (ref 8–16)
AST SERPL-CCNC: 24 U/L (ref 5–40)
BILIRUB SERPL-MCNC: 1.6 MG/DL (ref 0.3–1.2)
BUN SERPL-MCNC: 22 MG/DL (ref 7–22)
CALCIUM SERPL-MCNC: 9.6 MG/DL (ref 8.5–10.5)
CHLORIDE SERPL-SCNC: 95 MEQ/L (ref 98–111)
CO2 SERPL-SCNC: 25 MEQ/L (ref 23–33)
CREAT SERPL-MCNC: 0.9 MG/DL (ref 0.4–1.2)
GFR SERPL CREATININE-BSD FRML MDRD: 67 ML/MIN/1.73M2
GLUCOSE SERPL-MCNC: 248 MG/DL (ref 70–108)
LACTIC ACID, SEPSIS: 1.1 MMOL/L (ref 0.5–1.9)
OSMOLALITY SERPL CALC.SUM OF ELEC: 276.2 MOSMOL/KG (ref 275–300)
POTASSIUM SERPL-SCNC: 4.4 MEQ/L (ref 3.5–5.2)
PROT SERPL-MCNC: 7 G/DL (ref 6.1–8)
SODIUM SERPL-SCNC: 132 MEQ/L (ref 135–145)

## 2024-11-08 PROCEDURE — 71045 X-RAY EXAM CHEST 1 VIEW: CPT

## 2024-11-08 PROCEDURE — 85025 COMPLETE CBC W/AUTO DIFF WBC: CPT

## 2024-11-08 PROCEDURE — 72125 CT NECK SPINE W/O DYE: CPT

## 2024-11-08 PROCEDURE — 83605 ASSAY OF LACTIC ACID: CPT

## 2024-11-08 PROCEDURE — 99291 CRITICAL CARE FIRST HOUR: CPT

## 2024-11-08 PROCEDURE — 70496 CT ANGIOGRAPHY HEAD: CPT

## 2024-11-08 PROCEDURE — 80053 COMPREHEN METABOLIC PANEL: CPT

## 2024-11-08 PROCEDURE — 70498 CT ANGIOGRAPHY NECK: CPT

## 2024-11-08 PROCEDURE — 36415 COLL VENOUS BLD VENIPUNCTURE: CPT

## 2024-11-08 PROCEDURE — 70450 CT HEAD/BRAIN W/O DYE: CPT

## 2024-11-08 RX ORDER — IOPAMIDOL 755 MG/ML
80 INJECTION, SOLUTION INTRAVASCULAR
Status: COMPLETED | OUTPATIENT
Start: 2024-11-08 | End: 2024-11-09

## 2024-11-09 ENCOUNTER — APPOINTMENT (OUTPATIENT)
Dept: MRI IMAGING | Age: 74
DRG: 065 | End: 2024-11-09
Payer: MEDICARE

## 2024-11-09 PROBLEM — I60.9 SUBARACHNOID BLEED (HCC): Status: ACTIVE | Noted: 2024-11-09

## 2024-11-09 PROBLEM — R41.82 ALTERED MENTAL STATUS: Status: ACTIVE | Noted: 2024-11-09

## 2024-11-09 LAB
ANION GAP SERPL CALC-SCNC: 11 MEQ/L (ref 8–16)
BASOPHILS ABSOLUTE: 0 THOU/MM3 (ref 0–0.1)
BASOPHILS NFR BLD AUTO: 0.4 %
BUN SERPL-MCNC: 20 MG/DL (ref 7–22)
CALCIUM SERPL-MCNC: 9.4 MG/DL (ref 8.5–10.5)
CHLORIDE SERPL-SCNC: 96 MEQ/L (ref 98–111)
CO2 SERPL-SCNC: 27 MEQ/L (ref 23–33)
CREAT SERPL-MCNC: 0.9 MG/DL (ref 0.4–1.2)
DEPRECATED RDW RBC AUTO: 39.6 FL (ref 35–45)
DEPRECATED RDW RBC AUTO: 40.6 FL (ref 35–45)
EOSINOPHIL NFR BLD AUTO: 0.6 %
EOSINOPHILS ABSOLUTE: 0 THOU/MM3 (ref 0–0.4)
ERYTHROCYTE [DISTWIDTH] IN BLOOD BY AUTOMATED COUNT: 12.2 % (ref 11.5–14.5)
ERYTHROCYTE [DISTWIDTH] IN BLOOD BY AUTOMATED COUNT: 12.3 % (ref 11.5–14.5)
GFR SERPL CREATININE-BSD FRML MDRD: 67 ML/MIN/1.73M2
GLUCOSE BLD STRIP.AUTO-MCNC: 208 MG/DL (ref 70–108)
GLUCOSE BLD STRIP.AUTO-MCNC: 220 MG/DL (ref 70–108)
GLUCOSE BLD STRIP.AUTO-MCNC: 236 MG/DL (ref 70–108)
GLUCOSE BLD STRIP.AUTO-MCNC: 283 MG/DL (ref 70–108)
GLUCOSE SERPL-MCNC: 227 MG/DL (ref 70–108)
HCT VFR BLD AUTO: 38 % (ref 37–47)
HCT VFR BLD AUTO: 39.5 % (ref 37–47)
HGB BLD-MCNC: 12.8 GM/DL (ref 12–16)
HGB BLD-MCNC: 13.3 GM/DL (ref 12–16)
IMM GRANULOCYTES # BLD AUTO: 0.02 THOU/MM3 (ref 0–0.07)
IMM GRANULOCYTES NFR BLD AUTO: 0.4 %
LYMPHOCYTES ABSOLUTE: 0.8 THOU/MM3 (ref 1–4.8)
LYMPHOCYTES NFR BLD AUTO: 16.8 %
MCH RBC QN AUTO: 29.8 PG (ref 26–33)
MCH RBC QN AUTO: 30.5 PG (ref 26–33)
MCHC RBC AUTO-ENTMCNC: 33.7 GM/DL (ref 32.2–35.5)
MCHC RBC AUTO-ENTMCNC: 33.7 GM/DL (ref 32.2–35.5)
MCV RBC AUTO: 88.6 FL (ref 81–99)
MCV RBC AUTO: 90.6 FL (ref 81–99)
MONOCYTES ABSOLUTE: 0.5 THOU/MM3 (ref 0.4–1.3)
MONOCYTES NFR BLD AUTO: 10.6 %
NEUTROPHILS ABSOLUTE: 3.6 THOU/MM3 (ref 1.8–7.7)
NEUTROPHILS NFR BLD AUTO: 71.2 %
NRBC BLD AUTO-RTO: 0 /100 WBC
PLATELET # BLD AUTO: 93 THOU/MM3 (ref 130–400)
PLATELET # BLD AUTO: 94 THOU/MM3 (ref 130–400)
PLATELET BLD QL SMEAR: ABNORMAL
PMV BLD AUTO: 11.7 FL (ref 9.4–12.4)
PMV BLD AUTO: 11.7 FL (ref 9.4–12.4)
POTASSIUM SERPL-SCNC: 3.9 MEQ/L (ref 3.5–5.2)
RBC # BLD AUTO: 4.29 MILL/MM3 (ref 4.2–5.4)
RBC # BLD AUTO: 4.36 MILL/MM3 (ref 4.2–5.4)
SCAN OF BLOOD SMEAR: NORMAL
SODIUM SERPL-SCNC: 134 MEQ/L (ref 135–145)
WBC # BLD AUTO: 4.3 THOU/MM3 (ref 4.8–10.8)
WBC # BLD AUTO: 5 THOU/MM3 (ref 4.8–10.8)

## 2024-11-09 PROCEDURE — 2500000003 HC RX 250 WO HCPCS: Performed by: FAMILY MEDICINE

## 2024-11-09 PROCEDURE — 99223 1ST HOSP IP/OBS HIGH 75: CPT | Performed by: INTERNAL MEDICINE

## 2024-11-09 PROCEDURE — 97166 OT EVAL MOD COMPLEX 45 MIN: CPT

## 2024-11-09 PROCEDURE — 85027 COMPLETE CBC AUTOMATED: CPT

## 2024-11-09 PROCEDURE — 6370000000 HC RX 637 (ALT 250 FOR IP)

## 2024-11-09 PROCEDURE — 92610 EVALUATE SWALLOWING FUNCTION: CPT

## 2024-11-09 PROCEDURE — 6360000004 HC RX CONTRAST MEDICATION: Performed by: FAMILY MEDICINE

## 2024-11-09 PROCEDURE — 97535 SELF CARE MNGMENT TRAINING: CPT

## 2024-11-09 PROCEDURE — 36415 COLL VENOUS BLD VENIPUNCTURE: CPT

## 2024-11-09 PROCEDURE — 99222 1ST HOSP IP/OBS MODERATE 55: CPT | Performed by: NEUROLOGICAL SURGERY

## 2024-11-09 PROCEDURE — 80048 BASIC METABOLIC PNL TOTAL CA: CPT

## 2024-11-09 PROCEDURE — 70551 MRI BRAIN STEM W/O DYE: CPT

## 2024-11-09 PROCEDURE — 82948 REAGENT STRIP/BLOOD GLUCOSE: CPT

## 2024-11-09 PROCEDURE — 2100000000 HC CCU R&B

## 2024-11-09 PROCEDURE — 2580000003 HC RX 258

## 2024-11-09 RX ORDER — LEVOTHYROXINE SODIUM 88 UG/1
88 TABLET ORAL DAILY
Status: DISCONTINUED | OUTPATIENT
Start: 2024-11-09 | End: 2024-11-14 | Stop reason: HOSPADM

## 2024-11-09 RX ORDER — LANOLIN ALCOHOL/MO/W.PET/CERES
400 CREAM (GRAM) TOPICAL DAILY
Status: DISCONTINUED | OUTPATIENT
Start: 2024-11-09 | End: 2024-11-14 | Stop reason: HOSPADM

## 2024-11-09 RX ORDER — ACETAMINOPHEN 325 MG/1
650 TABLET ORAL EVERY 6 HOURS PRN
Status: DISCONTINUED | OUTPATIENT
Start: 2024-11-09 | End: 2024-11-14 | Stop reason: HOSPADM

## 2024-11-09 RX ORDER — MEMANTINE HYDROCHLORIDE 5 MG/1
5 TABLET ORAL 2 TIMES DAILY
Status: DISCONTINUED | OUTPATIENT
Start: 2024-11-09 | End: 2024-11-14 | Stop reason: HOSPADM

## 2024-11-09 RX ORDER — DEXTROSE MONOHYDRATE 100 MG/ML
INJECTION, SOLUTION INTRAVENOUS CONTINUOUS PRN
Status: CANCELLED | OUTPATIENT
Start: 2024-11-09

## 2024-11-09 RX ORDER — ISOSORBIDE MONONITRATE 60 MG/1
60 TABLET, EXTENDED RELEASE ORAL DAILY
Status: DISCONTINUED | OUTPATIENT
Start: 2024-11-09 | End: 2024-11-14 | Stop reason: HOSPADM

## 2024-11-09 RX ORDER — MIDODRINE HYDROCHLORIDE 2.5 MG/1
2.5 TABLET ORAL 3 TIMES DAILY
Status: DISCONTINUED | OUTPATIENT
Start: 2024-11-09 | End: 2024-11-14 | Stop reason: HOSPADM

## 2024-11-09 RX ORDER — CETIRIZINE HYDROCHLORIDE 10 MG/1
10 TABLET ORAL DAILY
Status: DISCONTINUED | OUTPATIENT
Start: 2024-11-09 | End: 2024-11-14 | Stop reason: HOSPADM

## 2024-11-09 RX ORDER — GLUCAGON 1 MG/ML
1 KIT INJECTION PRN
Status: CANCELLED | OUTPATIENT
Start: 2024-11-09

## 2024-11-09 RX ORDER — ASPIRIN 81 MG/1
81 TABLET ORAL DAILY
Status: DISCONTINUED | OUTPATIENT
Start: 2024-11-09 | End: 2024-11-14 | Stop reason: HOSPADM

## 2024-11-09 RX ORDER — GLUCAGON 1 MG/ML
1 KIT INJECTION PRN
Status: DISCONTINUED | OUTPATIENT
Start: 2024-11-09 | End: 2024-11-14 | Stop reason: HOSPADM

## 2024-11-09 RX ORDER — ACETAMINOPHEN 650 MG/1
650 SUPPOSITORY RECTAL EVERY 6 HOURS PRN
Status: DISCONTINUED | OUTPATIENT
Start: 2024-11-09 | End: 2024-11-14 | Stop reason: HOSPADM

## 2024-11-09 RX ORDER — POTASSIUM CHLORIDE 7.45 MG/ML
10 INJECTION INTRAVENOUS PRN
Status: DISCONTINUED | OUTPATIENT
Start: 2024-11-09 | End: 2024-11-14 | Stop reason: HOSPADM

## 2024-11-09 RX ORDER — DOCUSATE SODIUM 100 MG/1
100 CAPSULE, LIQUID FILLED ORAL 2 TIMES DAILY PRN
Status: DISCONTINUED | OUTPATIENT
Start: 2024-11-09 | End: 2024-11-14 | Stop reason: HOSPADM

## 2024-11-09 RX ORDER — INSULIN LISPRO 100 [IU]/ML
0-8 INJECTION, SOLUTION INTRAVENOUS; SUBCUTANEOUS
Status: DISCONTINUED | OUTPATIENT
Start: 2024-11-09 | End: 2024-11-14 | Stop reason: HOSPADM

## 2024-11-09 RX ORDER — ONDANSETRON 2 MG/ML
4 INJECTION INTRAMUSCULAR; INTRAVENOUS EVERY 6 HOURS PRN
Status: DISCONTINUED | OUTPATIENT
Start: 2024-11-09 | End: 2024-11-14 | Stop reason: HOSPADM

## 2024-11-09 RX ORDER — FAMOTIDINE 20 MG/1
20 TABLET, FILM COATED ORAL DAILY
Status: DISCONTINUED | OUTPATIENT
Start: 2024-11-09 | End: 2024-11-14 | Stop reason: HOSPADM

## 2024-11-09 RX ORDER — ATORVASTATIN CALCIUM 20 MG/1
20 TABLET, FILM COATED ORAL NIGHTLY
Status: DISCONTINUED | OUTPATIENT
Start: 2024-11-09 | End: 2024-11-14 | Stop reason: HOSPADM

## 2024-11-09 RX ORDER — POTASSIUM CHLORIDE 1500 MG/1
40 TABLET, EXTENDED RELEASE ORAL PRN
Status: DISCONTINUED | OUTPATIENT
Start: 2024-11-09 | End: 2024-11-14 | Stop reason: HOSPADM

## 2024-11-09 RX ORDER — DULOXETIN HYDROCHLORIDE 60 MG/1
60 CAPSULE, DELAYED RELEASE ORAL DAILY
Status: DISCONTINUED | OUTPATIENT
Start: 2024-11-09 | End: 2024-11-14 | Stop reason: HOSPADM

## 2024-11-09 RX ORDER — DEXTROSE MONOHYDRATE 100 MG/ML
INJECTION, SOLUTION INTRAVENOUS CONTINUOUS PRN
Status: DISCONTINUED | OUTPATIENT
Start: 2024-11-09 | End: 2024-11-14 | Stop reason: HOSPADM

## 2024-11-09 RX ORDER — ONDANSETRON 4 MG/1
4 TABLET, ORALLY DISINTEGRATING ORAL EVERY 8 HOURS PRN
Status: DISCONTINUED | OUTPATIENT
Start: 2024-11-09 | End: 2024-11-14 | Stop reason: HOSPADM

## 2024-11-09 RX ORDER — MONTELUKAST SODIUM 10 MG/1
10 TABLET ORAL NIGHTLY
Status: DISCONTINUED | OUTPATIENT
Start: 2024-11-09 | End: 2024-11-14 | Stop reason: HOSPADM

## 2024-11-09 RX ORDER — SODIUM CHLORIDE 9 MG/ML
INJECTION, SOLUTION INTRAVENOUS PRN
Status: DISCONTINUED | OUTPATIENT
Start: 2024-11-09 | End: 2024-11-14 | Stop reason: HOSPADM

## 2024-11-09 RX ORDER — MAGNESIUM SULFATE IN WATER 40 MG/ML
2000 INJECTION, SOLUTION INTRAVENOUS PRN
Status: DISCONTINUED | OUTPATIENT
Start: 2024-11-09 | End: 2024-11-14 | Stop reason: HOSPADM

## 2024-11-09 RX ORDER — TRANEXAMIC ACID 10 MG/ML
1000 INJECTION, SOLUTION INTRAVENOUS ONCE
Status: COMPLETED | OUTPATIENT
Start: 2024-11-09 | End: 2024-11-09

## 2024-11-09 RX ORDER — GABAPENTIN 300 MG/1
300 CAPSULE ORAL DAILY
Status: DISCONTINUED | OUTPATIENT
Start: 2024-11-09 | End: 2024-11-14 | Stop reason: HOSPADM

## 2024-11-09 RX ORDER — SODIUM CHLORIDE 0.9 % (FLUSH) 0.9 %
5-40 SYRINGE (ML) INJECTION PRN
Status: DISCONTINUED | OUTPATIENT
Start: 2024-11-09 | End: 2024-11-14 | Stop reason: HOSPADM

## 2024-11-09 RX ORDER — SODIUM CHLORIDE 0.9 % (FLUSH) 0.9 %
5-40 SYRINGE (ML) INJECTION EVERY 12 HOURS SCHEDULED
Status: DISCONTINUED | OUTPATIENT
Start: 2024-11-09 | End: 2024-11-14 | Stop reason: HOSPADM

## 2024-11-09 RX ORDER — CLOPIDOGREL BISULFATE 75 MG/1
75 TABLET ORAL DAILY
Status: DISCONTINUED | OUTPATIENT
Start: 2024-11-09 | End: 2024-11-14 | Stop reason: HOSPADM

## 2024-11-09 RX ORDER — ATORVASTATIN CALCIUM 20 MG/1
20 TABLET, FILM COATED ORAL NIGHTLY
Status: CANCELLED | OUTPATIENT
Start: 2024-11-09

## 2024-11-09 RX ORDER — POLYETHYLENE GLYCOL 3350 17 G/17G
17 POWDER, FOR SOLUTION ORAL DAILY PRN
Status: DISCONTINUED | OUTPATIENT
Start: 2024-11-09 | End: 2024-11-14 | Stop reason: HOSPADM

## 2024-11-09 RX ORDER — NITROGLYCERIN 0.4 MG/1
0.4 TABLET SUBLINGUAL PRN
Status: DISCONTINUED | OUTPATIENT
Start: 2024-11-09 | End: 2024-11-14 | Stop reason: HOSPADM

## 2024-11-09 RX ADMIN — DULOXETINE HYDROCHLORIDE 60 MG: 60 CAPSULE, DELAYED RELEASE ORAL at 09:12

## 2024-11-09 RX ADMIN — FAMOTIDINE 20 MG: 20 TABLET, FILM COATED ORAL at 09:11

## 2024-11-09 RX ADMIN — MIDODRINE HYDROCHLORIDE 2.5 MG: 2.5 TABLET ORAL at 18:04

## 2024-11-09 RX ADMIN — IOPAMIDOL 80 ML: 755 INJECTION, SOLUTION INTRAVENOUS at 00:03

## 2024-11-09 RX ADMIN — CETIRIZINE HYDROCHLORIDE 10 MG: 10 TABLET, FILM COATED ORAL at 09:19

## 2024-11-09 RX ADMIN — Medication 400 MG: at 09:12

## 2024-11-09 RX ADMIN — SODIUM CHLORIDE, PRESERVATIVE FREE 10 ML: 5 INJECTION INTRAVENOUS at 20:11

## 2024-11-09 RX ADMIN — GABAPENTIN 300 MG: 300 CAPSULE ORAL at 09:12

## 2024-11-09 RX ADMIN — LEVOTHYROXINE SODIUM 88 MCG: 0.09 TABLET ORAL at 09:11

## 2024-11-09 RX ADMIN — SODIUM CHLORIDE, PRESERVATIVE FREE 10 ML: 5 INJECTION INTRAVENOUS at 09:11

## 2024-11-09 RX ADMIN — INSULIN LISPRO 2 UNITS: 100 INJECTION, SOLUTION INTRAVENOUS; SUBCUTANEOUS at 20:11

## 2024-11-09 RX ADMIN — MONTELUKAST 10 MG: 10 TABLET, FILM COATED ORAL at 20:11

## 2024-11-09 RX ADMIN — INSULIN LISPRO 4 UNITS: 100 INJECTION, SOLUTION INTRAVENOUS; SUBCUTANEOUS at 18:00

## 2024-11-09 RX ADMIN — ATORVASTATIN CALCIUM 20 MG: 20 TABLET, FILM COATED ORAL at 20:11

## 2024-11-09 RX ADMIN — ISOSORBIDE MONONITRATE 60 MG: 60 TABLET, EXTENDED RELEASE ORAL at 09:12

## 2024-11-09 RX ADMIN — MEMANTINE 5 MG: 5 TABLET ORAL at 09:12

## 2024-11-09 RX ADMIN — TRANEXAMIC ACID 1000 MG: 10 INJECTION, SOLUTION INTRAVENOUS at 02:11

## 2024-11-09 RX ADMIN — MEMANTINE 5 MG: 5 TABLET ORAL at 20:11

## 2024-11-09 NOTE — ED PROVIDER NOTES
EMERGENCY MEDICINE DEPARTMENT ENCOUNTER      CHIEF COMPLAINT    Chief Complaint   Patient presents with    Altered Mental Status       HPI    Ina Cotter is a 74 y.o. female who presents with generalized acute confusion since the onset this morning from SNF. Pt has known dementia and her daughter Elicia and spouse accompanied her to the ED in [facility] for a stroke vs encephalopathy workup. However, due to an unfortunate situation in which their CT scanner was malfunctioned, a CT diversion was put into effect. Pt was at [facility] ED since around 2:30 pm, with verbal transfer acceptance to come to [facility] by Dr. Mendez, but for some reason due to transport difficulty, pt finally arrived to the ED around 10 pm or so. The duration has been constant since the onset. The generalized confusion may be associated with unknown cause. Pt's labs and EKG at [facility] were wnl. Specifically her UA did not show any signs of infection. No aggravating or alleviating factors.    REVIEW OF SYSTEMS    Cardiac: No chest pain or palpitations  Respiratory: No shortness of breath or new cough  General: No fevers   : No dysuria or hematuria  GI: No vomiting or diarrhea  See HPI for further details.  All other systems reviewed and are negative.    PAST MEDICAL OR SURGICAL HISTORY    Past Medical History:   Diagnosis Date    Hyperlipidemia     Hypertension     Hypothyroidism     Type II or unspecified type diabetes mellitus without mention of complication, not stated as uncontrolled     Diagnosed      Past Surgical History:   Procedure Laterality Date    CARDIOVASCULAR STRESS TEST  2013    WNL    CATARACT REMOVAL      bilat     SECTION  .    CHOLECYSTECTOMY  1980    NECK SURGERY  2011    VITRECTOMY  2009    Rt eye       CURRENT MEDICATIONS    Current Outpatient Rx   Medication Sig Dispense Refill    docusate sodium (COLACE) 100 MG capsule Take 1 capsule by mouth 2 times daily as needed for Constipation      Continuous

## 2024-11-09 NOTE — ED TRIAGE NOTES
Pt to ED via EMS from [facility] w/ report of AMS. EMS report that two days ago pt fell in the bathtub and hit her head. EMS report that pt has a hx of dementia, but family state that pt has been more altered since this fall. Pt oriented to name and birthdate, but does not know year or location. Pt obeys commands, but quickly closes eyes while performing tasks, awoken easily with verbal response. Call light in reach. Family at bedside.

## 2024-11-09 NOTE — PLAN OF CARE
Care plan reviewed with patient and patient verbalize understanding of the plan of care and contribute to goal setting.     Problem: Discharge Planning  Goal: Discharge to home or other facility with appropriate resources  11/9/2024 1459 by Kushal Starks, RN  Outcome: Not Progressing  11/9/2024 0557 by Alma Rodas, PAULETTE  Outcome: Progressing  Flowsheets (Taken 11/9/2024 0520)  Discharge to home or other facility with appropriate resources: Identify barriers to discharge with patient and caregiver

## 2024-11-09 NOTE — ED NOTES
Recliner chair and boxed lunch and gingerale provided to pt . Pt resting in cot at this time. Pt breathing easy and unlabored. Call light in reach.

## 2024-11-09 NOTE — PLAN OF CARE
Problem: Chronic Conditions and Co-morbidities  Goal: Patient's chronic conditions and co-morbidity symptoms are monitored and maintained or improved  Outcome: Progressing     Problem: Discharge Planning  Goal: Discharge to home or other facility with appropriate resources  Outcome: Progressing  Flowsheets (Taken 11/9/2024 4839)  Discharge to home or other facility with appropriate resources: Identify barriers to discharge with patient and caregiver     Problem: Safety - Adult  Goal: Free from fall injury  Outcome: Progressing  Note: Falling star program remains in place. Call light and personal belongings within reach. Frequent visual monitoring continues.  Toileting program inplace. Patient assisted in turning/repositioning at least once every 2 hours, and on a prn basis.       Problem: Skin/Tissue Integrity  Goal: Absence of new skin breakdown  Description: 1.  Monitor for areas of redness and/or skin breakdown  2.  Assess vascular access sites hourly  3.  Every 4-6 hours minimum:  Change oxygen saturation probe site  4.  Every 4-6 hours:  If on nasal continuous positive airway pressure, respiratory therapy assess nares and determine need for appliance change or resting period.  Outcome: Progressing  Note: Monitoring patient skin integrity for skin breakdown, turning and repositioning q2h per protocol. Patient demonstrates turning and repositioning self.

## 2024-11-09 NOTE — PROGRESS NOTES
[facility]  SPEECH THERAPY  STRZ CVICU 4B  Clinical Swallow Evaluation    Discharge Recommendations: Home Health, SNF, and Continue to Assess Pending Progress  DIET ORDER RECOMMENDATIONS AFTER EVALUATION: Regular and thin liquids  Strategies:  Set-up with Meals, Full Upright Position, Small Bite/Sip, Pulmonary Monitoring, Medications Crushed with Puree, Direct 1:1 Supervision, Alternate Solids and Liquids, Limit Distractions, and Monitor for Fatigue     SLP Individual Minutes  Time In: 0810  Time Out: 0818  Minutes: 8  Timed Code Treatment Minutes: 0 Minutes       Date: 2024  Patient Name: Ina Cotter      CSN: [id number]   : 1950  (74 y.o.)  Gender: female   Referring Physician:  Anderson Walker DO     Diagnosis: Subarachnoid bleed (HCC)    History of Present Illness/Injury: Patient is a 74-year-old female who presented to [facility] altered mental status.  She had a fall approximately 1 week ago in the bathtub. Since then increasing altered mental status although, she does have history of dementia.  She is a poor historian at baseline.  However her  states that she is improving and her baseline is approaching her normal mentation.  On admission CT head showed atypical infiltrating gyriform hemorrhage suggesting in the right frontal lobe posteriorly.  Tiny subarachnoid collection in a single gyrus of the right frontal parietal junction near the vertex.  Moderate atrophy and mild small vessel chronic ischemic disease.  CT spine showed no acute bony pathology.     Past Medical History:   Diagnosis Date    Abnormal ultrasound cardiogram     large global hypokinesis EF35-40%    Anxiety and depression     CKD (chronic kidney disease)     Dementia (HCC)     GERD (gastroesophageal reflux disease)     Hyperlipidemia     Hypertension     Hypothyroidism     Neuropathy     Type II or unspecified type diabetes mellitus without mention of complication, not stated as uncontrolled     Diagnosed  assessment + ACE given findings of subarachnoid bleed to aide in POC development    LONG TERM GOALS:  No LTGs established d/t short RYAN Cunningham MA, CCC-SLP [id number]

## 2024-11-09 NOTE — PROGRESS NOTES
[facility]  INPATIENT OCCUPATIONAL THERAPY  STRZ CVICU 4B  EVALUATION      Discharge Recommendations: Continue to assess pending progress, Subacute/Skilled Nursing Facility, 24 hour supervision or assist, Patient would benefit from continued therapy after discharge  Equipment Recommendations: No        Time In: 1323  Time Out: 1400  Timed Code Treatment Minutes: 27 Minutes  Minutes: 37          Date: 2024  Patient Name: Ina Cotter,   Gender: female      MRN: [id number]  : 1950  (74 y.o.)  Referring Practitioner: Anderson Walker DO  Diagnosis: Subarachnoid bleed (HCC)  Additional Pertinent Hx: Patient is a 74-year-old female who presented to [facility] altered mental status.  She had a fall approximately 1 week ago in the bathtub. Since then increasing altered mental status although, she does have history of dementia.  She is a poor historian at baseline.  However her  states that she is improving and her baseline is approaching her normal mentation.  On admission CT head showed atypical infiltrating gyriform hemorrhage suggesting in the right frontal lobe posteriorly.  Tiny subarachnoid collection in a single gyrus of the right frontal parietal junction near the vertex.  Moderate atrophy and mild small vessel chronic ischemic disease.  CT spine showed no acute bony pathology.    Restrictions/Precautions:  Restrictions/Precautions: Fall Risk, General Precautions    Subjective  Chart Reviewed: Yes, Orders, Progress Notes, History and Physical, Imaging  Patient assessed for rehabilitation services?: Yes  Family / Caregiver Present: Yes (2 daughters and )    Subjective: nurse Umair cansecoayed session. Pt met in bed sleeping and agreeable to therapy. Family present to assist with providing accurate info d/t pt confusion. pt Ox1.    Pain: pt reports no pain, no facial grimacing     Vitals:  WFL    Social/Functional History:  Lives With: Spouse  Type of Home: House  Home Layout: Two

## 2024-11-09 NOTE — PROGRESS NOTES
Pharmacy Renal Adjustment    Pharmacy renally adjusted the following medication(s) per P&T approved policy: Pepcid    Recent Labs     11/08/24  2304   BUN 22   CREATININE 0.9     Estimated Creatinine Clearance: 39 mL/min (based on SCr of 0.9 mg/dL).    Assessment:  Normal    Plan:   Decrease Pepcid from 40mg daily to 20mg daily    Please call pharmacy with any questions.    Ana Riley [facility]  11/9/2024 7:16 AM

## 2024-11-09 NOTE — PROGRESS NOTES
Patient arrived to unit from ER via stretcher. Patient transferred to ICU bed and placed on continuous ICU bedside monitor. Patient admitted for Subarachnoid bleed (HCC) [I60.9]  Altered mental status, unspecified altered mental status type [R41.82]. Vitals obtained. See flowsheets. Patient's IV access includes Left fore arm. Assessment completed by RIGOBERTO. Two nurse skin assessment completed by RIGOBERTO and Triston. See flowsheets for assessment details. Policies and procedures of ICU able to be explained to patient at this time. Family member(s)/representative(s) present at time of admission include Spouse. Patient rights explained to family member(s)/representatives and patient, as able. Patient/patient's family member(s)/representative(s) Declined to have physician notified of their admission. All questions posed by patient's family member(s)/representative(s) and patient answered at this time.

## 2024-11-09 NOTE — H&P
CRITICAL CARE H&P NOTE      Patient:  Ina Cotter    Unit/Bed:4B-06/006-A  YOB: 1950  MRN: [id number]   PCP: Yeny Peralta MD  Date of Admission: 11/8/2024  Chief Complaint:-Altered mental status    Assessment and Plan:    Subarachnoid hemorrhage right frontoparietal junction: Patient reportedly had a fall about 1 week ago in the bathtub since then became more altered.  Initially went to outside facility which did not have a CT machine but was transferred to [facility].  CT head showed tiny subarachnoid collection and a single gyrus of the right frontoparietal junction near the vertex.  Atypical infiltrating gyriform hemorrhage suggesting the right frontal lobe posteriorly.  Neurosurgery consulted: Recommended MRI brain without contrast, ordered  CT head in a.m. if stable may be transferred off floor.  Aspirin/Plavix held  Hypothyroidism: Continue Synthroid 88 mcg daily  Hx of coronary artery disease: Follows with [facility].  On aspirin Plavix.  Held aspirin/Plavix in the setting of subarachnoid  Hypertension: Continue Imdur  Neuropathy: Continue gabapentin and Cymbalta  Anxiety/depression: Continue Cymbalta  Hyperlipidemia: Continue Lipitor  GERD: Continue Pepcid  Chronic hypotension: Continue midodrine  Iron deficiency anemia: Continue iron 325 every other  Dementia:   Continue Namenda  Insulin-dependent T2DM: On insulin pump at home.  Will continue with insulin pump  If insulin pump cannot be used will start moderate dose sliding scale algorithm insulin.  Hypoglycemia protocol        INITIAL H AND P AND ICU COURSE:  Patient is a 74-year-old female who presented to [facility] altered mental status.  She had a fall approximately 1 week ago in the bathtub. Since then increasing altered mental status although, she does have history of dementia.  She is a poor historian at baseline.  However her  states that she is improving and her baseline is approaching her normal  mentation.  On admission CT head showed atypical infiltrating gyriform hemorrhage suggesting in the right frontal lobe posteriorly.  Tiny subarachnoid collection in a single gyrus of the right frontal parietal junction near the vertex.  Moderate atrophy and mild small vessel chronic ischemic disease.  CT spine showed no acute bony pathology.    Past Medical History: Dementia, diabetes, Anxiety/depression, CAD, HLD, HTN, GERD, hypothyroidism, neuropathy.  Family History: Mother: Emphysema, sister: Diabetes, HTN, HLD, brother: Heart disease, kidney disease, diabetes.  Social History: Never tobacco user, denies illicit drug use, drinks 1 strawberry daiquiri per year.    ROS   Patient denies any fevers, chills, nausea, vomiting, chest pain, shortness of breath, change in vision, headaches    Scheduled Meds:  Continuous Infusions:    PHYSICAL EXAMINATION:  T: 98.6.  P: 77. RR: 15. B/P: 148/170. O2 Sat: 100% on room air.   Body mass index is 21.8 kg/m².   GCS:   14  General:   Chronically ill-appearing elderly female.  HEENT:  normocephalic and atraumatic.  No scleral icterus. PERR  Neck: supple.  No Thyromegaly.  Lungs: clear to auscultation.  No retractions  Cardiac: RRR.  No JVD.  Abdomen: soft.  Nontender.  Extremities:  No clubbing, cyanosis, or edema x 4.    Vasculature: capillary refill < 3 seconds. Palpable dorsalis pedis pulses.  Skin:  warm and dry.  Psych:  Alert and oriented x3.  Affect appropriate  Lymph:  No supraclavicular adenopathy.  Neurologic:  No focal deficit. No seizures.    Data: (All radiographs, tracings, PFTs, and imaging are personally viewed and interpreted unless otherwise noted).   Sodium 134, potassium 3.9, chloride 96, bicarb 27, BUN 20, creatinine 0.9, glucose 220, calcium 9.4  WBC 4.3, hemoglobin 12.8, hematocrit 38, platelets 94  Telemetry shows normal sinus rhythm    Seen with multidisciplinary ICU team no.  Meets Continued ICU Level Care Criteria:    [x] Yes   [] No - Transfer Planned

## 2024-11-09 NOTE — CONSULTS
[place], [place]                                          NEUROSURGICAL CONSULTATION NOTE       Ina Cotter   YOB: 1950  Account Number: [id number]   Date of Examination: 11/9/2024    ASSESSMENT:    -This is a 74-year-old female with history of a ground-level fall about 1 week ago who developed worsening altered mental status (worsening confusion) patient underwent brain CT that showed suspected small acute subarachnoid hemorrhage.  -GCS: 13/15  -Focal neurological deficit: Confused but there is no new focal neurodeficit at this time.  -Patient has baseline dementia     PLAN:    -Medical management per ICU team and patient primary.  -Keep systolic blood pressure less than 160 and above 110.  -Coagulation profile.  -Stop any anticoagulation medication at this time.  -Seizure precaution.  -New brain CT tomorrow morning.  -Neurosurgery will follow.  - The case was discussed in detail with the ICU team, ER team and with the patient and her family.    HISTORY OF PRESENT ILLNESS:  Ina Cotter is a 74 y.o. female, admitted on :11/8/2024 10:31 PM  This is a 74-year-old female who has a history of head trauma about 1 week ago.  Patient presented to the ER for evaluation of worsening mental status.  Patient underwent a brain CT that showed suspected small traumatic subarachnoid hemorrhage.    ROS:    Review of Systems    I was not able to obtain recommended ROS because of patient altered mental status(confused).    PROBLEM LIST:  Patient Active Problem List   Diagnosis    Type 1 diabetes mellitus with complication (HCC)    Need for hepatitis B vaccination    Dyslipidemia    Subarachnoid bleed (HCC)       MEDICATIONS:   Prior to Admission medications    Medication Sig Start Date End Date Taking? Authorizing Provider   docusate sodium (COLACE) 100 MG capsule Take 1 capsule by mouth 2 times daily as needed for Constipation    Provider, MD Wiley   Continuous Glucose Sensor  Anderson Walker DO        acetaminophen (TYLENOL) tablet 650 mg  650 mg Oral Q6H PRN Anderson Walekr DO        Or    acetaminophen (TYLENOL) suppository 650 mg  650 mg Rectal Q6H PRN Anderson Walker DO        [Held by provider] aspirin EC tablet 81 mg  81 mg Oral Daily Anderson Walker DO        atorvastatin (LIPITOR) tablet 20 mg  20 mg Oral Nightly Anderson Walker DO        cetirizine (ZYRTEC) tablet 10 mg  10 mg Oral Daily Anderson Walker DO        [Held by provider] clopidogrel (PLAVIX) tablet 75 mg  75 mg Oral Daily Anderson Walker DO        docusate sodium (COLACE) capsule 100 mg  100 mg Oral BID PRN Anderson Walker DO        DULoxetine (CYMBALTA) extended release capsule 60 mg  60 mg Oral Daily Anderson Walker DO        famotidine (PEPCID) tablet 20 mg  20 mg Oral Daily Anderson Walker DO        gabapentin (NEURONTIN) capsule 300 mg  300 mg Oral Daily Anderson Walker,         isosorbide mononitrate (IMDUR) extended release tablet 60 mg  60 mg Oral Daily Anderson Walker DO        levothyroxine (SYNTHROID) tablet 88 mcg  88 mcg Oral Daily Anderson Walker DO        magnesium oxide (MAG-OX) tablet 400 mg  400 mg Oral Daily Anderson Walker DO        midodrine (PROAMATINE) tablet 2.5 mg  2.5 mg Oral TID Anderson Walekr DO        memantine (NAMENDA) tablet 5 mg  5 mg Oral BID Anderson Walker DO        montelukast (SINGULAIR) tablet 10 mg  10 mg Oral Nightly Anderson Walker DO        nitroGLYCERIN (NITROSTAT) SL tablet 0.4 mg  0.4 mg SubLINGual PRN Anderson Walker DO            sodium chloride flush, 5-40 mL, PRN  sodium chloride, , PRN  potassium chloride, 40 mEq, PRN   Or  potassium alternative oral replacement, 40 mEq, PRN   Or  potassium chloride, 10 mEq, PRN  magnesium sulfate, 2,000 mg, PRN  ondansetron, 4 mg, Q8H PRN   Or  ondansetron, 4 mg, Q6H PRN  polyethylene glycol, 17 g, Daily PRN  acetaminophen, 650 mg, Q6H PRN   Or  acetaminophen, 650 mg, Q6H PRN  docusate sodium,

## 2024-11-09 NOTE — ED NOTES
Pt resting in cot w/ family at bedside. Pt and family updated on POC. No further needs expressed at this time. Vitals collected. Pt breathing easy and unlabored. Call light in reach.

## 2024-11-10 ENCOUNTER — APPOINTMENT (OUTPATIENT)
Dept: CT IMAGING | Age: 74
DRG: 065 | End: 2024-11-10
Payer: MEDICARE

## 2024-11-10 LAB
ANION GAP SERPL CALC-SCNC: 15 MEQ/L (ref 8–16)
BUN SERPL-MCNC: 19 MG/DL (ref 7–22)
CALCIUM SERPL-MCNC: 9.2 MG/DL (ref 8.5–10.5)
CHLORIDE SERPL-SCNC: 91 MEQ/L (ref 98–111)
CO2 SERPL-SCNC: 24 MEQ/L (ref 23–33)
CREAT SERPL-MCNC: 0.9 MG/DL (ref 0.4–1.2)
DEPRECATED RDW RBC AUTO: 40.9 FL (ref 35–45)
ERYTHROCYTE [DISTWIDTH] IN BLOOD BY AUTOMATED COUNT: 12.2 % (ref 11.5–14.5)
GFR SERPL CREATININE-BSD FRML MDRD: 67 ML/MIN/1.73M2
GLUCOSE BLD STRIP.AUTO-MCNC: 132 MG/DL (ref 70–108)
GLUCOSE BLD STRIP.AUTO-MCNC: 158 MG/DL (ref 70–108)
GLUCOSE BLD STRIP.AUTO-MCNC: 170 MG/DL (ref 70–108)
GLUCOSE BLD STRIP.AUTO-MCNC: 192 MG/DL (ref 70–108)
GLUCOSE SERPL-MCNC: 188 MG/DL (ref 70–108)
HCT VFR BLD AUTO: 40 % (ref 37–47)
HGB BLD-MCNC: 13.4 GM/DL (ref 12–16)
MCH RBC QN AUTO: 30.5 PG (ref 26–33)
MCHC RBC AUTO-ENTMCNC: 33.5 GM/DL (ref 32.2–35.5)
MCV RBC AUTO: 91.1 FL (ref 81–99)
PLATELET # BLD AUTO: 113 THOU/MM3 (ref 130–400)
PMV BLD AUTO: 12.1 FL (ref 9.4–12.4)
POTASSIUM SERPL-SCNC: 3.9 MEQ/L (ref 3.5–5.2)
RBC # BLD AUTO: 4.39 MILL/MM3 (ref 4.2–5.4)
SODIUM SERPL-SCNC: 130 MEQ/L (ref 135–145)
T4 FREE SERPL-MCNC: 1.5 NG/DL (ref 0.93–1.68)
TSH SERPL DL<=0.005 MIU/L-ACNC: 5.44 UIU/ML (ref 0.4–4.2)
WBC # BLD AUTO: 5 THOU/MM3 (ref 4.8–10.8)

## 2024-11-10 PROCEDURE — 80048 BASIC METABOLIC PNL TOTAL CA: CPT

## 2024-11-10 PROCEDURE — 2060000000 HC ICU INTERMEDIATE R&B

## 2024-11-10 PROCEDURE — 84439 ASSAY OF FREE THYROXINE: CPT

## 2024-11-10 PROCEDURE — 82948 REAGENT STRIP/BLOOD GLUCOSE: CPT

## 2024-11-10 PROCEDURE — 97530 THERAPEUTIC ACTIVITIES: CPT

## 2024-11-10 PROCEDURE — 85027 COMPLETE CBC AUTOMATED: CPT

## 2024-11-10 PROCEDURE — 99233 SBSQ HOSP IP/OBS HIGH 50: CPT | Performed by: INTERNAL MEDICINE

## 2024-11-10 PROCEDURE — 84443 ASSAY THYROID STIM HORMONE: CPT

## 2024-11-10 PROCEDURE — 70450 CT HEAD/BRAIN W/O DYE: CPT

## 2024-11-10 PROCEDURE — 36415 COLL VENOUS BLD VENIPUNCTURE: CPT

## 2024-11-10 PROCEDURE — 6370000000 HC RX 637 (ALT 250 FOR IP)

## 2024-11-10 PROCEDURE — 2580000003 HC RX 258

## 2024-11-10 PROCEDURE — 97162 PT EVAL MOD COMPLEX 30 MIN: CPT

## 2024-11-10 RX ADMIN — FAMOTIDINE 20 MG: 20 TABLET, FILM COATED ORAL at 07:50

## 2024-11-10 RX ADMIN — Medication 400 MG: at 07:50

## 2024-11-10 RX ADMIN — MEMANTINE 5 MG: 5 TABLET ORAL at 07:50

## 2024-11-10 RX ADMIN — GABAPENTIN 300 MG: 300 CAPSULE ORAL at 09:03

## 2024-11-10 RX ADMIN — ATORVASTATIN CALCIUM 20 MG: 20 TABLET, FILM COATED ORAL at 20:03

## 2024-11-10 RX ADMIN — SODIUM CHLORIDE, PRESERVATIVE FREE 10 ML: 5 INJECTION INTRAVENOUS at 19:59

## 2024-11-10 RX ADMIN — SODIUM CHLORIDE, PRESERVATIVE FREE 10 ML: 5 INJECTION INTRAVENOUS at 07:51

## 2024-11-10 RX ADMIN — MIDODRINE HYDROCHLORIDE 2.5 MG: 2.5 TABLET ORAL at 06:51

## 2024-11-10 RX ADMIN — INSULIN LISPRO 2 UNITS: 100 INJECTION, SOLUTION INTRAVENOUS; SUBCUTANEOUS at 07:50

## 2024-11-10 RX ADMIN — MEMANTINE 5 MG: 5 TABLET ORAL at 20:03

## 2024-11-10 RX ADMIN — ISOSORBIDE MONONITRATE 60 MG: 60 TABLET, EXTENDED RELEASE ORAL at 07:50

## 2024-11-10 RX ADMIN — CETIRIZINE HYDROCHLORIDE 10 MG: 10 TABLET, FILM COATED ORAL at 07:50

## 2024-11-10 RX ADMIN — DULOXETINE HYDROCHLORIDE 60 MG: 60 CAPSULE, DELAYED RELEASE ORAL at 07:50

## 2024-11-10 RX ADMIN — MONTELUKAST 10 MG: 10 TABLET, FILM COATED ORAL at 20:03

## 2024-11-10 RX ADMIN — LEVOTHYROXINE SODIUM 88 MCG: 0.09 TABLET ORAL at 06:51

## 2024-11-10 NOTE — PLAN OF CARE
Problem: Chronic Conditions and Co-morbidities  Goal: Patient's chronic conditions and co-morbidity symptoms are monitored and maintained or improved  11/9/2024 2159 by Vane Leon, RN  Outcome: Progressing  Flowsheets (Taken 11/9/2024 2159)  Care Plan - Patient's Chronic Conditions and Co-Morbidity Symptoms are Monitored and Maintained or Improved:   Monitor and assess patient's chronic conditions and comorbid symptoms for stability, deterioration, or improvement   Update acute care plan with appropriate goals if chronic or comorbid symptoms are exacerbated and prevent overall improvement and discharge   Collaborate with multidisciplinary team to address chronic and comorbid conditions and prevent exacerbation or deterioration  11/9/2024 1459 by Kushal Starks RN  Outcome: Progressing     Problem: Discharge Planning  Goal: Discharge to home or other facility with appropriate resources  11/9/2024 2159 by Vane Leon, RN  Outcome: Progressing  Flowsheets (Taken 11/9/2024 0520 by Alma Rodas RN)  Discharge to home or other facility with appropriate resources: Identify barriers to discharge with patient and caregiver  11/9/2024 1459 by Kushal Starks RN  Outcome: Not Progressing     Problem: Safety - Adult  Goal: Free from fall injury  11/9/2024 2159 by Vane Leon, RN  Outcome: Progressing  Flowsheets (Taken 11/9/2024 2159)  Free From Fall Injury: Instruct family/caregiver on patient safety  11/9/2024 1459 by Kushal Starks, RN  Outcome: Progressing     Problem: Skin/Tissue Integrity  Goal: Absence of new skin breakdown  Description: 1.  Monitor for areas of redness and/or skin breakdown  2.  Assess vascular access sites hourly  3.  Every 4-6 hours minimum:  Change oxygen saturation probe site  4.  Every 4-6 hours:  If on nasal continuous positive airway pressure, respiratory therapy assess nares and determine need for appliance change or resting period.  11/9/2024 2159 by Edward

## 2024-11-10 NOTE — PROGRESS NOTES
CRITICAL CARE PROGRESS NOTE      Patient:  Ina Cotter    Unit/Bed:4B-06/006-A  YOB: 1950  MRN: [id number]   PCP: Yeny Peralta MD  Date of Admission: 11/8/2024  Chief Complaint:- Altered mental status    Assessment and Plan:    Subarachnoid hemorrhage right frontoparietal junction: Patient reportedly had a fall about 1 week ago in the bathtub since then became more altered.  Initially went to outside facility which did not have a CT machine but was transferred to [facility].    -CT head 11/8/24 showed tiny subarachnoid collection and a single gyrus of the right frontoparietal junction near the vertex.  Atypical infiltrating gyriform hemorrhage suggesting the right frontal lobe posteriorly.  - MRI head- Mild atrophy and probable ischemic changes in the white matter.There is susceptibility artifact in the right frontal parasagittal cortex,along the tentorium cerebelli on the right side, in the left frontal lobe and in the dependent portion of the right lateral ventricle consistent with hemorrhage.   -Repeat CT head 11/10/24:  Stable right sided intracranial hemorrhage. No resultant mass effect.  Chronic changes as described. Aspirin/Plavix held  -Neurosurgery following: Keep systolic blood pressure less than 160 and above 110. Plan for repeat Ct head in 1 week with follow up with PCP, neurosurgery referral as needed  Hypothyroidism: Continue Synthroid 88 mcg daily  Hx of coronary artery disease: Follows with [facility].  On aspirin Plavix.  Held aspirin/Plavix in the setting of subarachnoid  Hypertension: Continue Imdur  Neuropathy: Continue gabapentin and Cymbalta  Anxiety/depression: Continue Cymbalta  Hyperlipidemia: Continue Lipitor  GERD: Continue Pepcid  Chronic hypotension: Continue midodrine  Iron deficiency anemia: Continue iron 325 every other  Dementia:   Continue Namenda  Insulin-dependent T2DM: On insulin pump at home.  Will continue with insulin pump  If insulin        PHYSICAL EXAMINATION:  T:  98.4.  P:  68. RR:  16. B/P:  155/131.   O2 Sat:  99.  I/O:  +700  Body mass index is 21.8 kg/m².   GCS:   15  General:   Chronically ill-appearing elderly female.   HEENT:  normocephalic and atraumatic.  No scleral icterus. PERR  Neck: supple.  No Thyromegaly.  Lungs: clear to auscultation.  No retractions  Cardiac: RRR.  No JVD.  Abdomen: soft.  Nontender.  Extremities:  No clubbing, cyanosis, or edema x 4.    Vasculature: capillary refill < 3 seconds. Palpable dorsalis pedis pulses.  Skin:  warm and dry.  Psych:  Alert and oriented x3.  Affect appropriate  Lymph:  No supraclavicular adenopathy.  Neurologic:  No focal deficit. No seizures.    Data: (All radiographs, tracings, PFTs, and imaging are personally viewed and interpreted unless otherwise noted).   Sodium 130, K 3.9, HCO3-24, S cr 0.9, glucose-188  WBC 5, hb 13.4, plts 113  Telemetry shows NSR    CT head: 11/10/24  No intracranial mass, midline shift, or hydrocephalus. Chronic parenchymal   volume loss.  Stable small to moderate volume subarachnoid hemorrhage along the right   frontal lobe anteriorly and medially.  Nonspecific scattered white matter changes, most likely reflective of   chronic small vessel ischemic changes in patient of this age.     No significant mastoid effusion.  No air fluid levels in paranasal sinuses.  No acute fracture.     IMPRESSION:  Stable right sided intracranial hemorrhage. No resultant mass effect.  Chronic changes as described.      Seen with multidisciplinary ICU team .  Meets Continued ICU Level Care Criteria:    [] Yes   [] No - Transfer Planned to listed location: [facility]  [x] HOSPITALIST CONTACTED- DR March    Case and plan discussed with Dr. Botello  Electronically signed by Efe Johansen MD  CRITICAL CARE SPECIALIST     Addendum by Dr. Ayan MD:  Patient seen by me independently including key components of medical care. Face to face evaluation and examination was performed.

## 2024-11-10 NOTE — PLAN OF CARE
Problem: Chronic Conditions and Co-morbidities  Goal: Patient's chronic conditions and co-morbidity symptoms are monitored and maintained or improved  11/10/2024 0812 by Jaye Rossi RN  Outcome: Progressing  11/9/2024 2159 by Vane Leon RN  Outcome: Progressing  Flowsheets (Taken 11/9/2024 2159)  Care Plan - Patient's Chronic Conditions and Co-Morbidity Symptoms are Monitored and Maintained or Improved:   Monitor and assess patient's chronic conditions and comorbid symptoms for stability, deterioration, or improvement   Update acute care plan with appropriate goals if chronic or comorbid symptoms are exacerbated and prevent overall improvement and discharge   Collaborate with multidisciplinary team to address chronic and comorbid conditions and prevent exacerbation or deterioration     Problem: Discharge Planning  Goal: Discharge to home or other facility with appropriate resources  11/10/2024 0812 by Jaye Rossi RN  Outcome: Progressing  11/9/2024 2159 by Vane Leon RN  Outcome: Progressing  Flowsheets (Taken 11/9/2024 0520 by Alma Rodas RN)  Discharge to home or other facility with appropriate resources: Identify barriers to discharge with patient and caregiver     Problem: Safety - Adult  Goal: Free from fall injury  11/10/2024 0812 by Jaye Rossi RN  Outcome: Progressing  11/9/2024 2159 by Vane Leon RN  Outcome: Progressing  Flowsheets (Taken 11/9/2024 2159)  Free From Fall Injury: Instruct family/caregiver on patient safety     Problem: Skin/Tissue Integrity  Goal: Absence of new skin breakdown  Description: 1.  Monitor for areas of redness and/or skin breakdown  2.  Assess vascular access sites hourly  3.  Every 4-6 hours minimum:  Change oxygen saturation probe site  4.  Every 4-6 hours:  If on nasal continuous positive airway pressure, respiratory therapy assess nares and determine need for appliance change or resting period.  11/10/2024 0812 by

## 2024-11-10 NOTE — PLAN OF CARE
Name: Ina Cotter  YOB: 1950  MRN: [id number]  Date: 11/10/24     Plan of Care    I have seen and evaluated the patient on the day of transfer.  Hospitalist team will continue current management of stable subarachnoid hemorrhage by maintaing SBP goal between 110 and 160.  Will hold midodrine 2.5 TID.  /101 at 11/10 6 AM, hold parameters in place for midodrine but midodrine was still given.  Orthostatic vitals ordered.  Will consider abdominal binder.    Spoke with the .  Patient past medical history significant for dementia on Namenda.  Patient unable to perform ADLs independently, requires 's assistance.   expressed increased difficulty with performing ADLs per patient.  Will consult  for possible SNF/nursing home placement.     Electronically signed by Eric CAMPOS DO on 11/10/2024 at 11:05 AM

## 2024-11-10 NOTE — PROGRESS NOTES
[facility]  INPATIENT PHYSICAL THERAPY  EVALUATION  STRZ CVICU 4B - 4B-06/006-A    Discharge Recommendations: Subacute/Skilled Nursing Facility  Equipment Recommendations: No (defer to next level of care)           Time In: 1222  Time Out: 1250  Timed Code Treatment Minutes: 10 Minutes  Minutes: 28        Date: 11/10/2024  Patient Name: Ina Cotter,  Gender:  female        MRN: [id number]  : 1950  (74 y.o.)      Referring Practitioner: Anderson Walker DO  Diagnosis: Subarachnoid bleed (HCC)  Additional Pertinent Hx: Per EMR:Patient reportedly had a fall about 1 week ago in the bathtub since then became more altered.she does have history of dementia.  She is a poor historian at baseline.  Initially went to outside facility which did not have a CT machine but was transferred to [facility].    -CT head 24 showed tiny subarachnoid collection and a single gyrus of the right frontoparietal junction near the vertex.  Atypical infiltrating gyriform hemorrhage suggesting the right frontal lobe posteriorly.  - MRI head- Mild atrophy and probable ischemic changes in the white matter.There is susceptibility artifact in the right frontal parasagittal cortex,along the tentorium cerebelli on the right side, in the left frontal lobe and in the dependent portion of the right lateral ventricle consistent with hemorrhage.   -Repeat CT head 11/10/24:  Stable right sided intracranial hemorrhage. No resultant mass effect.Neurosurgery following: Keep systolic blood pressure less than 160 and above 110.     Restrictions/Precautions:  Restrictions/Precautions: Fall Risk, General Precautions, Seizure  Position Activity Restriction  Other position/activity restrictions: maintaing SBP goal between 110 and 160.    Subjective:  Chart Reviewed: Yes  Patient assessed for rehabilitation services?: Yes  Family / Caregiver Present: Yes (daughter and spouse)  Subjective: RN approved session, patient in  education and facilitation of tasks to increase safety and independence with functional mobility for improved independence and quality of life.    Assessment:  Body Structures, Functions, Activity Limitations Requiring Skilled Therapeutic Intervention: Decreased functional mobility , Decreased strength, Decreased balance, Decreased cognition, Decreased safe awareness  Assessment: Ina Cotter is a 74 y.o. female that presents with Subarachnoid bleed. Pt demonstrates a decrease in baseline by way of bed mobility, transfers and ambulation secondary to decreased activity tolerance, strength, fatigue, and balance deficits. Pt will benefit from skilled PT services throughout admission and beyond hospital discharge for improvements in functional mobility and in order to decrease fall risk and return pt to PLOF.     Therapy Prognosis: Good, Fair    Requires PT Follow-Up: Yes    Patient Education:      .    Patient Education  Education Given To: Patient, Family  Education Provided: Role of Therapy, Plan of Care  Education Method: Verbal  Barriers to Learning: Cognition  Education Outcome: Continued education needed       Plan:  Current Treatment Recommendations: Strengthening, Balance training, Functional mobility training, Transfer training, Gait training, Stair training, Safety education & training, Patient/Caregiver education & training, Therapeutic activities  General Plan:  (5xN)    Goals:  Patient Goals : Pt unable to state goal, but  stated he hopes to take patient home.  Short Term Goals  Time Frame for Short Term Goals: By discharge  Short Term Goal 1: Supine to/from sit at CGA in order to get in/out of bed.  Short Term Goal 2: Sit to/from stand at CGA of 1 in order to get up to walk.  Short Term Goal 3: Ambulate 25 feet with RW at Min A of 1 in order to walk in home safely.  Short Term Goal 4: Ascend/Descend 3 steps with 1 handrails at Min A in order to enter/exit home.  Long Term Goals  Time Frame for

## 2024-11-11 LAB
ANION GAP SERPL CALC-SCNC: 13 MEQ/L (ref 8–16)
BUN SERPL-MCNC: 22 MG/DL (ref 7–22)
CALCIUM SERPL-MCNC: 9.3 MG/DL (ref 8.5–10.5)
CHLORIDE SERPL-SCNC: 96 MEQ/L (ref 98–111)
CO2 SERPL-SCNC: 22 MEQ/L (ref 23–33)
CREAT SERPL-MCNC: 0.8 MG/DL (ref 0.4–1.2)
DEPRECATED MEAN GLUCOSE BLD GHB EST-ACNC: 213 MG/DL (ref 70–126)
DEPRECATED RDW RBC AUTO: 39.3 FL (ref 35–45)
ERYTHROCYTE [DISTWIDTH] IN BLOOD BY AUTOMATED COUNT: 12.1 % (ref 11.5–14.5)
GFR SERPL CREATININE-BSD FRML MDRD: 77 ML/MIN/1.73M2
GLUCOSE BLD STRIP.AUTO-MCNC: 148 MG/DL (ref 70–108)
GLUCOSE BLD STRIP.AUTO-MCNC: 151 MG/DL (ref 70–108)
GLUCOSE BLD STRIP.AUTO-MCNC: 160 MG/DL (ref 70–108)
GLUCOSE BLD STRIP.AUTO-MCNC: 224 MG/DL (ref 70–108)
GLUCOSE BLD STRIP.AUTO-MCNC: 226 MG/DL (ref 70–108)
GLUCOSE SERPL-MCNC: 244 MG/DL (ref 70–108)
HBA1C MFR BLD HPLC: 9.1 % (ref 4.4–6.4)
HCT VFR BLD AUTO: 38.9 % (ref 37–47)
HGB BLD-MCNC: 13.2 GM/DL (ref 12–16)
MCH RBC QN AUTO: 30.3 PG (ref 26–33)
MCHC RBC AUTO-ENTMCNC: 33.9 GM/DL (ref 32.2–35.5)
MCV RBC AUTO: 89.2 FL (ref 81–99)
PLATELET # BLD AUTO: 105 THOU/MM3 (ref 130–400)
PMV BLD AUTO: 11.8 FL (ref 9.4–12.4)
POTASSIUM SERPL-SCNC: 4.3 MEQ/L (ref 3.5–5.2)
RBC # BLD AUTO: 4.36 MILL/MM3 (ref 4.2–5.4)
SODIUM SERPL-SCNC: 131 MEQ/L (ref 135–145)
WBC # BLD AUTO: 4 THOU/MM3 (ref 4.8–10.8)

## 2024-11-11 PROCEDURE — APPSS30 APP SPLIT SHARED TIME 16-30 MINUTES: Performed by: PHYSICIAN ASSISTANT

## 2024-11-11 PROCEDURE — 82948 REAGENT STRIP/BLOOD GLUCOSE: CPT

## 2024-11-11 PROCEDURE — 97530 THERAPEUTIC ACTIVITIES: CPT

## 2024-11-11 PROCEDURE — 6370000000 HC RX 637 (ALT 250 FOR IP)

## 2024-11-11 PROCEDURE — 36415 COLL VENOUS BLD VENIPUNCTURE: CPT

## 2024-11-11 PROCEDURE — 80048 BASIC METABOLIC PNL TOTAL CA: CPT

## 2024-11-11 PROCEDURE — 2580000003 HC RX 258

## 2024-11-11 PROCEDURE — 99232 SBSQ HOSP IP/OBS MODERATE 35: CPT | Performed by: INTERNAL MEDICINE

## 2024-11-11 PROCEDURE — 83036 HEMOGLOBIN GLYCOSYLATED A1C: CPT

## 2024-11-11 PROCEDURE — 2060000000 HC ICU INTERMEDIATE R&B

## 2024-11-11 PROCEDURE — 85027 COMPLETE CBC AUTOMATED: CPT

## 2024-11-11 PROCEDURE — 99232 SBSQ HOSP IP/OBS MODERATE 35: CPT | Performed by: NEUROLOGICAL SURGERY

## 2024-11-11 RX ORDER — INSULIN GLARGINE 100 [IU]/ML
5 INJECTION, SOLUTION SUBCUTANEOUS DAILY
Status: DISCONTINUED | OUTPATIENT
Start: 2024-11-11 | End: 2024-11-14 | Stop reason: HOSPADM

## 2024-11-11 RX ADMIN — ATORVASTATIN CALCIUM 20 MG: 20 TABLET, FILM COATED ORAL at 19:52

## 2024-11-11 RX ADMIN — SODIUM CHLORIDE, PRESERVATIVE FREE 10 ML: 5 INJECTION INTRAVENOUS at 08:25

## 2024-11-11 RX ADMIN — ISOSORBIDE MONONITRATE 60 MG: 60 TABLET, EXTENDED RELEASE ORAL at 08:22

## 2024-11-11 RX ADMIN — GABAPENTIN 300 MG: 300 CAPSULE ORAL at 08:22

## 2024-11-11 RX ADMIN — DOCUSATE SODIUM 100 MG: 100 CAPSULE, LIQUID FILLED ORAL at 06:11

## 2024-11-11 RX ADMIN — MEMANTINE 5 MG: 5 TABLET ORAL at 19:52

## 2024-11-11 RX ADMIN — INSULIN LISPRO 2 UNITS: 100 INJECTION, SOLUTION INTRAVENOUS; SUBCUTANEOUS at 08:17

## 2024-11-11 RX ADMIN — INSULIN LISPRO 2 UNITS: 100 INJECTION, SOLUTION INTRAVENOUS; SUBCUTANEOUS at 16:56

## 2024-11-11 RX ADMIN — INSULIN GLARGINE 5 UNITS: 100 INJECTION, SOLUTION SUBCUTANEOUS at 11:18

## 2024-11-11 RX ADMIN — LEVOTHYROXINE SODIUM 88 MCG: 0.09 TABLET ORAL at 05:35

## 2024-11-11 RX ADMIN — CETIRIZINE HYDROCHLORIDE 10 MG: 10 TABLET, FILM COATED ORAL at 08:22

## 2024-11-11 RX ADMIN — Medication 400 MG: at 08:22

## 2024-11-11 RX ADMIN — POLYETHYLENE GLYCOL 3350 17 G: 17 POWDER, FOR SOLUTION ORAL at 06:12

## 2024-11-11 RX ADMIN — SODIUM CHLORIDE, PRESERVATIVE FREE 10 ML: 5 INJECTION INTRAVENOUS at 19:51

## 2024-11-11 RX ADMIN — MONTELUKAST 10 MG: 10 TABLET, FILM COATED ORAL at 19:52

## 2024-11-11 RX ADMIN — FAMOTIDINE 20 MG: 20 TABLET, FILM COATED ORAL at 08:17

## 2024-11-11 RX ADMIN — MEMANTINE 5 MG: 5 TABLET ORAL at 08:22

## 2024-11-11 RX ADMIN — DULOXETINE HYDROCHLORIDE 60 MG: 60 CAPSULE, DELAYED RELEASE ORAL at 08:22

## 2024-11-11 NOTE — PROGRESS NOTES
[facility]  STRZ CVICU 4B  Occupational Therapy  Daily Note    Discharge Recommendations: Subacute/skilled nursing facility  Equipment Recommendations: No        Time In: 1125  Time Out: 1153  Timed Code Treatment Minutes: 28 Minutes  Minutes: 28          Date: 2024  Patient Name: Ina Cotter,   Gender: female      Room: 4B-06/006-A  MRN: [id number]  : 1950  (74 y.o.)  Referring Practitioner: Anderson Walker DO  Diagnosis: Subarachnoid bleed (HCC)  Additional Pertinent Hx: Patient is a 74-year-old female who presented to [facility] altered mental status.  She had a fall approximately 1 week ago in the bathtub. Since then increasing altered mental status although, she does have history of dementia.  She is a poor historian at baseline.  However her  states that she is improving and her baseline is approaching her normal mentation.  On admission CT head showed atypical infiltrating gyriform hemorrhage suggesting in the right frontal lobe posteriorly.  Tiny subarachnoid collection in a single gyrus of the right frontal parietal junction near the vertex.  Moderate atrophy and mild small vessel chronic ischemic disease.  CT spine showed no acute bony pathology.    Restrictions/Precautions:  Restrictions/Precautions: Fall Risk, General Precautions, Seizure  Position Activity Restriction  Other position/activity restrictions: maintaing SBP goal between 110 and 160.     Social/Functional History:  Lives With: Spouse  Type of Home: House  Home Layout: Two level, Able to Live on Main level with bedroom/bathroom  Home Access: Stairs to enter without rails  Entrance Stairs - Number of Steps: 3 CARLY with one small grab bar in the back door  Home Equipment: Cane, Walker - Rolling, Rollator   Bathroom Shower/Tub: Tub/Shower unit  Bathroom Toilet: Standard  Bathroom Equipment: Grab bars in shower, Tub transfer bench, 3-in-1 commode    Receives Help From: Family  ADL Assistance: Needs assistance

## 2024-11-11 NOTE — CARE COORDINATION
DISCHARGE PLANNING EVALUATION  11/11/24, 10:08 AM EST    Reason for Referral: SNF placement  Decision Maker: Spouse  Current Services: Passport services (emergency response button, home delivered meals)   New Services Requested: SNF placement  Family/ Social/ Home environment: Lives at home with spouse, Nilton. Has supportive daughters Cj and Zee  Payment Source: [employer] Medicare and traditional Medicaid  Transportation at Discharge: Ambulance due to dementia  Post-acute (PAC) provider list was provided to patient. Patient was informed of their freedom to choose PAC provider. Discussed and offered to show the patient the relevant PAC Providers quality and resource use measures on Medicare Compare web site via computer based on patient's goals of care and treatment preferences. Questions regarding selection process were answered.      Teach Back Method used with Nilton and Cj regarding care plan and discharge planning  Patient's family verbalized understanding of the plan of care and contribute to goal setting.       Patient preferences and discharge plan: Discharge plan pending progress, however family has requested SNF placement at [facility]. SW will make referral. Explained the referral process, as well as precert being required. Nilton states that her Passport CM is Katia Noriega and they have already made her aware that patient is admitted and denied wanting SW to call her.      Electronically signed by DEANN Massey on 11/11/2024 at 10:08 AM      10:19 AM- Made referral to Adriana with [facility] for [facility].

## 2024-11-11 NOTE — PLAN OF CARE
Problem: Discharge Planning  Goal: Discharge to home or other facility with appropriate resources  11/11/2024 0957 by Yeny Menendez LSW  Outcome: Progressing       Consult received. Please see SW note dated 11/11.

## 2024-11-11 NOTE — PROGRESS NOTES
Hospitalist Progress Note  Internal Medicine Resident      Patient: Ina Cotter 74 y.o. female      Unit/Bed: 4B-06/006-A    Admit Date: 11/8/2024      ASSESSMENT AND PLAN  Active Problems  Subarachnoid hemorrhage right frontoparietal junction.  S/p fall in bathtub approximately 1 week prior to admission; worsening mentation since.  CT head 11/8 showed tiny subarachnoid collection in right frontoparietal junction.  Atypical infiltrating gyriform hemorrhage suggesting the right frontal lobe posteriorly.  MRI head- Mild atrophy and probable ischemic changes in the white matter.There is susceptibility artifact in the right frontal parasagittal cortex,along the tentorium cerebelli on the right side, in the left frontal lobe and in the dependent portion of the right lateral ventricle consistent with hemorrhage.   Repeat CT head 11/10/24:  Stable right sided intracranial hemorrhage. No resultant mass effect.  Chronic changes as described. Aspirin/Plavix held  Neurosurgery following: Keep systolic blood pressure less than 160 and above 110. Plan for repeat Ct head in 1 week with follow up with PCP, neurosurgery referral as needed  Coronary artery disease: Follows at [facility].  On aspirin Plavix.    Currently on hold, per neurosurgery.  Insulin-dependent T2DM: On insulin pump at home.  Follows with [facility] internal medicine diabetes clinic.  Last A1c 9.1 11/2024  Do not use home insulin pump.  In case insulin pump cannot be used:  5 unit Lantus daily  Medium dose SSI with HGP, POC BG  Dementia.  Patient with cognitive decline, becoming difficult for her  to care for her.  Continue memantine.  Social work consulted.  Hyponatremia.  Sodium in low 130s consistently this admission.  Serum and urine osmolality, creatinine, sodium timed with morning BMP.    Resolved Problems    Chronic Conditions (reviewed and stable unless otherwise stated)  Hypothyroidism: Continue Synthroid 88 mcg daily  Hypertension: Continue  Imdur  Neuropathy: Continue gabapentin and Cymbalta  Anxiety/depression: Continue Cymbalta  Hyperlipidemia: Continue Lipitor  GERD: Continue Pepcid  Chronic hypotension: Continue midodrine  Iron deficiency anemia: Continue iron 325 every other      LDA: []CVC / []PICC / []Midline / []Goldman / []Drains / []Mediport / [x]None  Antibiotics: None  Steroids: None  Labs (still needed?): [x]Yes / []No  IVF (still needed?): []Yes / [x]No    Level of care: [x]Step Down / []Med-Surg  Bed Status: [x]Inpatient / []Observation  Telemetry: [x]Yes / []No  PT/OT: [x]Yes / []No    DVT Prophylaxis: [] Lovenox / [] Heparin / [x] SCDs / [] Already on Systemic Anticoagulation / [x] None     Expected discharge date: 1-2 days  Disposition: Home versus SNF, social work consulted.     Code status: Limited     ===================================================================    Chief Complaint: Altered mental status  Subjective (past 24 hours): Patient's mental status is approaching her baseline.  She is much more coherent, able to answer complex questions with complete sentences.   notes she is approximately at her baseline.  This is a good improvement since day prior.  She does well, has no complaints.  She has no focal deficits.  Neurosurgery continues to hold her aspirin, Plavix for CAD.    HPI / Hospital Course:  Patient is a 74-year-old female who presented to [facility] altered mental status. She had a fall approximately 1 week ago in the bathtub. Since then increasing altered mental status although, she does have history of dementia. She is a poor historian at baseline. However her  states that she is improving and her baseline is approaching her normal mentation. On admission CT head showed atypical infiltrating gyriform hemorrhage suggesting in the right frontal lobe posteriorly. Tiny subarachnoid collection in a single gyrus of the right frontal parietal junction near the vertex. Moderate atrophy and mild small vessel

## 2024-11-11 NOTE — CARE COORDINATION
Case Management Assessment Initial Evaluation    Date/Time of Evaluation: 2024 8:54 AM  Assessment Completed by: Yumiko Yeager RN    If patient is discharged prior to next notation, then this note serves as note for discharge by case management.    Patient Name: Ina Cotter                   YOB: 1950  Diagnosis: Subarachnoid bleed (HCC) [I60.9]  Altered mental status, unspecified altered mental status type [R41.82]                   Date / Time: 2024 10:31 PM  Location: [address] [place] [address]     Patient Admission Status: Inpatient   Readmission Risk Low 0-14, Mod 15-19), High > 20: Readmission Risk Score: 12.2    Current PCP: Yeny Peralta MD  Health Care Decision Makers:   Primary Decision Maker: Nilton Cotter - Spouse - [phone]    Additional Case Management Notes: Presented to ED with increasing AMS. Pt had reportedly fell in bathtub 1 week PTA. Was on asa/plavix. Found to have tiny right frontal SAH. Neurosurgery consulted. No plan for surgery. NIH 5: LOC , LOC questions, limb ataxia, and sensory. Oriented to self only (does have dementia at baseline). Afebrile. NSR. On room air. SLP/PT/OT. Telemetry, neuro checks, SCDs. Lantus, SSI, Electrolyte replacement protocols. Na+ 131, hgbA1C 9.1, wbc 4, plt 105.     Procedures: none    Imagin/9 CXR: No acute findings   CT Head: 1. Atypical infiltrating gyriform hemorrhage suggested in the right   frontal lobe posteriorly. If MRI if not contraindicated, consider follow   up MRI with DWI and gradient echo sequences.  2. Tiny subarachnoid collection in a single gyrus of the right   frontoparietal junction near the vertex.  3. Moderate atrophy and mild small-vessel chronic ischemic disease.   CT Cervical spine: No acute bony pathology. See above for chronic postoperative and   degenerative changes    CTA Head/Neck: Vague hyperemia in medial posterior right frontal lobe tissues. This may be post traumatic or post ischemic

## 2024-11-11 NOTE — PROGRESS NOTES
Neurosurgery Progress Note    Patient:  Ina Cotter      Unit/Bed:-06/006-A    YOB: 1950    MRN: [id number]     Acct: [id number]     Admit date: 11/8/2024    Chief Complaint   Patient presents with    Altered Mental Status       Patient Seen, Chart, Physician notes, Labs, Radiology studies reviewed.    Subjective: Patient is seen and evaluated on 4B with evaluation and exam findings reviewed and discussed with Dr. Hays/neurosurgeon and with nursing and with spouse.  Patient was resting comfortably as she remains stable and intact to her baseline with no additional or acute changes noted overnight.  Pain is well-controlled        Past, Family, Social History unchanged from admission.    Diet:  ADULT DIET; Regular    Medications:  Scheduled Meds:   sodium chloride flush  5-40 mL IntraVENous 2 times per day    [Held by provider] aspirin EC  81 mg Oral Daily    atorvastatin  20 mg Oral Nightly    cetirizine  10 mg Oral Daily    [Held by provider] clopidogrel  75 mg Oral Daily    DULoxetine  60 mg Oral Daily    famotidine  20 mg Oral Daily    gabapentin  300 mg Oral Daily    isosorbide mononitrate  60 mg Oral Daily    levothyroxine  88 mcg Oral Daily    magnesium oxide  400 mg Oral Daily    [Held by provider] midodrine  2.5 mg Oral TID    memantine  5 mg Oral BID    montelukast  10 mg Oral Nightly    insulin lispro  0-8 Units SubCUTAneous 4x Daily AC & HS     Continuous Infusions:   sodium chloride      dextrose       PRN Meds:sodium chloride flush, sodium chloride, potassium chloride **OR** potassium alternative oral replacement **OR** potassium chloride, magnesium sulfate, ondansetron **OR** ondansetron, polyethylene glycol, acetaminophen **OR** acetaminophen, docusate sodium, nitroGLYCERIN, glucose, dextrose bolus **OR** dextrose bolus, glucagon (rDNA), dextrose    Objective: Patient was observed lying in bed with head of bed appropriately elevated and pain well-controlled.  Patient demonstrated

## 2024-11-11 NOTE — PROGRESS NOTES
[facility]  INPATIENT PHYSICAL THERAPY  DAILY NOTE  STRZ CVICU 4B - 4B-06/006-A      Discharge Recommendations: Subacte/Skilled Nursing Facility  Equipment Recommendations: No (defer to next level of care)           Time In: 1215  Time Out: 1233  Timed Code Treatment Minutes: 18 Minutes  Minutes: 18        Date: 2024  Patient Name: Ina Cotter,  Gender:  female        MRN: [id number]  : 1950  (74 y.o.)     Referring Practitioner: Anderson Walker DO  Diagnosis: Subarachnoid bleed (HCC)  Additional Pertinent Hx: Per EMR:Patient reportedly had a fall about 1 week ago in the bathtub since then became more altered.she does have history of dementia.  She is a poor historian at baseline.  Initially went to outside facility which did not have a CT machine but was transferred to [facility].    -CT head 24 showed tiny subarachnoid collection and a single gyrus of the right frontoparietal junction near the vertex.  Atypical infiltrating gyriform hemorrhage suggesting the right frontal lobe posteriorly.  - MRI head- Mild atrophy and probable ischemic changes in the white matter.There is susceptibility artifact in the right frontal parasagittal cortex,along the tentorium cerebelli on the right side, in the left frontal lobe and in the dependent portion of the right lateral ventricle consistent with hemorrhage.   -Repeat CT head 11/10/24:  Stable right sided intracranial hemorrhage. No resultant mass effect.Neurosurgery following: Keep systolic blood pressure less than 160 and above 110.     Prior Level of Function:  Lives With: Spouse  Type of Home: House  Home Layout: Two level, Able to Live on Main level with bedroom/bathroom  Home Access: Stairs to enter without rails  Entrance Stairs - Number of Steps: 3 CARLY with one small grab bar in the back door  Home Equipment: Cane, Walker - Rolling, Rollator   Bathroom Shower/Tub: Tub/Shower unit  Bathroom Toilet:  Walker  Gait Deviations: Slowed juan with decreased motor planning and initiation of task. Needed manual assist to guide walker due to poor command following.     Stairs:  Not Tested    Balance:  Static Standing Balance: Minimal Assistance, X 1, and CGA of another while completing pericare after a bowel movement. Assist due to posterior lean.   Dynamic Standing Balance: Minimal Assistance, X 2, with RW, posterior lean    Exercise:  None    Functional Outcome Measures:  [facility] (6 CLICK) BASIC MOBILITY  AM-PAC Inpatient Mobility Raw Score : 9  AM-PAC Inpatient T-Scale Score : 30.55        Modified [place] Scale:  Not Applicable    ASSESSMENT:  Assessment: Patient progressing toward established goals.  Activity Tolerance:  Patient tolerance of  treatment:Fair.  Plan: Current Treatment Recommendations: Strengthening, Balance training, Functional mobility training, Transfer training, Gait training, Stair training, Safety education & training, Patient/Caregiver education & training, Therapeutic activities  General Plan:  (5xN)    Education:  Learners: Patient and Family  Patient Education: Transfers, Gait    Goals:  Patient Goals : Pt unable to state goal, but  stated he hopes to take patient home.  Short Term Goals  Time Frame for Short Term Goals: By discharge  Short Term Goal 1: Supine to/from sit at CGA in order to get in/out of bed.  Short Term Goal 2: Sit to/from stand at CGA of 1 in order to get up to walk.  Short Term Goal 3: Ambulate 25 feet with RW at Min A of 1 in order to walk in home safely.  Short Term Goal 4: Ascend/Descend 3 steps with 1 handrails at Min A in order to enter/exit home.  Long Term Goals  Time Frame for Long Term Goals : NA due to short ELOS    Following session, patient left in safe position with all fall risk precautions in place.

## 2024-11-12 PROBLEM — F02.B0 MODERATE LATE ONSET ALZHEIMER'S DEMENTIA (HCC): Status: ACTIVE | Noted: 2024-11-12

## 2024-11-12 PROBLEM — G30.1 MODERATE LATE ONSET ALZHEIMER'S DEMENTIA (HCC): Status: ACTIVE | Noted: 2024-11-12

## 2024-11-12 LAB
ANION GAP SERPL CALC-SCNC: 10 MEQ/L (ref 8–16)
ANION GAP SERPL CALC-SCNC: 12 MEQ/L (ref 8–16)
BACTERIA: ABNORMAL
BILIRUB UR QL STRIP: NEGATIVE
BUN SERPL-MCNC: 16 MG/DL (ref 7–22)
BUN SERPL-MCNC: 17 MG/DL (ref 7–22)
CALCIUM SERPL-MCNC: 8.7 MG/DL (ref 8.5–10.5)
CALCIUM SERPL-MCNC: 9.1 MG/DL (ref 8.5–10.5)
CASTS #/AREA URNS LPF: ABNORMAL /LPF
CASTS #/AREA URNS LPF: ABNORMAL /LPF
CHARACTER UR: CLEAR
CHARCOAL URNS QL MICRO: ABNORMAL
CHLORIDE SERPL-SCNC: 93 MEQ/L (ref 98–111)
CHLORIDE SERPL-SCNC: 94 MEQ/L (ref 98–111)
CO2 SERPL-SCNC: 24 MEQ/L (ref 23–33)
CO2 SERPL-SCNC: 26 MEQ/L (ref 23–33)
COLOR UR: YELLOW
CREAT SERPL-MCNC: 0.8 MG/DL (ref 0.4–1.2)
CREAT SERPL-MCNC: 1 MG/DL (ref 0.4–1.2)
CREAT UR-MCNC: 79 MG/DL
CRYSTALS URNS QL MICRO: ABNORMAL
EPITHELIAL CELLS, UA: ABNORMAL /HPF
GFR SERPL CREATININE-BSD FRML MDRD: 59 ML/MIN/1.73M2
GFR SERPL CREATININE-BSD FRML MDRD: 77 ML/MIN/1.73M2
GLUCOSE BLD STRIP.AUTO-MCNC: 164 MG/DL (ref 70–108)
GLUCOSE BLD STRIP.AUTO-MCNC: 175 MG/DL (ref 70–108)
GLUCOSE BLD STRIP.AUTO-MCNC: 190 MG/DL (ref 70–108)
GLUCOSE BLD STRIP.AUTO-MCNC: 213 MG/DL (ref 70–108)
GLUCOSE SERPL-MCNC: 185 MG/DL (ref 70–108)
GLUCOSE SERPL-MCNC: 196 MG/DL (ref 70–108)
GLUCOSE UR QL STRIP.AUTO: 500 MG/DL
HGB UR QL STRIP.AUTO: NEGATIVE
KETONES UR QL STRIP.AUTO: 15
LEUKOCYTE ESTERASE UR QL STRIP.AUTO: NEGATIVE
NITRITE UR QL STRIP.AUTO: NEGATIVE
ORIGINAL SAMPLE NUMBER: NORMAL
ORIGINAL SAMPLE NUMBER: NORMAL
OSMOLALITY SERPL: NORMAL MOSMOL/KG (ref 275–295)
OSMOLALITY UR: NORMAL MOSMOL/KG (ref 250–750)
PH UR STRIP.AUTO: 5.5 [PH] (ref 5–9)
POTASSIUM SERPL-SCNC: 4.1 MEQ/L (ref 3.5–5.2)
POTASSIUM SERPL-SCNC: 4.1 MEQ/L (ref 3.5–5.2)
PROT UR STRIP.AUTO-MCNC: ABNORMAL MG/DL
RBC #/AREA URNS HPF: ABNORMAL /HPF
REFERENCE LOCATION: NORMAL
REFERENCE LOCATION: NORMAL
REFERENCE RANGE: NORMAL
REFERENCE RANGE: NORMAL
RENAL EPI CELLS #/AREA URNS HPF: ABNORMAL /[HPF]
SODIUM SERPL-SCNC: 129 MEQ/L (ref 135–145)
SODIUM SERPL-SCNC: 130 MEQ/L (ref 135–145)
SODIUM UR-SCNC: < 20 MEQ/L
SPECIFIC GRAVITY UA: 1.02 (ref 1–1.03)
TEST RESULTS WITH UNITS: NORMAL
TEST RESULTS WITH UNITS: NORMAL
TEST(S) BEING PERFORMED: NORMAL
TEST(S) BEING PERFORMED: NORMAL
UROBILINOGEN, URINE: 1 EU/DL (ref 0–1)
WBC #/AREA URNS HPF: ABNORMAL /HPF
YEAST LIKE FUNGI URNS QL MICRO: ABNORMAL

## 2024-11-12 PROCEDURE — 81001 URINALYSIS AUTO W/SCOPE: CPT

## 2024-11-12 PROCEDURE — 6360000002 HC RX W HCPCS

## 2024-11-12 PROCEDURE — 6370000000 HC RX 637 (ALT 250 FOR IP)

## 2024-11-12 PROCEDURE — 99232 SBSQ HOSP IP/OBS MODERATE 35: CPT | Performed by: INTERNAL MEDICINE

## 2024-11-12 PROCEDURE — 84681 ASSAY OF C-PEPTIDE: CPT

## 2024-11-12 PROCEDURE — 2580000003 HC RX 258

## 2024-11-12 PROCEDURE — 82948 REAGENT STRIP/BLOOD GLUCOSE: CPT

## 2024-11-12 PROCEDURE — 83930 ASSAY OF BLOOD OSMOLALITY: CPT

## 2024-11-12 PROCEDURE — 83935 ASSAY OF URINE OSMOLALITY: CPT

## 2024-11-12 PROCEDURE — 97530 THERAPEUTIC ACTIVITIES: CPT

## 2024-11-12 PROCEDURE — 80048 BASIC METABOLIC PNL TOTAL CA: CPT

## 2024-11-12 PROCEDURE — 1200000000 HC SEMI PRIVATE

## 2024-11-12 PROCEDURE — 36415 COLL VENOUS BLD VENIPUNCTURE: CPT

## 2024-11-12 PROCEDURE — 97110 THERAPEUTIC EXERCISES: CPT

## 2024-11-12 PROCEDURE — 84300 ASSAY OF URINE SODIUM: CPT

## 2024-11-12 PROCEDURE — 82570 ASSAY OF URINE CREATININE: CPT

## 2024-11-12 PROCEDURE — 97535 SELF CARE MNGMENT TRAINING: CPT

## 2024-11-12 RX ORDER — ENOXAPARIN SODIUM 100 MG/ML
30 INJECTION SUBCUTANEOUS DAILY
Status: DISCONTINUED | OUTPATIENT
Start: 2024-11-12 | End: 2024-11-14 | Stop reason: HOSPADM

## 2024-11-12 RX ORDER — HYDRALAZINE HYDROCHLORIDE 25 MG/1
25 TABLET, FILM COATED ORAL ONCE
Status: DISCONTINUED | OUTPATIENT
Start: 2024-11-12 | End: 2024-11-12

## 2024-11-12 RX ORDER — 0.9 % SODIUM CHLORIDE 0.9 %
500 INTRAVENOUS SOLUTION INTRAVENOUS ONCE
Status: COMPLETED | OUTPATIENT
Start: 2024-11-12 | End: 2024-11-12

## 2024-11-12 RX ORDER — FERROUS SULFATE 325(65) MG
325 TABLET ORAL 2 TIMES DAILY WITH MEALS
Status: DISCONTINUED | OUTPATIENT
Start: 2024-11-12 | End: 2024-11-14 | Stop reason: HOSPADM

## 2024-11-12 RX ADMIN — MEMANTINE 5 MG: 5 TABLET ORAL at 08:37

## 2024-11-12 RX ADMIN — SODIUM CHLORIDE 500 ML: 9 INJECTION, SOLUTION INTRAVENOUS at 11:52

## 2024-11-12 RX ADMIN — MONTELUKAST 10 MG: 10 TABLET, FILM COATED ORAL at 19:32

## 2024-11-12 RX ADMIN — FAMOTIDINE 20 MG: 20 TABLET, FILM COATED ORAL at 08:37

## 2024-11-12 RX ADMIN — CETIRIZINE HYDROCHLORIDE 10 MG: 10 TABLET, FILM COATED ORAL at 08:37

## 2024-11-12 RX ADMIN — GABAPENTIN 300 MG: 300 CAPSULE ORAL at 08:37

## 2024-11-12 RX ADMIN — LEVOTHYROXINE SODIUM 88 MCG: 0.09 TABLET ORAL at 06:12

## 2024-11-12 RX ADMIN — ISOSORBIDE MONONITRATE 60 MG: 60 TABLET, EXTENDED RELEASE ORAL at 08:37

## 2024-11-12 RX ADMIN — DULOXETINE HYDROCHLORIDE 60 MG: 60 CAPSULE, DELAYED RELEASE ORAL at 08:37

## 2024-11-12 RX ADMIN — MEMANTINE 5 MG: 5 TABLET ORAL at 19:32

## 2024-11-12 RX ADMIN — ENOXAPARIN SODIUM 30 MG: 100 INJECTION SUBCUTANEOUS at 08:37

## 2024-11-12 RX ADMIN — SODIUM CHLORIDE, PRESERVATIVE FREE 10 ML: 5 INJECTION INTRAVENOUS at 19:31

## 2024-11-12 RX ADMIN — FERROUS SULFATE TAB 325 MG (65 MG ELEMENTAL FE) 325 MG: 325 (65 FE) TAB at 17:51

## 2024-11-12 RX ADMIN — ATORVASTATIN CALCIUM 20 MG: 20 TABLET, FILM COATED ORAL at 19:32

## 2024-11-12 RX ADMIN — SODIUM CHLORIDE, PRESERVATIVE FREE 10 ML: 5 INJECTION INTRAVENOUS at 08:37

## 2024-11-12 RX ADMIN — Medication 400 MG: at 08:37

## 2024-11-12 NOTE — CARE COORDINATION
11/12/24, 8:36 AM EST    DISCHARGE PLANNING EVALUATION    This SW did speak with Adriana from [facility], [facility] able to accept patient and they will start a precert today. SW will update family.       11:36 AM- SW did speak with patient's spouse and made him aware that patient has been accepted to [facility].

## 2024-11-12 NOTE — PLAN OF CARE
Problem: Chronic Conditions and Co-morbidities  Goal: Patient's chronic conditions and co-morbidity symptoms are monitored and maintained or improved  11/12/2024 0852 by Jaye Rossi RN  Outcome: Progressing  11/11/2024 2106 by Mary Jane Morataya RN  Outcome: Progressing  Flowsheets (Taken 11/11/2024 2106)  Care Plan - Patient's Chronic Conditions and Co-Morbidity Symptoms are Monitored and Maintained or Improved:   Monitor and assess patient's chronic conditions and comorbid symptoms for stability, deterioration, or improvement   Collaborate with multidisciplinary team to address chronic and comorbid conditions and prevent exacerbation or deterioration   Update acute care plan with appropriate goals if chronic or comorbid symptoms are exacerbated and prevent overall improvement and discharge     Problem: Discharge Planning  Goal: Discharge to home or other facility with appropriate resources  11/12/2024 0852 by Jaye Rossi RN  Outcome: Progressing  11/11/2024 2106 by Mary Jane Morataya RN  Outcome: Progressing  Flowsheets (Taken 11/11/2024 2106)  Discharge to home or other facility with appropriate resources:   Identify barriers to discharge with patient and caregiver   Identify discharge learning needs (meds, wound care, etc)   Refer to discharge planning if patient needs post-hospital services based on physician order or complex needs related to functional status, cognitive ability or social support system     Problem: Safety - Adult  Goal: Free from fall injury  11/12/2024 0852 by Jaye Rossi RN  Outcome: Progressing  11/11/2024 2106 by Mary Jane Morataya RN  Outcome: Progressing  Flowsheets (Taken 11/11/2024 2106)  Free From Fall Injury:   Instruct family/caregiver on patient safety   Based on caregiver fall risk screen, instruct family/caregiver to ask for assistance with transferring infant if caregiver noted to have fall risk factors     Problem: Skin/Tissue

## 2024-11-12 NOTE — PROGRESS NOTES
transfer and S for getting washed up. Will assist her with dressing as well)  Homemaking Assistance: Needs assistance  Homemaking Responsibilities:  (only laundry occasionally)  Ambulation Assistance: Needs assistance (pt times, patient was IND but did need supervision from family)  Transfer Assistance: Independent    Active : No     Additional Comments: uses cane for mobility at baseline. assistance from  for ADLs and mobility as needed. Had aide come to house for medications and light housekeeping tasks but has not had the help due to staffing issues. slow pace with small steps at baseline and frequent falls.  and dtr provided all the home information.    SUBJECTIVE: Pt seated in bedside chair upon arrival, lethargic although agreeable to OT session and ADL completion with minimal encouragement.     PAIN: 0/10:     Vitals: Nurse checked vitals prior to session    COGNITION: Slow Processing, Decreased Recall, Decreased Insight, Impaired Memory, and Decreased Problem Solving    ADL:   Grooming: Minimal Assistance, with verbal cues , and with increased time for completion.  Brushing teeth; pt requires assist/cuing to orient to task, sequencing/initiation of task (I.e. placing toothpaste on toothbrush) with pt then able to carryover remainder of task without assistance. Max difficulty and cues to appropriately use mouthwash. Perseverates on task, cues to redirect/finish. Cues to apply deodorant correctly, as initially applies to majority of upper chest/torso and does not apply in axilla. Pt also able to apply lotion, requiring cues for recall of locations to which she already applied lotion.  Bathing: Minimal Assistance.  For washing bottom/periarea. Requires cues throughout for attention to task and appropriately progressing (I.e. to avoid washing areas she had already washed)  Upper Extremity Dressing: Minimal Assistance.  Donning gown; assist for thoroughness with pulling up over shoulders and  tying  Footwear Management: Stand By Assistance.  Socks; figure 4 technique utilized with good safety awareness..    IADL:   Not Tested    BALANCE:  Sitting Balance:  Supervision.    Standing Balance: Contact Guard Assistance.      BED MOBILITY:  Not Tested    TRANSFERS:  Sit to Stand:  Contact Guard Assistance, with increased time for completion, cues for hand placement.    Stand to Sit: Stand By Assistance, with increased time for completion.      FUNCTIONAL MOBILITY:  Assistive Device: Rolling Walker  Assist Level:  Contact Guard Assistance.   Distance:  few steps forward/backward to chair    AM-[facility] Inpatient Daily Activity Raw Score: 19  AM-[facility] Inpatient ADL T-Scale Score : 40.22  ADL Inpatient CMS 0-100% Score: 42.8    Modified Agnes Scale:  Not Applicable    ASSESSMENT:     Activity Tolerance:  Patient tolerance of  treatment: Good treatment tolerance, Limited by fatigue, and Reduced activity pace       Plan: Times Per Week: 5x  Current Treatment Recommendations: Strengthening, Balance training, Functional mobility training, Endurance training, Neuromuscular re-education, Cognitive reorientation, Home management training, Self-Care / ADL, Positioning, Safety education & training, Equipment evaluation, education, & procurement, Patient/Caregiver education & training, Coordination training, Cognitive/Perceptual training    Education:  Learners: Patient  Plan of Care, ADL's, and Orientation    Goals  Short Term Goals  Time Frame for Short Term Goals: at discharge  Short Term Goal 1: Pt will perform functional mobility with walker CGA to/from bathroom to increase I with toileting task.  Short Term Goal 2: Pt will perform standing balance activity for 5 minutes CGA to increase balance and endurance for self care at sink.  Short Term Goal 3: Pt will perform UB/LB dressing mod A to increase I with ADLs.  Short Term Goal 4: Pt will tolerate sitting EOB for >10 minutes SUP with no LOB or cues to participate in

## 2024-11-12 NOTE — PROGRESS NOTES
Hospitalist Progress Note  Internal Medicine Resident      Patient: Ina Cotter 74 y.o. female      Unit/Bed: -06/006-A    Admit Date: 11/8/2024      ASSESSMENT AND PLAN  Active Problems  Subarachnoid hemorrhage right frontoparietal junction.  S/p fall in bathtub approximately 1 week prior to admission; associated with worsening mentation.  CT head 11/8 showed tiny subarachnoid collection in right frontoparietal junction.  Atypical infiltrating gyriform hemorrhage suggesting the right frontal lobe posteriorly.  11/9 MRI brain: Hemorrhage in left frontal lobe, dependent portion right lateral ventricle.  Repeat CT head imaging have been stable to date. Neurosurgery consulted -no acute surgical intervention.  SBP goal 110-160  Repeat CT head 1 week (~11/18/2024)  Follow-up with PCP on DC, neurosurgery follow-up as needed  Neurosurgery okay to resume antiplatelets; home Plavix resumed  Coronary artery disease: S/p cath around 4/2021.  Reportedly told by cardiologist that stent cannot be placed because vessels were too small.  Follows at [facility] with Dr. Delgado.  On aspirin Plavix.    Resuming home Plavix  Home ASA to be resumed per cardiology  Insulin-dependent T2DM, uncontrolled: On insulin pump at home.  Follows with [facility] internal medicine diabetes clinic.  Last A1c 9.1 11/2024.   Will hold inpatient insulin for now due to good glycemic control  Pending C-peptide  Dementia.  Patient with cognitive decline, becoming difficult for her  to care for her.  Social work consulted -DC to SNF given 's difficulty in taking care of patient's ADLs  Continue memantine.  Pending pre-CERT, DC to [facility]  Acute isotonic hyponatremia.  Sodium in low 130s consistently this admission.  Etiology SIADH from head trauma versus hypovolemia.  Serum osmolality 279, urine osm 320, urine sodium <20.  T4 WNL  UA to check urine specific gravity    Resolved Problems    Chronic Conditions (reviewed and stable unless  posteriorly and tiny subarachnoid hemorrhage in right frontal parietal junction. CT spine showed no acute bony pathology.  MRI brain showed hemorrhage left frontal lobe, dependent portion right lateral ventricle.  CTA neck negative for occlusion.  Patient was originally admitted to ICU for management of SAH.  Neurosurgery was consulted, no acute surgical intervention was done.  Neurosurgery cleared patient for restarting of antiplatelets: Plavix was restarted, ASA held to be restarted per PCP/cardiology.  Social work consulted -has been having difficulty managing patient's ADLs at home.  Patient accepted to [facility], pending pre-CERT.    Medications:    Infusion Medications    sodium chloride      dextrose      Scheduled Medications    enoxaparin  30 mg SubCUTAneous Daily    ferrous sulfate dried  50 mg Oral Daily    sodium chloride  500 mL IntraVENous Once    [Held by provider] insulin glargine  5 Units SubCUTAneous Daily    sodium chloride flush  5-40 mL IntraVENous 2 times per day    [Held by provider] aspirin EC  81 mg Oral Daily    atorvastatin  20 mg Oral Nightly    cetirizine  10 mg Oral Daily    clopidogrel  75 mg Oral Daily    DULoxetine  60 mg Oral Daily    famotidine  20 mg Oral Daily    gabapentin  300 mg Oral Daily    isosorbide mononitrate  60 mg Oral Daily    levothyroxine  88 mcg Oral Daily    magnesium oxide  400 mg Oral Daily    [Held by provider] midodrine  2.5 mg Oral TID    memantine  5 mg Oral BID    montelukast  10 mg Oral Nightly    [Held by provider] insulin lispro  0-8 Units SubCUTAneous 4x Daily AC & HS    PRN Meds: sodium chloride flush, sodium chloride, potassium chloride **OR** potassium alternative oral replacement **OR** potassium chloride, magnesium sulfate, ondansetron **OR** ondansetron, polyethylene glycol, acetaminophen **OR** acetaminophen, docusate sodium, nitroGLYCERIN, glucose, dextrose bolus **OR** dextrose bolus, glucagon (rDNA), dextrose    Exam:  BP (!) 151/71

## 2024-11-12 NOTE — DISCHARGE INSTR - COC
Continuity of Care Form    Patient Name: Ina Cotter   :  1950  MRN:  [id number]    Admit date:  2024  Discharge date:  2024    Code Status Order: Limited   Advance Directives:   Advance Care Flowsheet Documentation             Admitting Physician:  Dominic Emerson DO  PCP: Yeny Peralta MD    Discharging Nurse: Luz  Discharging Hospital Unit/Room#: 4B-06/006-A  Discharging Unit Phone Number: [phone]    Emergency Contact:   Extended Emergency Contact Information  Primary Emergency Contact: Nilton Cotter  Address: [address] [place] [id number] [facility]  Home Phone: [phone]  Mobile Phone: [phone]  Relation: Spouse  Secondary Emergency [place]: [place] [address] [facility]  Home Phone: [phone]  Mobile Phone: [phone]  Relation: Child    Past Surgical History:  Past Surgical History:   Procedure Laterality Date    CARDIOVASCULAR STRESS TEST  2013    WNL    CATARACT REMOVAL      bilat     SECTION  1974.1979    CHOLECYSTECTOMY  1980    NECK SURGERY  2011    VITRECTOMY  2009    Rt eye       Immunization History:   Immunization History   Administered Date(s) Administered    COVID-19, MODERNA, (age 12y+), IM, 50mcg/0.5mL 2023    COVID-19, PFIZER Bivalent, DO NOT Dilute, (age 12y+), IM, 30 mcg/0.3 mL 2022    COVID-19, PFIZER GRAY top, DO NOT Dilute, (age 12 y+), IM, 30 mcg/0.3 mL 2022    COVID-19, PFIZER PURPLE top, DILUTE for use, (age 12 y+), 30mcg/0.3mL 2021, 2021, 2021    COVID-19, PFIZER, (age 12y+), IM, 30mcg/0.3mL 2024    Hepatitis B 2015    Hepatitis B (Engerix-B) 08/10/2015, 2015    Influenza A (U3X5-56) Vaccine PF IM 10/26/2009    Influenza Virus Vaccine 2011, 10/23/2012, 10/25/2013, 09/15/2014, 10/09/2015, 10/03/2016, 10/01/2019, 10/01/2020    Influenza, AFLURIA (age 3 y+), FLUZONE, (age 6 mo+), Quadv  [Urine:600]    Safety Concerns:     At Risk for Falls    Impairments/Disabilities:      Vision    Nutrition Therapy:  Current Nutrition Therapy:   - Oral Diet:  General    Routes of Feeding: Oral  Liquids: Thin Liquids  Daily Fluid Restriction: yes - amount 1500 ml  Last Modified Barium Swallow with Video (Video Swallowing Test): not done    Treatments at the Time of Hospital Discharge:   Respiratory Treatments:     Oxygen Therapy:  is not on home oxygen therapy.  Ventilator:    - No ventilator support    Rehab Therapies: Physical Therapy and Occupational Therapy  Weight Bearing Status/Restrictions: No weight bearing restrictions  Other Medical Equipment (for information only, NOT a DME order):  walker  Other Treatments:       Patient's personal belongings (please select all that are sent with patient):  Glasses    RN SIGNATURE:  Electronically signed by Luz Madison RN on 11/14/24 at 11:33 AM EST    CASE MANAGEMENT/SOCIAL WORK SECTION    Inpatient Status Date: 11/09/2024    Readmission Risk Assessment Score:  Readmission Risk              Risk of Unplanned Readmission:  12           Discharging to Facility/ Agency   Name: [facility]  Address: [address] [place] [address], [place]  Phone: [phone]  Fax: [phone]    Dialysis Facility (if applicable)   Name:  Address:  Dialysis Schedule:  Phone:  Fax:    / signature: Electronically signed by DEANN Massey on 11/12/24 at 11:59 AM EST    PHYSICIAN SECTION    Prognosis: Fair    Condition at Discharge: Stable    Rehab Potential (if transferring to Rehab): Fair    Recommended Labs or Other Treatments After Discharge:  BMP in 4 days on Monday 11/18    Physician Certification: I certify the above information and transfer of Ina Cotter  is necessary for the continuing treatment of the diagnosis listed and that she requires Skilled Nursing Facility for greater 30 days.     Update Admission H&P: No change in H&P    PHYSICIAN SIGNATURE:

## 2024-11-12 NOTE — CARE COORDINATION
11/12/24, 4:09 PM EST    DISCHARGE ON GOING EVALUATION    Ina [facility]CK[facility] day: 3  Location: -06/006-A Reason for admit: Subarachnoid bleed (HCC) [I60.9]  Altered mental status, unspecified altered mental status type [R41.82]     Procedures: none    Imaging since last note: none    Barriers to Discharge:  Oriented to self only (does have dementia at baseline). Afebrile. NSR. On room air. SLP/PT/OT. Telemetry, neuro checks, SCDs. Lovenox, Electrolyte replacement protocols. Received 500 ml fld bolus x1 today. Na+ 129.     PCP: Yeny Peralta MD  Readmission Risk Score: 12    Patient Goals/Plan/Treatment Preferences: From home with . Plan for [facility] started on 11/12/24. SW on case.

## 2024-11-12 NOTE — PROGRESS NOTES
Spiritual Health History and Assessment/Progress Note  [facility]    Spiritual/Emotional Needs,  ,  ,      Name: Ina Cotter MRN: [id number]    Age: 74 y.o.     Sex: female   Language: English   Mosque: Other   Subarachnoid bleed (HCC)     Date: 11/12/2024            Total Time Calculated: (P) 10 min              Spiritual Assessment began in [facility] CV[facility] 4B        Referral/Consult From: Rounding   Encounter Overview/Reason: Spiritual/Emotional Needs  Service Provided For: Patient and family together    Jess, Belief, Meaning:   Patient identifies as spiritual  Family/Friends identify as spiritual      Importance and Influence:  Patient has spiritual/personal beliefs that influence decisions regarding their health  Family/Friends have spiritual/personal beliefs that influence decisions regarding the patient's health    Community:  Patient is connected with a spiritual community  Family/Friends are connected with a spiritual community:    Assessment and Plan of Care:   In my encounter with the 74 yr old patient in (they were non-responsive/ resting) so I had conversation with the patient's family. I also came to assess the present spiritual needs of the patient and the family. The pt was admitted due to subarachnoid bleed.     Patient Interventions include: Facilitated expression of thoughts and feelings  Family/Friends Interventions include: Facilitated expression of thoughts and feelings    Patient Plan of Care: Spiritual Care available upon further referral  Family/Friends Plan of Care: Spiritual Care available upon further referral    Electronically signed by INOCENCIO Herring on 11/12/2024 at 4:04 PM

## 2024-11-12 NOTE — PROGRESS NOTES
[facility]  INPATIENT PHYSICAL THERAPY  DAILY NOTE  [facility] CVICU 4B - 4B-06/006-A      Discharge Recommendations: Subacte/Skilled Nursing Facility  Equipment Recommendations: No (defer to next level of care)                 Time In: 1436  Time Out: 1500  Timed Code Treatment Minutes: 24 Minutes  Minutes: 24          Date: 2024  Patient Name: Ina Cotter,  Gender:  female        MRN: [id number]  : 1950  (74 y.o.)     Referring Practitioner: Anderson Walker DO  Diagnosis: Subarachnoid bleed (HCC)  Additional Pertinent Hx: Per EMR:Patient reportedly had a fall about 1 week ago in the bathtub since then became more altered.she does have history of dementia.  She is a poor historian at baseline.  Initially went to outside facility which did not have a CT machine but was transferred to [facility].    -CT head 24 showed tiny subarachnoid collection and a single gyrus of the right frontoparietal junction near the vertex.  Atypical infiltrating gyriform hemorrhage suggesting the right frontal lobe posteriorly.  - MRI head- Mild atrophy and probable ischemic changes in the white matter.There is susceptibility artifact in the right frontal parasagittal cortex,along the tentorium cerebelli on the right side, in the left frontal lobe and in the dependent portion of the right lateral ventricle consistent with hemorrhage.   -Repeat CT head 11/10/24:  Stable right sided intracranial hemorrhage. No resultant mass effect.Neurosurgery following: Keep systolic blood pressure less than 160 and above 110.     Prior Level of Function:  Lives With: Spouse  Type of Home: House  Home Layout: Two level, Able to Live on Main level with bedroom/bathroom  Home Access: Stairs to enter without rails  Entrance Stairs - Number of Steps: 3 CARLY with one small grab bar in the back door  Home Equipment: Cane, Walker - Rolling, Rollator   Bathroom Shower/Tub: Tub/Shower unit  Bathroom Toilet:

## 2024-11-13 LAB
ANION GAP SERPL CALC-SCNC: 12 MEQ/L (ref 8–16)
BUN SERPL-MCNC: 13 MG/DL (ref 7–22)
C PEPTIDE SERPL-MCNC: 2.6 NG/ML (ref 1.1–4.4)
CALCIUM SERPL-MCNC: 8.8 MG/DL (ref 8.5–10.5)
CHLORIDE SERPL-SCNC: 92 MEQ/L (ref 98–111)
CO2 SERPL-SCNC: 25 MEQ/L (ref 23–33)
CREAT SERPL-MCNC: 0.8 MG/DL (ref 0.4–1.2)
GFR SERPL CREATININE-BSD FRML MDRD: 77 ML/MIN/1.73M2
GLUCOSE BLD STRIP.AUTO-MCNC: 182 MG/DL (ref 70–108)
GLUCOSE BLD STRIP.AUTO-MCNC: 210 MG/DL (ref 70–108)
GLUCOSE BLD STRIP.AUTO-MCNC: 219 MG/DL (ref 70–108)
GLUCOSE SERPL-MCNC: 217 MG/DL (ref 70–108)
MAGNESIUM SERPL-MCNC: 1.8 MG/DL (ref 1.6–2.4)
POTASSIUM SERPL-SCNC: 3.7 MEQ/L (ref 3.5–5.2)
SODIUM SERPL-SCNC: 129 MEQ/L (ref 135–145)

## 2024-11-13 PROCEDURE — 83735 ASSAY OF MAGNESIUM: CPT

## 2024-11-13 PROCEDURE — 99233 SBSQ HOSP IP/OBS HIGH 50: CPT | Performed by: INTERNAL MEDICINE

## 2024-11-13 PROCEDURE — 36415 COLL VENOUS BLD VENIPUNCTURE: CPT

## 2024-11-13 PROCEDURE — 97530 THERAPEUTIC ACTIVITIES: CPT

## 2024-11-13 PROCEDURE — 97535 SELF CARE MNGMENT TRAINING: CPT

## 2024-11-13 PROCEDURE — 82948 REAGENT STRIP/BLOOD GLUCOSE: CPT

## 2024-11-13 PROCEDURE — 92523 SPEECH SOUND LANG COMPREHEN: CPT

## 2024-11-13 PROCEDURE — 97110 THERAPEUTIC EXERCISES: CPT

## 2024-11-13 PROCEDURE — 92526 ORAL FUNCTION THERAPY: CPT

## 2024-11-13 PROCEDURE — 80048 BASIC METABOLIC PNL TOTAL CA: CPT

## 2024-11-13 PROCEDURE — 6370000000 HC RX 637 (ALT 250 FOR IP)

## 2024-11-13 PROCEDURE — 97116 GAIT TRAINING THERAPY: CPT

## 2024-11-13 PROCEDURE — 2580000003 HC RX 258

## 2024-11-13 PROCEDURE — 6360000002 HC RX W HCPCS

## 2024-11-13 PROCEDURE — 1200000000 HC SEMI PRIVATE

## 2024-11-13 RX ORDER — POTASSIUM CHLORIDE 1500 MG/1
40 TABLET, EXTENDED RELEASE ORAL ONCE
Status: COMPLETED | OUTPATIENT
Start: 2024-11-13 | End: 2024-11-13

## 2024-11-13 RX ADMIN — MEMANTINE 5 MG: 5 TABLET ORAL at 08:33

## 2024-11-13 RX ADMIN — GABAPENTIN 300 MG: 300 CAPSULE ORAL at 08:32

## 2024-11-13 RX ADMIN — ISOSORBIDE MONONITRATE 60 MG: 60 TABLET, EXTENDED RELEASE ORAL at 08:32

## 2024-11-13 RX ADMIN — ACETAMINOPHEN 650 MG: 325 TABLET ORAL at 18:55

## 2024-11-13 RX ADMIN — CLOPIDOGREL BISULFATE 75 MG: 75 TABLET ORAL at 08:32

## 2024-11-13 RX ADMIN — DULOXETINE HYDROCHLORIDE 60 MG: 60 CAPSULE, DELAYED RELEASE ORAL at 08:32

## 2024-11-13 RX ADMIN — CETIRIZINE HYDROCHLORIDE 10 MG: 10 TABLET, FILM COATED ORAL at 08:32

## 2024-11-13 RX ADMIN — MONTELUKAST 10 MG: 10 TABLET, FILM COATED ORAL at 21:02

## 2024-11-13 RX ADMIN — ENOXAPARIN SODIUM 30 MG: 100 INJECTION SUBCUTANEOUS at 08:31

## 2024-11-13 RX ADMIN — FAMOTIDINE 20 MG: 20 TABLET, FILM COATED ORAL at 08:32

## 2024-11-13 RX ADMIN — Medication 400 MG: at 08:32

## 2024-11-13 RX ADMIN — FERROUS SULFATE TAB 325 MG (65 MG ELEMENTAL FE) 325 MG: 325 (65 FE) TAB at 17:54

## 2024-11-13 RX ADMIN — SODIUM CHLORIDE, PRESERVATIVE FREE 10 ML: 5 INJECTION INTRAVENOUS at 08:32

## 2024-11-13 RX ADMIN — MEMANTINE 5 MG: 5 TABLET ORAL at 21:02

## 2024-11-13 RX ADMIN — ATORVASTATIN CALCIUM 20 MG: 20 TABLET, FILM COATED ORAL at 21:02

## 2024-11-13 RX ADMIN — ACETAMINOPHEN 650 MG: 325 TABLET ORAL at 05:40

## 2024-11-13 RX ADMIN — POTASSIUM CHLORIDE 40 MEQ: 1500 TABLET, EXTENDED RELEASE ORAL at 09:22

## 2024-11-13 RX ADMIN — INSULIN LISPRO 2 UNITS: 100 INJECTION, SOLUTION INTRAVENOUS; SUBCUTANEOUS at 12:10

## 2024-11-13 RX ADMIN — SODIUM CHLORIDE, PRESERVATIVE FREE 5 ML: 5 INJECTION INTRAVENOUS at 21:08

## 2024-11-13 RX ADMIN — INSULIN LISPRO 2 UNITS: 100 INJECTION, SOLUTION INTRAVENOUS; SUBCUTANEOUS at 17:54

## 2024-11-13 RX ADMIN — LEVOTHYROXINE SODIUM 88 MCG: 0.09 TABLET ORAL at 05:40

## 2024-11-13 RX ADMIN — FERROUS SULFATE TAB 325 MG (65 MG ELEMENTAL FE) 325 MG: 325 (65 FE) TAB at 08:32

## 2024-11-13 RX ADMIN — INSULIN LISPRO 2 UNITS: 100 INJECTION, SOLUTION INTRAVENOUS; SUBCUTANEOUS at 21:02

## 2024-11-13 ASSESSMENT — PAIN DESCRIPTION - ORIENTATION: ORIENTATION: OTHER (COMMENT)

## 2024-11-13 ASSESSMENT — PAIN SCALES - WONG BAKER: WONGBAKER_NUMERICALRESPONSE: NO HURT

## 2024-11-13 ASSESSMENT — PAIN DESCRIPTION - DESCRIPTORS
DESCRIPTORS: ACHING
DESCRIPTORS: OTHER (COMMENT)

## 2024-11-13 ASSESSMENT — PAIN SCALES - GENERAL: PAINLEVEL_OUTOF10: 4

## 2024-11-13 ASSESSMENT — PAIN DESCRIPTION - LOCATION
LOCATION: OTHER (COMMENT)
LOCATION: LEG

## 2024-11-13 NOTE — PROGRESS NOTES
[facility] [place]  SPEECH THERAPY  [facility] CVICU 4B  Speech - Language - Cognitive Evaluation + Dysphagia Therapy     Discharge Recommendations: SNF    SLP Individual Minutes  Time In: 1352  Time Out: 1419  Minutes: 27  Timed Code Treatment Minutes: 0 Minutes       Date: 2024  Patient Name: Ina Cotter      CSN: [id number]   : 1950  (74 y.o.)  Gender: female   Referring Physician:  Anderson Walker DO   Diagnosis: Subarachnoid bleed (HCC)  Precautions: Fall risk   History of Present Illness/Injury: Patient admitted to [facility] for above dx. Per chart review, \"Ina Cotter is a 74-year-old female PMH dementia, CAD s/p cath 2021, T2DM, hypothyroidism who presented to [facility] on 2024 for altered mental status. She had a fall approximately 1 week ago in the bathtub which was witnessed by the . AMS has progressively been getting worse s/p fall. She is a poor historian at baseline. However her  states that she is improving and her baseline is approaching her normal mentation. On admission CT head showed atypical infiltrating gyriform hemorrhage in right frontal lobe posteriorly and tiny subarachnoid hemorrhage in right frontal parietal junction. CT spine showed no acute bony pathology. MRI brain showed hemorrhage left frontal lobe, dependent portion right lateral ventricle. CTA neck negative for occlusion. Patient was originally admitted to ICU for management of SAH. Neurosurgery was consulted, no acute surgical intervention was done. Neurosurgery cleared patient for restarting of antiplatelets: Plavix was restarted, ASA held to be restarted per PCP/cardiology. Social work consulted -has been having difficulty managing patient's ADLs at home. Patient accepted to [facility], pending pre-CERT.\"    Past Medical History:   Diagnosis Date    Abnormal ultrasound cardiogram     large global hypokinesis EF35-40%    Anxiety and depression     CKD (chronic kidney disease)     Dementia (HCC)

## 2024-11-13 NOTE — CARE COORDINATION
11/13/24, 10:27 AM EST    DISCHARGE PLANNING EVALUATION     Pc received from Adriana with [facility], Precert approved and good through 11-19.   ONEIL spoke with pts nurse, Ammy, physician is not wanting to discharge today.  SW notified Adriana with [facility].  SW cancelled transport that was originally set up for today.

## 2024-11-13 NOTE — PLAN OF CARE
Problem: Chronic Conditions and Co-morbidities  Goal: Patient's chronic conditions and co-morbidity symptoms are monitored and maintained or improved  11/13/2024 0907 by Jaye Rossi RN  Outcome: Progressing  Flowsheets (Taken 11/12/2024 2018 by Mary Jane Morataya RN)  Care Plan - Patient's Chronic Conditions and Co-Morbidity Symptoms are Monitored and Maintained or Improved:   Monitor and assess patient's chronic conditions and comorbid symptoms for stability, deterioration, or improvement   Update acute care plan with appropriate goals if chronic or comorbid symptoms are exacerbated and prevent overall improvement and discharge   Collaborate with multidisciplinary team to address chronic and comorbid conditions and prevent exacerbation or deterioration  11/12/2024 2018 by Mary Jane Morataya RN  Outcome: Progressing  Flowsheets (Taken 11/12/2024 2018)  Care Plan - Patient's Chronic Conditions and Co-Morbidity Symptoms are Monitored and Maintained or Improved:   Monitor and assess patient's chronic conditions and comorbid symptoms for stability, deterioration, or improvement   Update acute care plan with appropriate goals if chronic or comorbid symptoms are exacerbated and prevent overall improvement and discharge   Collaborate with multidisciplinary team to address chronic and comorbid conditions and prevent exacerbation or deterioration     Problem: Discharge Planning  Goal: Discharge to home or other facility with appropriate resources  11/13/2024 0907 by Jaye Rossi RN  Outcome: Progressing  Flowsheets (Taken 11/12/2024 2018 by Mary Jane Morataya RN)  Discharge to home or other facility with appropriate resources:   Identify barriers to discharge with patient and caregiver   Refer to discharge planning if patient needs post-hospital services based on physician order or complex needs related to functional status, cognitive ability or social support system   Identify discharge

## 2024-11-13 NOTE — PROGRESS NOTES
Discharge Instructions  Dental Pain    You have been seen today for a toothache. Your pain may be caused by an exposed nerve, an infection (pulpitis), a root abscess, or other problems. You will need to see a dentist for a solution to your tooth problem. Emergency Department care is only to help control your problem until you can see a dentist.  Today, we did not find any sign that your toothache was caused by a serious condition, but sometimes symptoms develop over time and cannot be found during an emergency visit, so it is very important that you follow up with your dentist.      Return to the Emergency Department if:  You develop a fever over 101 degrees Fahrenheit.  You can t open your mouth normally, can t move your tongue well, or can t swallow.  You have new or increased swelling of your face or neck.  You develop drainage of pus or foul smelling material from around your tooth.  What can I do to help myself?  Take any antibiotic the doctor may have prescribed for you today.  Avoid very hot or very cold foods as both can cause pain.  Make an appointment to see a dentist as soon as possible. If you wish, we can provide you with a list of low-cost dental clinics.   If you were given a prescription for medicine here today, be sure to read all of the information (including the package insert) that comes with your prescription.  This will include important information about the medicine, its side effects, and any warnings that you need to know about.  The pharmacist who fills the prescription can provide more information and answer questions you may have about the medicine.  If you have questions or concerns that the pharmacist cannot address, please call or return to the Emergency Department.   Opioid Medication Information    Pain medications are among the most commonly prescribed medicines, so we are including this information for all our patients. If you did not receive pain medication or get a prescription  [facility]  STRZ CVICU 4B  Occupational Therapy  Daily Note    Discharge Recommendations: Subacute/skilled nursing facility  Equipment Recommendations: No        Time In: 1113  Time Out: 1137  Timed Code Treatment Minutes: 24 Minutes  Minutes: 24          Date: 2024  Patient Name: Ina Cotter,   Gender: female      Room: 4B-06/006-A  MRN: [id number]  : 1950  (74 y.o.)  Referring Practitioner: Anderson Walker DO  Diagnosis: Subarachnoid bleed (HCC)  Additional Pertinent Hx: Patient is a 74-year-old female who presented to [facility] altered mental status.  She had a fall approximately 1 week ago in the bathtub. Since then increasing altered mental status although, she does have history of dementia.  She is a poor historian at baseline.  However her  states that she is improving and her baseline is approaching her normal mentation.  On admission CT head showed atypical infiltrating gyriform hemorrhage suggesting in the right frontal lobe posteriorly.  Tiny subarachnoid collection in a single gyrus of the right frontal parietal junction near the vertex.  Moderate atrophy and mild small vessel chronic ischemic disease.  CT spine showed no acute bony pathology.    Restrictions/Precautions:  Restrictions/Precautions: Fall Risk, General Precautions, Seizure  Position Activity Restriction  Other position/activity restrictions: maintaing SBP goal between 110 and 160.     Social/Functional History:  Lives With: Spouse  Type of Home: House  Home Layout: Two level, Able to Live on Main level with bedroom/bathroom  Home Access: Stairs to enter without rails  Entrance Stairs - Number of Steps: 3 CARLY with one small grab bar in the back door  Home Equipment: Cane, Walker - Rolling, Rollator   Bathroom Shower/Tub: Tub/Shower unit  Bathroom Toilet: Standard  Bathroom Equipment: Grab bars in shower, Tub transfer bench, 3-in-1 commode    Receives Help From: Family  ADL Assistance: Needs assistance  for pain medicine, you can ignore it.     You may have been given a prescription for an opioid (narcotic) pain medicine and/or have received a pain medicine while here in the Emergency Department. These medicines can make you drowsy or impaired. You must not drive, operate dangerous equipment, or engage in any other dangerous activities while taking these medications. If you drive while taking these medications, you could be arrested for DUI, or driving under the influence. Do not drink any alcohol while you are taking these medications.     Opioid pain medications can cause addiction. If you have a history of chemical dependency of any type, you are at a higher risk of becoming addicted to pain medications.  Only take these prescribed medications to treat your pain when all other options have been tried. Take it for as short a time and as few doses as possible. Store your pain pills in a secure place, as they are frequently stolen and provide a dangerous opportunity for children or visitors in your house to start abusing these powerful medications. We will not replace any lost or stolen medicine.  As soon as your pain is better, you should flush all your remaining medication.     Many prescription pain medications contain Tylenol  (acetaminophen), including Vicodin , Tylenol #3 , Norco , Lortab , and Percocet .  You should not take any extra pills of Tylenol  if you are using these prescription medications or you can get very sick.  Do not ever take more than 3000 mg of acetaminophen in any 24 hour period.    All opioids tend to cause constipation. Drink plenty of water and eat foods that have a lot of fiber, such as fruits, vegetables, prune juice, apple juice and high fiber cereal.  Take a laxative if you don t move your bowels at least every other day. Miralax , Milk of Magnesia, Colace , or Senna  can be used to keep you regular.      Remember that you can always come back to the Emergency Department if you are  not able to see your regular doctor in the amount of time listed above, if you get any new symptoms, or if there is anything that worries you.

## 2024-11-13 NOTE — CARE COORDINATION
11/13/24, 2:58 PM EST    DISCHARGE ON GOING EVALUATION    Ina MCKEE [facility] day: 4  Location: -06/006-A Reason for admit: Subarachnoid bleed (HCC) [I60.9]  Altered mental status, unspecified altered mental status type [R41.82]     Procedures: none    Imaging since last note: none    Barriers to Discharge: Hospitalist following. PT/OT/SLP. SSI. Lantus. Na+ 129. 1500ml fluid restriction.     PCP: Yeny Peralta MD  Readmission Risk Score: 10.6    Patient Goals/Plan/Treatment Preferences: Precert approved through 11/19 for [facility].

## 2024-11-13 NOTE — PROGRESS NOTES
[facility]  INPATIENT PHYSICAL THERAPY  DAILY NOTE  STRZ CVICU 4B - 4B-06/006-A      Discharge Recommendations: Subacte/Skilled Nursing Facility  Equipment Recommendations: No (defer to next level of care)                 Time In: 729  Time Out: 811  Timed Code Treatment Minutes: 42 Minutes  Minutes: 42          Date: 2024  Patient Name: Ina Cotter,  Gender:  female        MRN: [id number]  : 1950  (74 y.o.)     Referring Practitioner: Anderson Walker DO  Diagnosis: Subarachnoid bleed (HCC)  Additional Pertinent Hx: Per EMR:Patient reportedly had a fall about 1 week ago in the bathtub since then became more altered.she does have history of dementia.  She is a poor historian at baseline.  Initially went to outside facility which did not have a CT machine but was transferred to [facility].    -CT head 24 showed tiny subarachnoid collection and a single gyrus of the right frontoparietal junction near the vertex.  Atypical infiltrating gyriform hemorrhage suggesting the right frontal lobe posteriorly.  - MRI head- Mild atrophy and probable ischemic changes in the white matter.There is susceptibility artifact in the right frontal parasagittal cortex,along the tentorium cerebelli on the right side, in the left frontal lobe and in the dependent portion of the right lateral ventricle consistent with hemorrhage.   -Repeat CT head 11/10/24:  Stable right sided intracranial hemorrhage. No resultant mass effect.Neurosurgery following: Keep systolic blood pressure less than 160 and above 110.     Prior Level of Function:  Lives With: Spouse  Type of Home: House  Home Layout: Two level, Able to Live on Main level with bedroom/bathroom  Home Access: Stairs to enter without rails  Entrance Stairs - Number of Steps: 3 CARLY with one small grab bar in the back door  Home Equipment: Cane, Walker - Rolling, Rollator   Bathroom Shower/Tub: Tub/Shower unit  Bathroom Toilet:  activities  General Plan:  (5xN)    Education:  Learners: Patient  Patient Education: Plan of Care, Home Exercise Program, Transfers, Gait    Goals:  Patient Goals : Pt unable to state goal, but  stated he hopes to take patient home.  Short Term Goals  Time Frame for Short Term Goals: By discharge  Short Term Goal 1: Supine to/from sit at CGA in order to get in/out of bed.  Short Term Goal 2: Sit to/from stand at CGA of 1 in order to get up to walk.  Short Term Goal 3: Ambulate 25 feet with RW at Min A of 1 in order to walk in home safely.  Short Term Goal 4: Ascend/Descend 3 steps with 1 handrails at Min A in order to enter/exit home.  Long Term Goals  Time Frame for Long Term Goals : NA due to short ELOS    Following session, patient left in safe position with all fall risk precautions in place.

## 2024-11-13 NOTE — PROGRESS NOTES
Hospitalist Progress Note  Internal Medicine Resident      Patient: Ina Cotter 74 y.o. female      Unit/Bed: -06/006-A    Admit Date: 11/8/2024      ASSESSMENT AND PLAN  Active Problems  Subarachnoid hemorrhage right frontoparietal junction.  S/p fall in bathtub approximately 1 week prior to admission; associated with worsening mentation.  CT head 11/8 showed tiny subarachnoid collection in right frontoparietal junction.  Atypical infiltrating gyriform hemorrhage suggesting the right frontal lobe posteriorly.  11/9 MRI brain: Hemorrhage in left frontal lobe, dependent portion right lateral ventricle.  Repeat CT head imaging have been stable to date. Neurosurgery consulted -no acute surgical intervention.  SBP goal 110-160  Repeat CT head 1 week (~11/18/2024)  Follow-up with PCP on DC, neurosurgery follow-up as needed  Neurosurgery okay to resume antiplatelets; home Plavix resumed  Coronary artery disease: S/p cath around 4/2021.  Reportedly told by cardiologist that stent cannot be placed because vessels were too small.  Follows at [facility] with Dr. Delgado.  On aspirin Plavix.    Resuming home Plavix  Home ASA to be resumed per cardiology  Insulin-dependent type 1 DM, uncontrolled: On insulin pump at home.  Follows with [facility] internal medicine diabetes clinic.  Last A1c 9.1 11/2024.  11/12: C-peptide WNL.  Low-dose SSI   Will DC insulin pump since patient is DC to SNF, concern for inadequate moderating/control of hyperglycemia with insulin pump  Dementia.  Patient with cognitive decline, becoming difficult for her  to care for her.  Social work consulted  Continue memantine.  Pending pre-CERT, DC to [facility]  Acute isotonic hyponatremia.  Sodium in low 130s consistently this admission.  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  Serum osmolality 279, urine osm 320, urine sodium <20.  T4 WNL.  UA specific gravity 1.022 -concentrated urine.  11/12 100 cc NS fluid challenge: -> 129, hyponatremia not due  admission CT head showed atypical infiltrating gyriform hemorrhage in right frontal lobe posteriorly and tiny subarachnoid hemorrhage in right frontal parietal junction. CT spine showed no acute bony pathology.  MRI brain showed hemorrhage left frontal lobe, dependent portion right lateral ventricle.  CTA neck negative for occlusion.  Patient was originally admitted to ICU for management of SAH.  Neurosurgery was consulted, no acute surgical intervention was done.  Neurosurgery cleared patient for restarting of antiplatelets: Plavix was restarted, ASA held to be restarted per PCP/cardiology.  Social work consulted -has been having difficulty managing patient's ADLs at home.  Patient accepted to [facility], pending pre-CERT.    Medications:    Infusion Medications    sodium chloride      dextrose      Scheduled Medications    enoxaparin  30 mg SubCUTAneous Daily    ferrous sulfate  325 mg Oral BID WC    insulin glargine  5 Units SubCUTAneous Daily    sodium chloride flush  5-40 mL IntraVENous 2 times per day    [Held by provider] aspirin EC  81 mg Oral Daily    atorvastatin  20 mg Oral Nightly    cetirizine  10 mg Oral Daily    clopidogrel  75 mg Oral Daily    DULoxetine  60 mg Oral Daily    famotidine  20 mg Oral Daily    gabapentin  300 mg Oral Daily    isosorbide mononitrate  60 mg Oral Daily    levothyroxine  88 mcg Oral Daily    magnesium oxide  400 mg Oral Daily    [Held by provider] midodrine  2.5 mg Oral TID    memantine  5 mg Oral BID    montelukast  10 mg Oral Nightly    insulin lispro  0-8 Units SubCUTAneous 4x Daily AC & HS    PRN Meds: sodium chloride flush, sodium chloride, potassium chloride **OR** potassium alternative oral replacement **OR** potassium chloride, magnesium sulfate, ondansetron **OR** ondansetron, polyethylene glycol, acetaminophen **OR** acetaminophen, docusate sodium, nitroGLYCERIN, glucose, dextrose bolus **OR** dextrose bolus, glucagon (rDNA), dextrose    Exam:  BP (!) 153/70

## 2024-11-14 VITALS
RESPIRATION RATE: 16 BRPM | WEIGHT: 97.66 LBS | DIASTOLIC BLOOD PRESSURE: 63 MMHG | SYSTOLIC BLOOD PRESSURE: 144 MMHG | HEIGHT: 57 IN | TEMPERATURE: 97.7 F | HEART RATE: 69 BPM | BODY MASS INDEX: 21.07 KG/M2 | OXYGEN SATURATION: 100 %

## 2024-11-14 LAB
ANION GAP SERPL CALC-SCNC: 13 MEQ/L (ref 8–16)
BUN SERPL-MCNC: 10 MG/DL (ref 7–22)
CALCIUM SERPL-MCNC: 9 MG/DL (ref 8.5–10.5)
CHLORIDE SERPL-SCNC: 92 MEQ/L (ref 98–111)
CO2 SERPL-SCNC: 24 MEQ/L (ref 23–33)
CREAT SERPL-MCNC: 0.7 MG/DL (ref 0.4–1.2)
GFR SERPL CREATININE-BSD FRML MDRD: > 90 ML/MIN/1.73M2
GLUCOSE BLD STRIP.AUTO-MCNC: 180 MG/DL (ref 70–108)
GLUCOSE BLD STRIP.AUTO-MCNC: 215 MG/DL (ref 70–108)
GLUCOSE SERPL-MCNC: 142 MG/DL (ref 70–108)
POTASSIUM SERPL-SCNC: 3.9 MEQ/L (ref 3.5–5.2)
SODIUM SERPL-SCNC: 129 MEQ/L (ref 135–145)

## 2024-11-14 PROCEDURE — 6370000000 HC RX 637 (ALT 250 FOR IP)

## 2024-11-14 PROCEDURE — 99239 HOSP IP/OBS DSCHRG MGMT >30: CPT | Performed by: INTERNAL MEDICINE

## 2024-11-14 PROCEDURE — 2580000003 HC RX 258

## 2024-11-14 PROCEDURE — 6360000002 HC RX W HCPCS

## 2024-11-14 PROCEDURE — 80048 BASIC METABOLIC PNL TOTAL CA: CPT

## 2024-11-14 PROCEDURE — 82948 REAGENT STRIP/BLOOD GLUCOSE: CPT

## 2024-11-14 PROCEDURE — 36415 COLL VENOUS BLD VENIPUNCTURE: CPT

## 2024-11-14 RX ORDER — INSULIN GLARGINE 100 [IU]/ML
5 INJECTION, SOLUTION SUBCUTANEOUS DAILY
Refills: 0 | DISCHARGE
Start: 2024-11-15 | End: 2024-12-15

## 2024-11-14 RX ORDER — MONTELUKAST SODIUM 10 MG/1
10 TABLET ORAL NIGHTLY
DISCHARGE
Start: 2024-11-14

## 2024-11-14 RX ADMIN — INSULIN LISPRO 2 UNITS: 100 INJECTION, SOLUTION INTRAVENOUS; SUBCUTANEOUS at 12:23

## 2024-11-14 RX ADMIN — Medication 400 MG: at 09:32

## 2024-11-14 RX ADMIN — SODIUM CHLORIDE, PRESERVATIVE FREE 10 ML: 5 INJECTION INTRAVENOUS at 09:32

## 2024-11-14 RX ADMIN — DULOXETINE HYDROCHLORIDE 60 MG: 60 CAPSULE, DELAYED RELEASE ORAL at 09:31

## 2024-11-14 RX ADMIN — ENOXAPARIN SODIUM 30 MG: 100 INJECTION SUBCUTANEOUS at 09:31

## 2024-11-14 RX ADMIN — ISOSORBIDE MONONITRATE 60 MG: 60 TABLET, EXTENDED RELEASE ORAL at 09:32

## 2024-11-14 RX ADMIN — MEMANTINE 5 MG: 5 TABLET ORAL at 09:32

## 2024-11-14 RX ADMIN — FERROUS SULFATE TAB 325 MG (65 MG ELEMENTAL FE) 325 MG: 325 (65 FE) TAB at 09:32

## 2024-11-14 RX ADMIN — CLOPIDOGREL BISULFATE 75 MG: 75 TABLET ORAL at 09:32

## 2024-11-14 RX ADMIN — GABAPENTIN 300 MG: 300 CAPSULE ORAL at 09:32

## 2024-11-14 RX ADMIN — LEVOTHYROXINE SODIUM 88 MCG: 0.09 TABLET ORAL at 05:09

## 2024-11-14 RX ADMIN — CETIRIZINE HYDROCHLORIDE 10 MG: 10 TABLET, FILM COATED ORAL at 09:32

## 2024-11-14 RX ADMIN — FAMOTIDINE 20 MG: 20 TABLET, FILM COATED ORAL at 09:32

## 2024-11-14 RX ADMIN — INSULIN GLARGINE 5 UNITS: 100 INJECTION, SOLUTION SUBCUTANEOUS at 09:31

## 2024-11-14 ASSESSMENT — PAIN SCALES - GENERAL: PAINLEVEL_OUTOF10: 0

## 2024-11-14 NOTE — DISCHARGE SUMMARY
Consider MRI follow-up.      This document has been electronically signed by: Glenn Heaton III, MD on    11/09/2024 01:43 AM      All CTs at this facility use dose modulation techniques and iterative    reconstructions, and/or weight-based dosing   when appropriate to reduce radiation to a low as reasonably achievable.      3D Post-processing was performed on this study.      XR CHEST PORTABLE   Final Result   Impression:   No acute cardiopulmonary findings.      This document has been electronically signed by: Glenn Heaton III, MD on    11/09/2024 12:48 AM           Consults:   IP CONSULT TO PHARMACY  IP CONSULT TO SOCIAL WORK    Disposition: SNF  Condition at Discharge: Stable    Code Status: Limited    Patient Instructions:    Discharge lab work: BMP x 11/18, CT head 11/18  Activity: activity as tolerated  Diet: No diet orders on file      Follow-up visits:   [facility]  [address] [place] [address] [phone]        Yeny Peralta MD  [address] [place] [address] [phone]    Call  Schedule an appointment in 1-2 weeks         Discharge Medications:      Medication List        START taking these medications      insulin glargine 100 UNIT/ML injection vial  Commonly known as: LANTUS  Inject 5 Units into the skin daily  Start taking on: November 15, 2024            CHANGE how you take these medications      montelukast 10 MG tablet  Commonly known as: SINGULAIR  Take 1 tablet by mouth nightly  What changed: when to take this            CONTINUE taking these medications      atorvastatin 20 MG tablet  Commonly known as: LIPITOR     B-D UF III MINI PEN NEEDLES 31G X 5 MM Misc  Generic drug: Insulin Pen Needle     Baqsimi One Pack 3 MG/DOSE Powd  Generic drug: Glucagon  Use to treat low blood sugar     cetirizine 10 MG tablet  Commonly known as: ZYRTEC     clopidogrel 75 MG tablet  Commonly known as: PLAVIX     * Dexcom G7 Sensor Misc     * Dexcom G6 Sensor Misc  Change sensors every 10  care.      Signed:    Electronically signed by Eric CAMPOS DO on 11/14/24 at 6:35 PM EST     Case was discussed with Attending, Dr. Alford

## 2024-11-14 NOTE — CARE COORDINATION
11/14/24, 10:21 AM EST    DISCHARGE PLANNING EVALUATION     Pt being discharged today. WANDER notified Adriana with [facility], precert is still good.  Wander notified pts nurse Luz Viramontes called [facility], transport set up for 1:30 pm, forms faxed to [facility].

## 2024-12-10 ENCOUNTER — TELEPHONE (OUTPATIENT)
Dept: INTERNAL MEDICINE CLINIC | Age: 74
End: 2024-12-10

## 2024-12-10 NOTE — TELEPHONE ENCOUNTER
Patient Education     Cisplatin Solution for injection  What is this medicine?  CISPLATIN (SIS dwight tin) is a chemotherapy drug. It targets fast dividing cells, like cancer cells, and causes these cells to die. This medicine is used to treat many types of cancer like bladder, ovarian, and testicular cancers.  This medicine may be used for other purposes; ask your health care provider or pharmacist if you have questions.  What should I tell my health care provider before I take this medicine?  They need to know if you have any of these conditions:  · blood disorders  · hearing problems  · kidney disease  · recent or ongoing radiation therapy  · an unusual or allergic reaction to cisplatin, carboplatin, other chemotherapy, other medicines, foods, dyes, or preservatives  · pregnant or trying to get pregnant  · breast-feeding  How should I use this medicine?  This drug is given as an infusion into a vein. It is administered in a hospital or clinic by a specially trained health care professional.  Talk to your pediatrician regarding the use of this medicine in children. Special care may be needed.  Overdosage: If you think you have taken too much of this medicine contact a poison control center or emergency room at once.  NOTE: This medicine is only for you. Do not share this medicine with others.  What if I miss a dose?  It is important not to miss a dose. Call your doctor or health care professional if you are unable to keep an appointment.  What may interact with this medicine?  · dofetilide  · foscarnet  · medicines for seizures  · medicines to increase blood counts like filgrastim, pegfilgrastim, sargramostim  · probenecid  · pyridoxine used with altretamine  · rituximab  · some antibiotics like amikacin, gentamicin, neomycin, polymyxin B, streptomycin, tobramycin  · sulfinpyrazone  · vaccines  · zalcitabine  Talk to your doctor or health care professional before taking any of these  Request per daughter jC for scripts for Humalog pens and Lantus pens as well as pen needles. Tandem insulin pump was stopped during recent hospitalization and she was sent home on insulin pens/ MDI.    Spoke with daughter.  She does not know what the doses of insulin are for Ina. Also discussed not getting rid of the insulin pump yet--put it aside for now.  Encourage to begin use of the Dexcom CGM again--using the  or smart phone. She states she will follow up with her parents for this.    Call to Ney. He is unable to find the paper that Ina's insulin doses were written on. He cannot recall the doses of insulin per memory.   Instructions for Bill  1-call the clinic back as soon as you find the insulin doses prescribed  2-look for the reader that may have been sent with the original Dexcom  Supplies  3-Follow up appointment with Saul 12/17/2024      Dr. Peralta,  Ina needs scripts sent in for her insulin pens and pen needles.  Please send in prescriptions for:          Lantus pens Sig. 15 units in AM. Qty 5 pens. With refills          Humalog Qwikpen Sig. 3-9 units with each meal per scale                                 TDD 27 units.Qty 5 pens. With refills          Pen needles 5 or 6mm. 4 injections daily. Qty 100. With refills.    Thank you.     medicines:  · acetaminophen  · aspirin  · ibuprofen  · ketoprofen  · naproxen  This list may not describe all possible interactions. Give your health care provider a list of all the medicines, herbs, non-prescription drugs, or dietary supplements you use. Also tell them if you smoke, drink alcohol, or use illegal drugs. Some items may interact with your medicine.  What should I watch for while using this medicine?  Your condition will be monitored carefully while you are receiving this medicine. You will need important blood work done while you are taking this medicine.  This drug may make you feel generally unwell. This is not uncommon, as chemotherapy can affect healthy cells as well as cancer cells. Report any side effects. Continue your course of treatment even though you feel ill unless your doctor tells you to stop.  In some cases, you may be given additional medicines to help with side effects. Follow all directions for their use.  Call your doctor or health care professional for advice if you get a fever, chills or sore throat, or other symptoms of a cold or flu. Do not treat yourself. This drug decreases your body's ability to fight infections. Try to avoid being around people who are sick.  This medicine may increase your risk to bruise or bleed. Call your doctor or health care professional if you notice any unusual bleeding.  Be careful brushing and flossing your teeth or using a toothpick because you may get an infection or bleed more easily. If you have any dental work done, tell your dentist you are receiving this medicine.  Avoid taking products that contain aspirin, acetaminophen, ibuprofen, naproxen, or ketoprofen unless instructed by your doctor. These medicines may hide a fever.  Do not become pregnant while taking this medicine. Women should inform their doctor if they wish to become pregnant or think they might be pregnant. There is a potential for serious side effects to an unborn child. Talk  to your health care professional or pharmacist for more information. Do not breast-feed an infant while taking this medicine.  Drink fluids as directed while you are taking this medicine. This will help protect your kidneys.  Call your doctor or health care professional if you get diarrhea. Do not treat yourself.  What side effects may I notice from receiving this medicine?  Side effects that you should report to your doctor or health care professional as soon as possible:  · allergic reactions like skin rash, itching or hives, swelling of the face, lips, or tongue  · signs of infection - fever or chills, cough, sore throat, pain or difficulty passing urine  · signs of decreased platelets or bleeding - bruising, pinpoint red spots on the skin, black, tarry stools, nosebleeds  · signs of decreased red blood cells - unusually weak or tired, fainting spells, lightheadedness  · breathing problems  · changes in hearing  · gout pain  · low blood counts - This drug may decrease the number of white blood cells, red blood cells and platelets. You may be at increased risk for infections and bleeding.  · nausea and vomiting  · pain, swelling, redness or irritation at the injection site  · pain, tingling, numbness in the hands or feet  · problems with balance, movement  · trouble passing urine or change in the amount of urine  Side effects that usually do not require medical attention (report to your doctor or health care professional if they continue or are bothersome):  · changes in vision  · loss of appetite  · metallic taste in the mouth or changes in taste  This list may not describe all possible side effects. Call your doctor for medical advice about side effects. You may report side effects to FDA at [phone]-FDA-[phone].  Where should I keep my medicine?  This drug is given in a hospital or clinic and will not be stored at home.  NOTE:This sheet is a summary. It may not cover all possible information. If you have questions  about this medicine, talk to your doctor, pharmacist, or health care provider. Copyright© 2016 [employer]         Cisplatin Solution rachelle txhaj  Yam tshuaj no zoo li hudson?  CISPLATIN (SIS plain) yog ib cov tshuaj kws khomob. Nws yooj paloma fav faib hells, xws li mob kheesxaws, thiab ua rachelle cov hniav tuag. Cov tshuaj no yog siv los rudi ntau rand mob ntsws xws li mob kheesxaws ncauj tsev menyuam, zes qe menyuam, thiab mob kheesxaws ncauj tsev menyuam.  Debra zaum cov tshuaj no debra zaum yuav siv tau rachelle lwm yam laj thawj; nug koj tus kws rudi mob los sis tus kws rudi mob yog tias koj muaj larisa nug.  Kuv yuav tsum qhia rachelle kuv tus kws rudi mob ua ntej kuv noj tshuaj?  Lawv yuav tsum paub isabell koj puas muaj debra yam mob no:  ntshav disorders  thai tsis hnov larisa  mob raum  tsis ntev los no los sis tsis ntev los no mus tsis ntev los no  thai tsis haum tshuaj lossis thai tsis haum tshuaj rachelle cisplatin, carboplatin, lwm yam tshuaj, lwm yam tshuaj, khoom noj, khoom noj, dyes, lossis preservatives  xeeb tub los yog sim mus xeeb tub  pub mis rachelle noj  Kuv yuav siv cov tshuaj no li hudson?  Cov tshuaj no yog muab ua ib infusion rachelle hauv ib cov hlab ntsha. Nws yog tswj nyob rachelle hauv ib lub tsev rudi mob los yog lub tsev rudi mob uas kawm los ntawm ib tug kws rudi mob tshwj xeeb uas kawm tiav los.  Eloina nrog koj tus kws rudi mob eloina txog txoj thai siv tshuaj no rachelle cov me nyuam. Tshwj xeeb debra zaum yuav tsum tau muaj.  Overdosage: Yog koj xav tias koj tau noj ntau yam tshuaj no los tiv thaiv kom txhob raug tshuaj lom lossis thaum muaj xwm ceev thaum twg.  FAJ SEEB: Qhov tshuaj no tsuas yog rachelle koj xwb. Tsis txhob koom siv cov tshuaj no rachelle lwm tus.  Yuav ua li hudson yog kuv tsis npaum li hudson?  Nws tseem ceeb heev uas tsis tau npeeg ib koob txhaj tshuaj. Hu rachelle koj tus kws rudi mob lossis cov kws rudi mob yog tias koj tsis tau teem sij hawm teem tseg.  Cov tshuaj no yuav ua li hudson nrog cov tshuaj no?  ntsawj nyab  txheej txheem  tshuaj nriaj golden nriaj  taw  Tshuaj kom nce ntshav suav xws li filgrastim, pegfilgrastim, sargramostim  pheej txwv  pyridoxine siv altretamine  rituximab  ib co tshuaj tua kab mob xws li amikacin, gentamicin, neomycin, polymyxin B, mob streptomycin, tobramycin  pheej txaus ntshai  tshuaj tiv thaiv  peevxwm  Romina nrog koj tus kws rudi mob lossis tus kws rudi mob ua ntej yuav noj ib yam ntawm cov tshuaj no:  acetaminophen  tshuaj aspirin  ibuprofen  ketoprofen  zaub txhwb  Daim ntawv no yuav tsis piav tag nrho cov thai sib tshuam. Muab koj lub tsev rudi mob/kws rudi mob tag nrho cov tshuaj, tshuaj, tshuaj uas tsis yog tshuaj, lossis noj tshuaj, lossis noj tshuaj pab koj. Kuj qhia rachelle lawv paub tias koj haus luam yeeb, haus cawv, lossis siv yeeb tshuaj tsis raug kwesi. Ib txhia kuj yuav cuam tshuam nrog koj cov tshuaj.  Kuv yuav tsum tau saib rachelle thaum uas siv cov tshuaj no?  Koj tus mob yuav tsum xyuas kom zoo thaum koj tau txais cov tshuaj no. Koj yuav tsum tau noj cov ntshav ua hauj lwm thaum koj noj tshuaj.  Cov tshuaj no debra zaum yuav ua rachelle koj xav tias feem ntau unwell. Qhov no yuav tsis tshwm sim, raws li kws khomob yuav ua rachelle cov hlwb zoo li mob kheesxaws ntawm hlwb. Daim ntawv qhia txog debra thai phiv los sis mary jo. Yog koj xav tias koj tus kws rudi mob tshwj tias ntshe ho yog koj tus kws rudi mob twb qhia rachelle koj lawm.  Qee zaus, debra zaum koj yuav tau muab tshuaj ntxiv nrog rachelle debra thai phiv los sis mary jo tshuaj. Ua raws li tag nrho cov larisa qhia rachelle lawv siv.  Hu rachelle koj tus kws khomob lossis tsev rudi mob/kws rudi mob yog tias koj tau ua npaws, ua npaws, lossis mob qa, lossis lwm yam mob khaub thuas lossis mob khaub thuas. Tsis txhob rudi koj tus kheej. Qhov tshuaj no tsub kom koj lub cev muaj peev xwm tua tau tus kab mob no. Sim kom tsis txhob nyob ib ncig ntawm cov neeg uas muaj mob.  Debra zaum cov tshuaj no debra zaum yuav ua rachelle koj cov ntshav lossis los ntshav. Hu rachelle koj tus kws rudi mob lossis cov kws rudi mob yog tias koj pom debra yam txawv los  ntshav.  Thai txhuam hniav thiab xuas hlua dig koj cov hniav los sis siv ib cov tshuaj txhuam hniav vim tias koj yuav kis tau tus kab mob los yog los ntshav ntau yooj pal[place]. Yog hais tias koj muaj ib qho thai rudi hniav li hudson, qhia rachelle koj tus kws rudi hniav uas koj tau txais cov tshuaj no.  Tsis txhob noj cov khoom uas muaj aspirin, acetaminophen, ibuprofen, naproxen, los yog ketoprofen tsuas yog qhia los ntawm koj tus kws rudi mob. Cov tshuaj no debra zaum yuav nkaum tau kub ib ce.  Tsis txhob smith xeeb tub thaum noj cov tshuaj no. Cov poj niam yuav tsum qhia rachelle lawv tus kws rudi mob yog tias lawv xav kom xeeb tub lossis xav tias lawv yuav xeeb tub. Nws muaj nws ib qho thai phiv los sis mary jo loj heev rachelle tus me nyuam tseem nyob hauv plab. Romina nrog koj tus kws rudi mob los sis tus kws rudi mob los sis tus kws muab tshuaj kom paub ntxiv. Tsis txhob pub mis rachelle menyuam mos liab thaum noj tshuaj.  Haus dej kom kua thaum koj noj cov tshuaj no. Qhov no yuav pab tiv thaiv koj ob lub raum.  Hu rachelle koj tus kws rudi mob lossis cov kws rudi mob yog tias koj zawv plab. Tsis txhob rudi koj tus kheej.  Debra zaum cov thai phiv los sis mary jo tshuaj no li hudson?  Debra thai phiv los sis koj tus kws rudi mob los sis cov kws rudi mob deb li deb tau:  tsis haum xws li tawv nqaij, khaus, o, o ntawm lub ntsej muag, di ncauj, los yog tus nplaig  debra yam tshwm sim - kub ib ce, ua npaws, hnoos, mob caj pas los yog mob nyuab heev zuj zus  debra yam tshwm sim uas tsis muaj platelets los ntshav - nqaij doog, pinpoint liab me ntsis ntawm daim tawv nqaij, dub, tarry stools, nosebleeds  Debra yam tshwm sim uas tsis muaj zog liab ntshav liab - uas tsis muaj zog los yog nkees, fainting spells, lightheadedness  ua pa nyuaj  thai hloov  npog qhov mob  Cov ntshav uas tsis muaj ntshav tsawg - qhov tshuaj no yuav ua rachelle kom cov ntshav dawb hlwb, liab cov hlwb thiab platelets. Debra zaum koj yuav raug ntau zog uas yuav kis tau tus kab mob thiab los ntshav.  xeev siab thiab ntuav  mob,  o, liab liab los yog irritation ntawm qhov chaw txhaj tshuaj  hnov mob, tingling, tooj hauv ob txhais golden los yog taw  teeb meem nrog tshuav nyiaj muna hudson, dianag  zis    Fluorouracil        (flure oh YOOR a bernice)    Trade Name : Adrucil ® ,  Other Names: 5-fluorouracil , 5-FU    Fluorouracil is the generic name for the trade name drug Adrucil®. In some cases, health care professionals may use the trade name Adrucil® when referring to the generic drug name fluorouracil.    Drug type:    Fluorouracil is an anti-cancer (\"antineoplastic\" or \"cytotoxic\") chemotherapy drug. Fluorouracil is classified as an \"antimetabolite.\" (For more detail, see \"How Fluorouracil Works\" section below).    What Fluorouracil Is Used For:  Colon and rectal cancer  Anal  Breast cancer  Gastrointestinal cancers including: anal, esophageal, pancreas and gastric (stomach)  Head and neck cancer  Unknown primary (squamous cell)  Neuroendocrine tumors  Thymic cancers  Cervical cancer  Bladder cancer  Hepatobiliary cancers  Topical use (cream or solution) in basal cell cancer of the skin and actinic keratoses (-see document Fluorouracil ( cream))  Note: If a drug has been approved for one use, physicians may elect to use this same drug for other problems if they believe it may be helpful.    How Fluorouracil Is Given:  As an injection into the vein (intravenous or IV), or as an infusion. The amount of time and schedule of infusion varies depending on a specific protocol, it may be given over several hours to several weeks.  As a topical ointment, a thin coating is applied to the affected skin lesions twice a day, treatment may continue over several weeks. (see document Fluorouracil(cream)).  The amount of Fluorouracil that you will receive depends on many factors, including your height and weight, your general health or other health problems, and the type of cancer or condition being treated. Your doctor will determine your dose and schedule.    Side  Effects:  Important things to remember about the side effects of Fluorouracil:    Most people do not experience all of the side effects of Fluorouracil listed.  Fluorouracil side effects are often predictable in terms of their onset duration and severity.  Fluorouracil side effects will improve after therapy is complete  Fluorouracil side effects may be quite manageable. There are many options to minimize or prevent the side effects of Fluorouracil.  The following side effects are common (occurring in greater than 30%) for patients taking Fluorouracil:    Diarrhea  Nausea and possible occasional vomiting  Mouth sores  Poor appetite  Watery eyes, sensitivity to light (photophobia)  Taste changes, metallic taste in mouth during infusion  Discoloration along vein through which the medication is given  Low blood counts Your white and red blood cells and platelets may temporarily decrease. This can put you at increased risk for infection, anemia and/or bleeding.  Jt: Meaning low point, jt is the point in time between chemotherapy cycles in which you experience low blood counts.    Onset: 7-10 days  Jt: 9-14 days  Recovery: 21-28 days    These side effects are less common side effects (occurring in about 10-29%) of patients receiving Fluorouracil:    Skin reactions : Dry, cracking, peeling skin. Darkening of the skin (hyperpigmentation), darkening of the skin where previous radiation treatment has been given (radiation recall).  Hair thinning  Nail changes - discoloration, loss of nails (rare)(see skin reactions).  Hand -foot syndrome (Palmar-plantar erythrodysesthesia or PPE) -skin rash, swelling, redness, pain and/or peeling of the skin on the palms of hands and soles of feet. Usually mild, starting 5-6 weeks after start of treatment. May require reductions in the dose of the medication.  Serious adverse reactions to Fluorouracil are; chest pain, EKG changes and increases in cardiac enzymes - which may indicate  problems with the heart (see Cardiovascular events). These symptoms are very rare but increased for patients with a prior history of heart disease.    Not all side effects are listed above. Some that are rare--occurring in less than 10% of patients-- are not listed here. However, you should always inform your health care provider if you experience any unusual symptoms.    When to contact your doctor or health care provider:  Contact your health care provider immediately, day or night, if you should experience any of the following symptoms:    Fever of 100.4° F (38° C) or higher, chills (possible signs of infection)  The following symptoms require medical attention, but are not an emergency. Contact your health care provider within 24 hours of noticing any of the following:    Nausea (interferes with ability to eat and unrelieved with prescribed medication)  Vomiting (vomiting more than 4-5 times in a 24 hour period)  Diarrhea (4-6 episodes in a 24-hour period) despite anti-diarrhea medication and diet alterations.  Unusual bleeding or bruising  Black or tarry stools, or blood in your stools  Blood in the urine  Extreme fatigue (unable to carry on self-care activities)  Mouth sores (painful redness, swelling or ulcers)  Tingling or burning, redness, swelling of the palms of the hands or soles of feet  Always inform your health care provider if you experience any unusual symptoms.    Precautions:  Before starting Fluorouracil treatment, make sure you tell your doctor about any other medications you are taking (including prescription, over-the-counter, vitamins, herbal remedies, etc.). Do not take aspirin or products containing aspirin unless your doctor specifically permits this.  Do not receive any kind of immunization or vaccination without your doctor's approval while taking Fluorouracil. Inform your health care professional if you are pregnant or may be pregnant prior to starting this treatment. Pregnancy category  D (Fluorouracil may be hazardous to the fetus. Women who are pregnant or become pregnant must be advised of the potential hazard to the fetus).  For both men and women: Use contraceptives, and do not conceive a child (get pregnant) while taking Fluorouracil. Barrier methods of contraception, such as condoms, are recommended.  Do not breast feed while taking Fluorouracil.  Self-Care Tips:  Use of ice chips in the mouth 10-15 minutes before and after IV injections of Fluorouracil may reduce the incidence and severity of mouth sores.  To help treat/prevent mouth sores, use a soft toothbrush, and rinse three times a day with 1/2 to 1 teaspoon of baking soda and/or 1/2 to 1 teaspoon of salt mixed with 8 ounces of water.  Drink at least two to three quarts of fluid every 24 hours, unless you are instructed otherwise.  You may be at risk of infection so try to avoid crowds or people with colds and/or not feeling well, and report fever or any other signs of infection immediately to your health care provider.  Follow regimen of anti-diarrhea medication as prescribed by your health care professional.  Eat foods that may help reduce diarrhea (see managing side effects - diarrhea).  Avoid sun exposure. Wear SPF 30 (or higher) sunblock and protective clothing.  In general, drinking alcoholic beverages should be kept to a minimum or avoided completely. You should discuss this with your doctor.  Get plenty of rest.  Maintain good nutrition.  Remain as active as you are able. Gentle exercise is encouraged, such as a daily walk.  If you experience symptoms or side effects, be sure to discuss them with your health care team. They can prescribe medications and/or offer other suggestions that are effective in managing such problems.  Prevention of hand-foot syndrome. You may need to modify normal activities of daily living to reduce friction and heat exposure to hands and feet, for about a week after treatment. (for more information  see - ( Managing side effects: hand foot syndrome).  Keep palms of hands and soles of feet moist using emollients.  You may experience drowsiness or dizziness; avoid driving or engaging in tasks that require alertness until your response to the drug is known.  Monitoring and Testing:  You will be checked regularly by your health care professional while you are taking Fluorouracil, to monitor side effects and check your response to therapy. Periodic blood work will be obtained to monitor your complete blood count (CBC) as well as tests to monitor the function of other organs (such as your kidneys and liver)    How Fluorouracil Works:  Cancerous tumors are characterized by cell division, which is no longer controlled as it is in normal tissue. \"Normal\" cells stop dividing when they come into contact with like cells, a mechanism known as contact inhibition. Cancerous cells lose this ability. Cancer cells no longer have the normal checks and balances in place that control and limit cell division. The process of cell division, whether normal or cancerous, occurs through the cell cycle. The cell cycle goes from the resting phase, through active growing phases, and then to mitosis (division).    The ability of chemotherapy to kill cancer cells depends on its ability to halt cell division. Usually, the drugs work by damaging the RNA or DNA that tells the cell how to copy itself in division. If the cells are unable to divide, they die. The faster the cells are dividing, the more likely it is that chemotherapy will kill the cells, causing the tumor to shrink. They also induce cell suicide (self-death or apoptosis).    Chemotherapy drugs that affect cells only when they are dividing are called cell-cycle specific. Chemotherapy drugs that affect cells when they are at rest are called cell-cycle non-specific. The scheduling of chemotherapy is set based on the type of cells, rate at which they divide, and the time at which a  given drug is likely to be effective. This is why chemotherapy is typically given in cycles.    Chemotherapy is most effective at killing cells that are rapidly dividing. Unfortunately, chemotherapy does not know the difference between the cancerous cells and the normal cells. The \"normal\" cells will grow back and be healthy but in the meantime, side effects occur. The \"normal\" cells most commonly affected by chemotherapy are the blood cells, the cells in the mouth, stomach and bowel, and the hair follicles; resulting in low blood counts, mouth sores, nausea, diarrhea, and/or hair loss. Different drugs may affect different parts of the body.    Fluorouracil belongs to the category of chemotherapy called antimetabolites. Antimetabolites are very similar to normal substances within the cell. When the cells incorporate these substances into the cellular metabolism, they are unable to divide. Antimetabolites are cell-cycle specific. They attack cells at very specific phases in the cycle. Antimetabolites are classified according to the substances with which they interfere. Fluorouracil is classified as a pyrimidine analog because it interferes with DNA and RNA synthesis by mimicking the building blocks necessary for synthesis.    Note: We strongly encourage you to talk with your health care professional about your specific medical condition and treatments. The information contained in this website is meant to be helpful and educational, but is not a substitute for medical advice.      Fluorouracil yog siv rachelle:  Colon thiab kheesxaws kheesxaws ntawm ncauj tsev menyuam  qhov ncauj  Kabmob kheesxaws ntawm mis  Mob kheesxaws gastrointestinal muaj xws li: anal, esophageal, pancreas thiab gastric (mob plab)  Lub toshia ben thiab caj dab  Tsis paub (squamous hlwb)  Neurondocrine tumors  Mob kheesxaws ncauj tsev menyuam  Mob kheesxaws ncauj tsev me nyuam  mob kheesxaws ntshav  Kab mob siab Hepatobilia  Lub npe siv (cov leeg los  yog kua) hauv qhov ncauj ntawm daim tawv nqaij thiab actinic keratoses (-saib daim ntawv Fluorouracil (cream) (cream))  Faj Seeb: Yog hais tias ib qho tshuaj twg uas tau pom zoo rachelle ib tug neeg, kws rudi mob yuav xaiv cov tshuaj no tib yam rachelle lwm cov teeb meem yog hais tias lawv ntseeg tias nws yuav pab tau.    Li hudson Fluorouracil yog muab:  Ua ib qho txhaj rachelle hauv cov hlab ntsha (intravenous los yog IV), los yog ua ib qho infusion. Qhov sij hawm thiab teem lub sij hawm infusion varies nyob ntawm ib qho raws tu qauv, nws yuav tau muab li ob peb teev rachelle ntau lub lis piam.  Raws li lub ntsiab larisa nyias, ib nyias nyias tsuas siv tau rachelle debra tawv nqaij tsawg ob zaug ib hnub twg, debra zaum yuav rudi li ob peb lub lis piam. (saib cov ntaub ntawv Fluorouracil(cream).  Qhov nyiaj ntawm Fluorouracil uas koj yuav tau txais nyob ntawm ntau yam, nrog rachelle koj qhov siab thiab luj, koj txoj thai mehnaz huv ntawm cev los yog lwm cov teebmeem thai mob nkeeg, thiab seb rnad mob cancer lossis mob cancer. Koj tus kws khomob sal li txiav txim isabell koj lub sij hawm thiab teem sij hawm.    Debra thai phiv los sis mary jo:  Tseem ceeb heev uas yuav tau nco ntsoov txog debra thai phiv los ntawm Fluorouracil:    Neeg feem coob tsis tsim tag nrho cov thai phiv los ntawm Fluorouracil teev.  Fluorouracil sab nraud feem ntau predictable saib raws lawv lub sij hawm thiab loj.  Fluorouracil sab yuav txhim rudi oleg qab txoj thai rudi yog tiav  Fluorouracil phiv los sis fluorouracil kuj yuav tswj tau. Muaj ntau txoj thai xaiv los minimize los yog tiv thaiv tau cov thai phiv los sis fluorouracil.  Cov thai phiv los sis mary jo no muaj tshwm sim ntau jaxon 30%) rachelle cov neeg noj Fluorouracil:    Zawv plab  Xeev siab thiab debra zaum yuav ntuav  qhov ncauj  Tsis qab los noj mov  Watery qhov muag, rhiab rachelle teeb (photophobia)  Nikunj hloov, metallic nikunj rachelle hauv lub qhov ncauj thaum infusion  Discoloration raws cov hlab ntsha los ntawm cov tshuaj noj yog muab  Cov ntshav uas tsis muaj  ntshav dawb thiab liab cov hlwb thiab cov platelets kuj yuav ua rachelle kom muaj ntshav. Qhov no yuav ua rachelle koj muaj feem raug tus kab mob no, anemia thiab/los ntshav.  Jt: Qab ben uas tsis muaj kis, jt yog tus taw golden rachelle lub sij hawm ntawm kws khomob cycles uas koj muaj cov ntshav uas koj muaj cov ntshav uas tsis muaj ntshav suav.    Onset: 7-10 hnub  Jt: 9-14 hnub  Rov ua hauj lwm: 21-28 hnub    Cov thai phiv los sis mary jo no tsis tshua pom muaj tshwm sim ntau (tshwm sim li ntawm 10-29%) Cov neeg mob tau Fluorouracil:    Debra tawv nqaij: qhuav, tawv nqaij, tawv nqaij. Debra tawv nqaij darkening ntawm daim tawv nqaij (hyperpigation), darkening ntawm daim tawv nqaij uas yav dhau los rudi tiv thaiv kab mob (hyperpigation).  plaub ben ntxhiab  Nail hloov - discoloration, poob rachelle golden taw (tsis tshua muaj).  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 Feem ntau, pib 5-6 lis piam oleg qab pib rudi. Debra zaum yuav tau txo kom tsis txhob ua debra yam tshuaj noj.  Cov thai tsis haum tshuaj loj rachelle Fluorouracil; Hauv siab, EKG Hloov thiab ua rachelle mob cardiac enzymes - uas debra zaum yuav qhia tau tias muaj teeb meem nrog lub plawv (saib Cardiovascular txheej xwm). Cov tsos mob no kuj muaj tsawg tsawg, tiam sis kuj muaj tsawg kawg nkaus rachelle cov neeg uas muaj keeb kwm muaj mob hauv plawv.    Tsis yog tag nrho cov thai phiv los saum griselda no. Ib txhia uas tsis tshua muaj-tshwm sim nyob rachelle tsawg tshaj 10% ntawm cov neeg mob--tsis teev nyob ntawm no. Tiam sis, koj yuav tsum qhia rachelle koj tus kws rudi mob yog tias koj muaj debra yam txawv.    Thaum twg koj hu rachelle koj tus kws rudi mob los sis tus kws rudi mob:  Hu rachelle koj tus kws rudi mob carpenter sim ntawd, ib hnub los yog hmo ntuj, yog hais tias koj yuav tsum tau muaj debra yam li hauv qab no:    Ua npaws kub txog 100.4° F (38° C) los yog ntau jaxon, ua daus no (debra zaum kub  cev)    Cov tsos mob ntawm tus mob no yuav tsum tau mus ntsib kws rudi mob, tab sis tsis yog ib qho xwm txheej ceev. Hu rachelle koj lub tsev rudi mob li ntawm 24 teev ntawm daim ntawv ceeb toom raws li nram qab no:    Xeev siab (cuam tshuam muaj peev xwm noj thiab unrelieved tshuaj)  Ntuav (ntuav tshaj 4-5 zaug hauv ib lub caij nyoog 24 teev)  Zawv plab (4-6 rov hauv ib lub sij hawm 24-teev) jaxon li cov tshuaj raws plab thiab noj tshuaj.  Ntshav los ntshav los yog nqaij npuas  Ntshav dub los yog tarry stools, los yog ntshav hauv koj cov quav  Ntshav hauv zis  Huab charlene fatigue (tsis muaj peev xwm nqa tau thai pab rachelle yus tus kheej)  Qhov ncauj (mob redness, o o los sis ulcers)  Tingling los yog hlawv, liab, o, o ntawm lub xibtes ntawm txhais golden los yog ko taw  Nco ntsoov qhia rachelle koj tus kws rudi mob yog tias koj muaj debra yam txawv txawv.    Thai ceev faj:  Ua ntej yuav pib rudi Fluorouracil, xyuas kom koj qhia koj tus kws rudi mob txog lwm cov tshuaj uas koj noj (nrog rachelle cov tshuaj uas niaj hnub noj mov, qhov counter-counter, vitamins, herbal remedies, etc.). Tsis txhob noj cov tshuaj aspirin lossis khoom uas muaj aspirin tsuas yog koj tus kws rudi mob tshwj xeeb no.  Tsis txhob txais txhua rand tshuaj txhaj lossis txhaj tshuaj tsis tau yog tias koj tus kws rudi mob  kwesi thaum uas noj Fluorouracil. Qhia rachelle koj tus kws rudi mob yog tias koj cev xeeb tub los yog cev xeeb tub ua ntej yuav pib noj tshuaj. Cev xeeb tub qeb D (Fluorouracil yuav tau hazardous rachelle tus me nyuam hauv plab. Cov poj niam cev xeeb tub los yog cev xeeb tub yuav tsum tau yug tus me nyuam hauv plab yuav tsum tau paub txog tus me nyuam hauv plab.  Rachelle cov txiv neej thiab cov poj niam: Siv contraceptives, thiab tsis txhob xeeb tub (tau me nyuam) thaum uas noj Fluorouracil. Barrier txoj thai contraception, xws li cov hnab rigoberto looj, yuav tau pom zoo.  Tsis txhob pub mis thaum noj Fluorouracil.  Self-Care Lub Tswv Sluly:  Siv cov faina khov hauv qhov ncauj 10-15 feeb  ua ntej thiab oleg qab IV injections ntawm Fluorouracil yuav txo tau qhov xwm thiab qhov ncauj ntawm qhov ncauj qhov ntswg.  Pab rudi qhov ncauj/ tiv thaiv qhov ncauj, siv ib tug txhuam hniav soft txhuam hniav, thiab yaug peb zaug ib hnub twg uas muaj 1/2 mus rachelle 1 diav rawg dej.  Haus tsawg kawg yog 2 mus rachelle 3 quarts kua txhua txhua 24 teev, tsuas yog koj qhia lwm yam no xwb.  Debra zaum koj yuav raug tus kab mob crowds los yog cov neeg uas mob khaub thuas thiab/los yog tsis hnov zoo, thiab qhia txog lwm yam mob uas carpenter sim ntawd rachelle koj lub tsev rudi mob deb.  Ua raws nraim li cov tshuaj tiv thaiv los tiv thaiv thai rawsplab los ntawm koj tus kws rudi mob.  Noj zaub mov uas yuav pab kom raws plab (saib debra thai phiv los sis mary jo - zawv plab).  Tsis txhob raug tshav ntuj kub. Hnav khaub ncaws 30 (los yog ntau jaxon) sunblock thiab tiv thaiv hnav khaub ncaws.  Yuav tsum tsis txhob muab cov faina caw kom tsawg kawg nkaus los yog tsis txhob micaela siab tias yuav tsum tsis txhob noj yam tsawg kawg nkaus. Koj yuav tau nrog koj tus kws rudi mob eloina.  So kom txaus.  Tswj thai noj haus kom zoo.  Nyob twj ywm li thaum koj muaj peev xwm ua tau. Rajat sal ua kom ib ce muaj zog yog thov nej tsam eric, xws li ib hnub mus taug thai.  Yog koj muaj cov tsos mob los yog debra thai phiv los sis debra thai mob nkeeg, nco ntsoov nrog koj lub tsev rudi mob/kws rudi mob eloina. Lawv yuav muab tshuaj thiab/los yog muab lwm cov tswv paloma uas yuav tswj debra teeb meem zoo li no.  Thai tiv thaiv ntawm golden taw syndrome. Debra zaum koj yuav tau hloov debra yam uas yuav tau hloov debra yam uas ua rachelle koj niaj hnub nyob txhua hnub thiab tshav kub kub thiab ko taw, txog li ib lub limtiam twg oleg qab rudi. (yog xav paub ntxiv - Managing debra thai phiv los sis foot syndrome).  Nco ntsoov golden kom huv si thiab golden taw uas siv cov emollients.  Debra zaum koj kuj yuav pom debra qhov nqaij ntuag lossis kiv toshia ben; Tsis txhob tsav tsheb los yog pib ua hauj lwm uas yuav tsum tau alertness mus txog  rachelle thaum koj teb cov tshuaj no.  Xyuas thiab Thai Ntsuam Xyuas:  Yuav ntsuam xyuas koj txoj thai noj qab haus huv thaum koj noj Fluorouracil, xyuas sab nrauv thiab ntsuam xyuas koj cov larisa teb rachelle txoj thai rudi mob. Yuav tau soj ntsuam xyuas koj cov ntshav (CBC) kom paub tias koj cov ntshav (CBC) li thai kuaj xyuas txog lwm yam kabmob (xws li koj ob lub raum thiab daim siab)    How Fluorouracil Works:  Crous tumors muaj cim ntawm golden division, uas yog tsis muaj tshuaj tswj raws li nws yog nyob rachelle hauv debra ntaub so ntswg. \"Dab tsi\" hlwb tsis faib thaum lawv tuaj mus kov tau xws li hlwb, ib mechanism hu ua inhibition. Crous hells poob qhov no muaj peev xwm ua tau. Mob kheesxaws ntawm hlwb tsis muaj cov tshev thiab tshuav nyiaj li hudson nyob rachelle hauv qhov chaw uas tswj thiab txwv hlwb faib. Tus txheej txheem ntawm golden division, isabell yuav ua li hudson los yog khaub ncaws, tshwm sim los ntawm lub voj voog. Lub cellcle mus los ntawm cov theem so, los ntawm thai loj hlob theem, thiab eric mus mitosis (faib).    Muaj peev xwm ntawm cov kws khomob yuav tua tau tus kabmob hlwb nyob ntawm nws muaj peev xwm nres hell division. Feem ntau, cov tshuaj ua haujlwm los ntawm thai ua haujlwm rachelle RNA los yog DNA uas qhia rachelle tus xovtooj ntawm golden ua nws tus kheej hauv faib. Yog hais tias tus hniav tsis muaj peev xwm faib, lawv tuag. Lub hlwb no faviding, yuav yog tias kws khomob yuav tua tau tus hlwb, ua rachelle cov pob txha rachelle shrink. Lawv kuj ua yus tua yus tua yus (self-tuag los yog apoptosis).    Kws khomob uas raug hlwb tsuas yog thaum lawv tseem faib cell-cycle specific. Kws khomob uas raug hlwb Tsuas yog thaum lawv faib cov tshuaj hu ua cell-cycle specific. Kws khomob uas raug hlwb thaum lawv nyob so hu ua cell-cycle uas tsis yog-specific. Lub sij hawm ntawm kws khomob kws khomob yog muab raws li cov hlwb, tus nqi rachelle cov hlwb uas lawv faib, thiab lub sij hawm uas muab cov tshuaj no debra zaum yuav zoo. Qhov no yog vim li hudson cov kws khomob  yuav muab rachelle cycles.    Kws khomob yog cov uas yuav tua tau cov hlwb uas ceev ceev faj. Tu siab kws khomob tsis paub qhov txawv ntawm lub hlwb crouss thiab lub hlwb. Lub hlwb yuav loj hlob rov qab thiab noj qab nyob zoo, tiam sis hauv lub meantime, debra thai phiv los sis mary jo. Cov hlwb ntswg\" hlwb feem ntau yog cov ntshav txhaws, hlwb hauv lub qhov ncauj, plab thiab bowel, thiab cov plaub ben follicles; Ua rachelle muaj ntshav tsawg, qhov ncauj, xeev siab, raws plab, thiab/lossis plaub ben. Txawv cov tshuaj yuav txawv rachelle qhov chaw hauv lub cev.    Fluorouracil belongs rachelle lub qeb ntawm kws khomob hu ua antimetabolites. Antimetabolites yog ib yam khoom zoo li debra yam hauv lub hlwb. Thaum lub hlwb incorporate no rachelle hauv lub cellular metabolism, lawv muaj peev xwm faib tsis tau. Antimetabolites yog cell-cycle specific. Lawv nres hnov ntawm debra theem hauv lub voj. Antimetabolites yog txaus siab raws li debra yam khoom uas lawv cuam tshuam. Fluorouracil yog txwv kom muab zais ua ib pyrimidine analog vim hais tias nws interferes nrog DNA thiab RNA synthesis los mimicking lub tsev blocks tsim nyog rachelle synthesis.    Faj Seeb: Peb xav kom koj nrog koj eloina nrog koj tus kws rudi mob eloina txog koj tus mob thiab thai rudi mob. Cov ntaub ntawv muaj nyob rachelle hauv lub website no yog meant pab thiab thai kawm ntawv, tiam sis tsis yog ib lub tswv paloma pab rachelle thai rudi mob.

## 2024-12-12 ENCOUNTER — HOSPITAL ENCOUNTER (OUTPATIENT)
Age: 74
Setting detail: OBSERVATION
Discharge: HOME | End: 2024-12-14
Attending: STUDENT IN AN ORGANIZED HEALTH CARE EDUCATION/TRAINING PROGRAM | Admitting: STUDENT IN AN ORGANIZED HEALTH CARE EDUCATION/TRAINING PROGRAM
Payer: MEDICARE

## 2024-12-12 DIAGNOSIS — R07.9 CHEST PAIN, UNSPECIFIED TYPE: Primary | ICD-10-CM

## 2024-12-12 DIAGNOSIS — Z00.6 ENCOUNTER FOR EXAMINATION FOR NORMAL COMPARISON OR CONTROL IN CLINICAL RESEARCH PROGRAM: ICD-10-CM

## 2024-12-12 PROBLEM — R41.82 AMS (ALTERED MENTAL STATUS): Status: ACTIVE | Noted: 2024-12-12

## 2024-12-12 LAB
ANION GAP SERPL CALC-SCNC: 9 MEQ/L (ref 8–16)
BACTERIA URNS QL MICRO: ABNORMAL /HPF
BASOPHILS ABSOLUTE: 0 THOU/MM3 (ref 0–0.1)
BASOPHILS NFR BLD AUTO: 0.2 %
BILIRUB UR QL STRIP.AUTO: NEGATIVE
BUN SERPL-MCNC: 16 MG/DL (ref 7–22)
CALCIUM SERPL-MCNC: 9.9 MG/DL (ref 8.5–10.5)
CASTS #/AREA URNS LPF: ABNORMAL /LPF
CASTS 2: ABNORMAL /LPF
CHARACTER UR: CLEAR
CHLORIDE SERPL-SCNC: 97 MEQ/L (ref 98–111)
CO2 SERPL-SCNC: 31 MEQ/L (ref 23–33)
COLOR, UA: YELLOW
CREAT SERPL-MCNC: 1 MG/DL (ref 0.4–1.2)
CRYSTALS URNS MICRO: ABNORMAL
DEPRECATED RDW RBC AUTO: 42.9 FL (ref 35–45)
EKG ATRIAL RATE: 72 BPM
EKG P AXIS: 36 DEGREES
EKG P-R INTERVAL: 172 MS
EKG Q-T INTERVAL: 422 MS
EKG QRS DURATION: 130 MS
EKG QTC CALCULATION (BAZETT): 462 MS
EKG R AXIS: 8 DEGREES
EKG T AXIS: -170 DEGREES
EKG VENTRICULAR RATE: 72 BPM
EOSINOPHIL NFR BLD AUTO: 1.9 %
EOSINOPHILS ABSOLUTE: 0.1 THOU/MM3 (ref 0–0.4)
EPITHELIAL CELLS, UA: ABNORMAL /HPF
ERYTHROCYTE [DISTWIDTH] IN BLOOD BY AUTOMATED COUNT: 12.6 % (ref 11.5–14.5)
GFR SERPL CREATININE-BSD FRML MDRD: 59 ML/MIN/1.73M2
GLUCOSE BLD STRIP.AUTO-MCNC: 243 MG/DL (ref 70–108)
GLUCOSE BLD STRIP.AUTO-MCNC: 256 MG/DL (ref 70–108)
GLUCOSE BLD STRIP.AUTO-MCNC: 265 MG/DL (ref 70–108)
GLUCOSE BLD STRIP.AUTO-MCNC: 347 MG/DL (ref 70–108)
GLUCOSE BLD STRIP.AUTO-MCNC: 358 MG/DL (ref 70–108)
GLUCOSE BLD STRIP.AUTO-MCNC: 368 MG/DL (ref 70–108)
GLUCOSE SERPL-MCNC: 258 MG/DL (ref 70–108)
GLUCOSE UR QL STRIP.AUTO: >= 1000 MG/DL
HCT VFR BLD AUTO: 41.4 % (ref 37–47)
HGB BLD-MCNC: 13.8 GM/DL (ref 12–16)
HGB UR QL STRIP.AUTO: NEGATIVE
IMM GRANULOCYTES # BLD AUTO: 0.02 THOU/MM3 (ref 0–0.07)
IMM GRANULOCYTES NFR BLD AUTO: 0.5 %
KETONES UR QL STRIP.AUTO: NEGATIVE
LYMPHOCYTES ABSOLUTE: 1.5 THOU/MM3 (ref 1–4.8)
LYMPHOCYTES NFR BLD AUTO: 36.8 %
MCH RBC QN AUTO: 30.8 PG (ref 26–33)
MCHC RBC AUTO-ENTMCNC: 33.3 GM/DL (ref 32.2–35.5)
MCV RBC AUTO: 92.4 FL (ref 81–99)
MISCELLANEOUS 2: ABNORMAL
MONOCYTES ABSOLUTE: 0.4 THOU/MM3 (ref 0.4–1.3)
MONOCYTES NFR BLD AUTO: 9.2 %
NEUTROPHILS ABSOLUTE: 2.1 THOU/MM3 (ref 1.8–7.7)
NEUTROPHILS NFR BLD AUTO: 51.4 %
NITRITE UR QL STRIP: NEGATIVE
NRBC BLD AUTO-RTO: 0 /100 WBC
PH UR STRIP.AUTO: 6.5 [PH] (ref 5–9)
PLATELET # BLD AUTO: 95 THOU/MM3 (ref 130–400)
PMV BLD AUTO: 12.2 FL (ref 9.4–12.4)
POTASSIUM SERPL-SCNC: 4.1 MEQ/L (ref 3.5–5.2)
PROT UR STRIP.AUTO-MCNC: NEGATIVE MG/DL
RBC # BLD AUTO: 4.48 MILL/MM3 (ref 4.2–5.4)
RBC URINE: ABNORMAL /HPF
RENAL EPI CELLS #/AREA URNS HPF: ABNORMAL /[HPF]
SODIUM SERPL-SCNC: 137 MEQ/L (ref 135–145)
SP GR UR REFRACT.AUTO: 1.01 (ref 1–1.03)
TROPONIN, HIGH SENSITIVITY: 20 NG/L (ref 0–12)
TROPONIN, HIGH SENSITIVITY: 23 NG/L (ref 0–12)
TSH SERPL DL<=0.005 MIU/L-ACNC: 1.47 UIU/ML (ref 0.4–4.2)
UROBILINOGEN, URINE: 0.2 EU/DL (ref 0–1)
WBC # BLD AUTO: 4.1 THOU/MM3 (ref 4.8–10.8)
WBC #/AREA URNS HPF: ABNORMAL /HPF
WBC #/AREA URNS HPF: ABNORMAL /[HPF]
YEAST LIKE FUNGI URNS QL MICRO: ABNORMAL

## 2024-12-12 PROCEDURE — 36415 COLL VENOUS BLD VENIPUNCTURE: CPT

## 2024-12-12 PROCEDURE — 96372 THER/PROPH/DIAG INJ SC/IM: CPT

## 2024-12-12 PROCEDURE — 2580000003 HC RX 258: Performed by: HOSPITALIST

## 2024-12-12 PROCEDURE — 82948 REAGENT STRIP/BLOOD GLUCOSE: CPT

## 2024-12-12 PROCEDURE — G0378 HOSPITAL OBSERVATION PER HR: HCPCS

## 2024-12-12 PROCEDURE — 6360000002 HC RX W HCPCS: Performed by: HOSPITALIST

## 2024-12-12 PROCEDURE — 93005 ELECTROCARDIOGRAM TRACING: CPT | Performed by: HOSPITALIST

## 2024-12-12 PROCEDURE — 85025 COMPLETE CBC W/AUTO DIFF WBC: CPT

## 2024-12-12 PROCEDURE — 6370000000 HC RX 637 (ALT 250 FOR IP)

## 2024-12-12 PROCEDURE — 92610 EVALUATE SWALLOWING FUNCTION: CPT

## 2024-12-12 PROCEDURE — G0379 DIRECT REFER HOSPITAL OBSERV: HCPCS

## 2024-12-12 PROCEDURE — 84484 ASSAY OF TROPONIN QUANT: CPT

## 2024-12-12 PROCEDURE — 81001 URINALYSIS AUTO W/SCOPE: CPT

## 2024-12-12 PROCEDURE — 84443 ASSAY THYROID STIM HORMONE: CPT

## 2024-12-12 PROCEDURE — 6370000000 HC RX 637 (ALT 250 FOR IP): Performed by: HOSPITALIST

## 2024-12-12 PROCEDURE — 96374 THER/PROPH/DIAG INJ IV PUSH: CPT

## 2024-12-12 PROCEDURE — 93010 ELECTROCARDIOGRAM REPORT: CPT | Performed by: NUCLEAR MEDICINE

## 2024-12-12 PROCEDURE — 99223 1ST HOSP IP/OBS HIGH 75: CPT | Performed by: HOSPITALIST

## 2024-12-12 PROCEDURE — 80048 BASIC METABOLIC PNL TOTAL CA: CPT

## 2024-12-12 RX ORDER — GABAPENTIN 300 MG/1
300 CAPSULE ORAL DAILY
Status: DISCONTINUED | OUTPATIENT
Start: 2024-12-12 | End: 2024-12-14 | Stop reason: HOSPADM

## 2024-12-12 RX ORDER — MEMANTINE HYDROCHLORIDE 5 MG/1
5 TABLET ORAL 2 TIMES DAILY
Status: DISCONTINUED | OUTPATIENT
Start: 2024-12-12 | End: 2024-12-14 | Stop reason: HOSPADM

## 2024-12-12 RX ORDER — ACETAMINOPHEN 650 MG/1
650 SUPPOSITORY RECTAL EVERY 6 HOURS PRN
Status: DISCONTINUED | OUTPATIENT
Start: 2024-12-12 | End: 2024-12-14 | Stop reason: HOSPADM

## 2024-12-12 RX ORDER — POLYETHYLENE GLYCOL 3350 17 G/17G
17 POWDER, FOR SOLUTION ORAL DAILY PRN
Status: DISCONTINUED | OUTPATIENT
Start: 2024-12-12 | End: 2024-12-14 | Stop reason: HOSPADM

## 2024-12-12 RX ORDER — VITAMIN B COMPLEX
2000 TABLET ORAL DAILY
Status: DISCONTINUED | OUTPATIENT
Start: 2024-12-12 | End: 2024-12-14 | Stop reason: HOSPADM

## 2024-12-12 RX ORDER — ISOSORBIDE MONONITRATE 60 MG/1
60 TABLET, EXTENDED RELEASE ORAL DAILY
Status: DISCONTINUED | OUTPATIENT
Start: 2024-12-12 | End: 2024-12-14 | Stop reason: HOSPADM

## 2024-12-12 RX ORDER — SODIUM CHLORIDE 0.9 % (FLUSH) 0.9 %
5-40 SYRINGE (ML) INJECTION EVERY 12 HOURS SCHEDULED
Status: DISCONTINUED | OUTPATIENT
Start: 2024-12-12 | End: 2024-12-14 | Stop reason: HOSPADM

## 2024-12-12 RX ORDER — MAGNESIUM SULFATE IN WATER 40 MG/ML
2000 INJECTION, SOLUTION INTRAVENOUS PRN
Status: DISCONTINUED | OUTPATIENT
Start: 2024-12-12 | End: 2024-12-14 | Stop reason: HOSPADM

## 2024-12-12 RX ORDER — MIDODRINE HYDROCHLORIDE 2.5 MG/1
2.5 TABLET ORAL 3 TIMES DAILY
Status: DISCONTINUED | OUTPATIENT
Start: 2024-12-12 | End: 2024-12-14 | Stop reason: HOSPADM

## 2024-12-12 RX ORDER — FAMOTIDINE 20 MG/1
40 TABLET, FILM COATED ORAL DAILY
Status: DISCONTINUED | OUTPATIENT
Start: 2024-12-12 | End: 2024-12-12

## 2024-12-12 RX ORDER — DULOXETIN HYDROCHLORIDE 60 MG/1
60 CAPSULE, DELAYED RELEASE ORAL DAILY
Status: DISCONTINUED | OUTPATIENT
Start: 2024-12-12 | End: 2024-12-14 | Stop reason: HOSPADM

## 2024-12-12 RX ORDER — CETIRIZINE HYDROCHLORIDE 10 MG/1
10 TABLET ORAL DAILY
Status: DISCONTINUED | OUTPATIENT
Start: 2024-12-12 | End: 2024-12-14 | Stop reason: HOSPADM

## 2024-12-12 RX ORDER — PANTOPRAZOLE SODIUM 40 MG/1
40 TABLET, DELAYED RELEASE ORAL DAILY
Status: DISCONTINUED | OUTPATIENT
Start: 2024-12-12 | End: 2024-12-14 | Stop reason: HOSPADM

## 2024-12-12 RX ORDER — DOCUSATE SODIUM 100 MG/1
100 CAPSULE, LIQUID FILLED ORAL 2 TIMES DAILY PRN
Status: DISCONTINUED | OUTPATIENT
Start: 2024-12-12 | End: 2024-12-14 | Stop reason: HOSPADM

## 2024-12-12 RX ORDER — ONDANSETRON 2 MG/ML
4 INJECTION INTRAMUSCULAR; INTRAVENOUS EVERY 6 HOURS PRN
Status: DISCONTINUED | OUTPATIENT
Start: 2024-12-12 | End: 2024-12-14 | Stop reason: HOSPADM

## 2024-12-12 RX ORDER — MONTELUKAST SODIUM 10 MG/1
10 TABLET ORAL NIGHTLY
Status: DISCONTINUED | OUTPATIENT
Start: 2024-12-12 | End: 2024-12-14 | Stop reason: HOSPADM

## 2024-12-12 RX ORDER — POTASSIUM CHLORIDE 7.45 MG/ML
10 INJECTION INTRAVENOUS PRN
Status: DISCONTINUED | OUTPATIENT
Start: 2024-12-12 | End: 2024-12-14 | Stop reason: HOSPADM

## 2024-12-12 RX ORDER — ONDANSETRON 4 MG/1
4 TABLET, ORALLY DISINTEGRATING ORAL EVERY 8 HOURS PRN
Status: DISCONTINUED | OUTPATIENT
Start: 2024-12-12 | End: 2024-12-14 | Stop reason: HOSPADM

## 2024-12-12 RX ORDER — FAMOTIDINE 20 MG/1
20 TABLET, FILM COATED ORAL DAILY
Status: DISCONTINUED | OUTPATIENT
Start: 2024-12-13 | End: 2024-12-14 | Stop reason: HOSPADM

## 2024-12-12 RX ORDER — ACETAMINOPHEN 325 MG/1
650 TABLET ORAL EVERY 6 HOURS PRN
Status: DISCONTINUED | OUTPATIENT
Start: 2024-12-12 | End: 2024-12-14 | Stop reason: HOSPADM

## 2024-12-12 RX ORDER — INSULIN GLARGINE 100 [IU]/ML
5 INJECTION, SOLUTION SUBCUTANEOUS DAILY
Status: DISCONTINUED | OUTPATIENT
Start: 2024-12-12 | End: 2024-12-14 | Stop reason: HOSPADM

## 2024-12-12 RX ORDER — ATORVASTATIN CALCIUM 20 MG/1
20 TABLET, FILM COATED ORAL NIGHTLY
Status: DISCONTINUED | OUTPATIENT
Start: 2024-12-12 | End: 2024-12-14 | Stop reason: HOSPADM

## 2024-12-12 RX ORDER — LEVOTHYROXINE SODIUM 88 UG/1
88 TABLET ORAL DAILY
Status: DISCONTINUED | OUTPATIENT
Start: 2024-12-12 | End: 2024-12-14 | Stop reason: HOSPADM

## 2024-12-12 RX ORDER — INSULIN LISPRO 100 [IU]/ML
0-8 INJECTION, SOLUTION INTRAVENOUS; SUBCUTANEOUS
Status: DISCONTINUED | OUTPATIENT
Start: 2024-12-12 | End: 2024-12-14 | Stop reason: HOSPADM

## 2024-12-12 RX ORDER — SODIUM CHLORIDE 9 MG/ML
INJECTION, SOLUTION INTRAVENOUS PRN
Status: DISCONTINUED | OUTPATIENT
Start: 2024-12-12 | End: 2024-12-14 | Stop reason: HOSPADM

## 2024-12-12 RX ORDER — ENOXAPARIN SODIUM 100 MG/ML
30 INJECTION SUBCUTANEOUS DAILY
Status: DISCONTINUED | OUTPATIENT
Start: 2024-12-12 | End: 2024-12-14 | Stop reason: HOSPADM

## 2024-12-12 RX ORDER — DEXTROSE MONOHYDRATE 100 MG/ML
INJECTION, SOLUTION INTRAVENOUS CONTINUOUS PRN
Status: DISCONTINUED | OUTPATIENT
Start: 2024-12-12 | End: 2024-12-14 | Stop reason: HOSPADM

## 2024-12-12 RX ORDER — GLUCAGON 1 MG/ML
1 KIT INJECTION PRN
Status: DISCONTINUED | OUTPATIENT
Start: 2024-12-12 | End: 2024-12-14 | Stop reason: HOSPADM

## 2024-12-12 RX ORDER — CLOPIDOGREL BISULFATE 75 MG/1
75 TABLET ORAL DAILY
Status: DISCONTINUED | OUTPATIENT
Start: 2024-12-12 | End: 2024-12-14 | Stop reason: HOSPADM

## 2024-12-12 RX ORDER — SODIUM CHLORIDE 0.9 % (FLUSH) 0.9 %
5-40 SYRINGE (ML) INJECTION PRN
Status: DISCONTINUED | OUTPATIENT
Start: 2024-12-12 | End: 2024-12-14 | Stop reason: HOSPADM

## 2024-12-12 RX ORDER — POTASSIUM CHLORIDE 1500 MG/1
40 TABLET, EXTENDED RELEASE ORAL PRN
Status: DISCONTINUED | OUTPATIENT
Start: 2024-12-12 | End: 2024-12-14 | Stop reason: HOSPADM

## 2024-12-12 RX ORDER — FERROUS SULFATE 325(65) MG
325 TABLET ORAL DAILY
Status: DISCONTINUED | OUTPATIENT
Start: 2024-12-12 | End: 2024-12-14 | Stop reason: HOSPADM

## 2024-12-12 RX ADMIN — MIDODRINE HYDROCHLORIDE 2.5 MG: 2.5 TABLET ORAL at 12:56

## 2024-12-12 RX ADMIN — SODIUM CHLORIDE, PRESERVATIVE FREE 10 ML: 5 INJECTION INTRAVENOUS at 09:55

## 2024-12-12 RX ADMIN — Medication 2000 UNITS: at 09:56

## 2024-12-12 RX ADMIN — MEMANTINE HYDROCHLORIDE 5 MG: 5 TABLET ORAL at 09:56

## 2024-12-12 RX ADMIN — ATORVASTATIN CALCIUM 20 MG: 20 TABLET, FILM COATED ORAL at 20:40

## 2024-12-12 RX ADMIN — FAMOTIDINE 40 MG: 20 TABLET, FILM COATED ORAL at 09:55

## 2024-12-12 RX ADMIN — SODIUM CHLORIDE, PRESERVATIVE FREE 10 ML: 5 INJECTION INTRAVENOUS at 20:40

## 2024-12-12 RX ADMIN — CLOPIDOGREL BISULFATE 75 MG: 75 TABLET ORAL at 09:55

## 2024-12-12 RX ADMIN — CETIRIZINE HYDROCHLORIDE 10 MG: 10 TABLET, FILM COATED ORAL at 09:56

## 2024-12-12 RX ADMIN — FERROUS SULFATE TAB 325 MG (65 MG ELEMENTAL FE) 325 MG: 325 (65 FE) TAB at 09:55

## 2024-12-12 RX ADMIN — WATER 1000 MG: 1 INJECTION INTRAMUSCULAR; INTRAVENOUS; SUBCUTANEOUS at 11:09

## 2024-12-12 RX ADMIN — ENOXAPARIN SODIUM 30 MG: 100 INJECTION SUBCUTANEOUS at 09:55

## 2024-12-12 RX ADMIN — LEVOTHYROXINE SODIUM 88 MCG: 0.09 TABLET ORAL at 09:56

## 2024-12-12 RX ADMIN — ISOSORBIDE MONONITRATE 60 MG: 60 TABLET, EXTENDED RELEASE ORAL at 09:56

## 2024-12-12 RX ADMIN — DULOXETINE HYDROCHLORIDE 60 MG: 60 CAPSULE, DELAYED RELEASE ORAL at 09:55

## 2024-12-12 RX ADMIN — MONTELUKAST 10 MG: 10 TABLET, FILM COATED ORAL at 20:40

## 2024-12-12 RX ADMIN — INSULIN GLARGINE 5 UNITS: 100 INJECTION, SOLUTION SUBCUTANEOUS at 11:08

## 2024-12-12 RX ADMIN — PANTOPRAZOLE SODIUM 40 MG: 40 TABLET, DELAYED RELEASE ORAL at 09:55

## 2024-12-12 RX ADMIN — GABAPENTIN 300 MG: 300 CAPSULE ORAL at 09:56

## 2024-12-12 RX ADMIN — MEMANTINE HYDROCHLORIDE 5 MG: 5 TABLET ORAL at 20:40

## 2024-12-12 RX ADMIN — INSULIN LISPRO 8 UNITS: 100 INJECTION, SOLUTION INTRAVENOUS; SUBCUTANEOUS at 21:52

## 2024-12-12 RX ADMIN — MIDODRINE HYDROCHLORIDE 2.5 MG: 2.5 TABLET ORAL at 20:40

## 2024-12-12 RX ADMIN — ACETAMINOPHEN 650 MG: 325 TABLET ORAL at 23:55

## 2024-12-12 ASSESSMENT — PAIN SCALES - GENERAL
PAINLEVEL_OUTOF10: 0
PAINLEVEL_OUTOF10: 3

## 2024-12-12 ASSESSMENT — PAIN DESCRIPTION - ORIENTATION: ORIENTATION: RIGHT;LEFT

## 2024-12-12 ASSESSMENT — PAIN DESCRIPTION - LOCATION: LOCATION: NECK

## 2024-12-12 ASSESSMENT — PAIN - FUNCTIONAL ASSESSMENT: PAIN_FUNCTIONAL_ASSESSMENT: ACTIVITIES ARE NOT PREVENTED

## 2024-12-12 ASSESSMENT — PAIN DESCRIPTION - DESCRIPTORS: DESCRIPTORS: ACHING

## 2024-12-12 NOTE — PROGRESS NOTES
[facility]  SPEECH THERAPY  STRZ MED SURG 8AB  Clinical Swallow Evaluation    Discharge Recommendations: Continue to Assess Pending Progress  DIET ORDER RECOMMENDATIONS AFTER EVALUATION: Regular and thin liquids  Strategies:  Full Upright Position and Small Bite/Sip     SLP Individual Minutes  Time In: 1510  Time Out: 1518  Minutes: 8  Timed Code Treatment Minutes: 0 Minutes       Date: 2024  Patient Name: Ina Cotter      CSN: [id number]   : 1950  (74 y.o.)  Gender: female   Referring Physician:  La Nena Ding MD    Diagnosis: AMS (altered mental status)    History of Present Illness/Injury: History obtained from the patients .     The patient is a 74 y.o. female who presents with complaints of worsening confusion.  Patient is accompanied by her  who is providing the history as patient has dementia and is poor historian.  He reports that last month she was admitted for a subarachnoid bleed after a fall at home.  From there patient went to the [facility] for 2 weeks and has been home now for 2 weeks.  He reports that she continues to slowly decline at home, states that she has become more confused started talking about her baby last night.  He denies any fevers or chills, nausea or vomiting.  He reports that she has a poor appetite at baseline.  He denies any urinary or stool incontinence.  Patient was taken to [facility] today for evaluation for confusion thought to have a possible UTI but had a CT head which showed some ventriculomegaly and sent here for eval.   also reports she was complaining of midsternal chest pain, this has since resolved    Past Medical History:   Diagnosis Date    Abnormal ultrasound cardiogram     large global hypokinesis EF35-40%    Anxiety and depression     CKD (chronic kidney disease)     Dementia (HCC)     GERD (gastroesophageal reflux disease)     Hyperlipidemia     Hypertension     Hypothyroidism     Neuropathy     Type II or unspecified  present following consumption of hard coarse solids; however, clearance achieved with prompted liquid wash. No overt s/s of aspiration or pulmonary decline evident at bedside; however, certainly cannot fully r/o without presence of supportive imaging, which is not clinically indicated at this time. Recommend patient consumt regular diet and thin liquids. No further skilled dysphagia interventions warranted. Should acute changes be noted, please re-consult ST interventions.     RECOMMENDATIONS/ASSESSMENT:  Instrumental Evaluation: Instrumental evaluation not indicated at this time.  Rehabilitation Potential: good    EDUCATION:  Learner: Patient and Significant Other  Education:  Reviewed results and recommendations of this evaluation, Reviewed diet and strategies, Reviewed signs, symptoms and risks of aspiration, Reviewed ST goals and Plan of Care, Reviewed recommendations for follow-up, and Education Related to Potential Risks and Complications Due to Impairment/Illness/Injury  Evaluation of Education: Verbalizes understanding and Demonstrates with assistance    PLAN:  No further speech therapy serviced indicated for: dysphagia interventions and Speech Therapy evaluation to assess speech, language, cognition and/or voice    PATIENT GOAL:    Did not state.  Will further assess during treatment.    SHORT TERM GOALS:  Short Term Goals  Time Frame for Short Term Goals: 2 weeks  Goal 1: Patient will complete cognitive linguistic assessment to aide in POC development    LONG TERM GOALS:  No LTGs established d/t short RYAN Cunningham MA, CCC-SLP [id number]

## 2024-12-12 NOTE — CARE COORDINATION
DISCHARGE PLANNING EVALUATION  12/12/24, 11:33 AM EST    Reason for Referral: discharge planning  Decision Maker: Patient's  makes decisions, patient has dementia  Current Services: [facility] ; nursing, PT & OT  New Services Requested: none  Family/ Social/ Home environment: Spoke with patient and .  Patient was recently released form [facility] and returned home with [facility] .  Patient has confusion, Alzheimer's dementia dx. Plan is to return home pending medical plan and progress.  Payment Source:[employer] Medicare and Medicaid   Transportation at Discharge:   Post-acute (PAC) provider list was provided to patient. Patient was informed of their freedom to choose PAC provider. Discussed and offered to show the patient the relevant PAC Providers quality and resource use measures on Medicare Compare web site via computer based on patient's goals of care and treatment preferences. Questions regarding selection process were answered.      Teach Back Method used with patient regarding care plan and discharge planning.  Patient verbalized understanding of the plan of care and contribute to goal setting.       Patient preferences and discharge plan: Patient plans home with  and resume [facility]  vs SNF pending medical plan and progress.    Electronically signed by DEANN Real on 12/12/2024 at 11:33 AM

## 2024-12-12 NOTE — CARE COORDINATION
Case Management Assessment Initial Evaluation    Date/Time of Evaluation: 2024 1:32 PM  Assessment Completed by: Brie Plummer RN    If patient is discharged prior to next notation, then this note serves as note for discharge by case management.    Patient Name: Ina Cotter                   YOB: 1950  Diagnosis: AMS (altered mental status) [R41.82]                   Date / Time: 2024  5:51 AM  Location: [address] [place] [address]     Patient Admission Status: Observation   Readmission Risk Low 0-14, Mod 15-19), High > 20: Readmission Risk Score: 11.6    Current PCP: Yeny Peralta MD  Health Care Decision Makers:   Primary Decision Maker: Nilton Cotter - Spouse - [phone]    Additional Case Management Notes: Admitted with AMS. Echo ordered. SLP. SW consulted. PT/OT. Hx dementia.    Procedures: No    Imagin24 CT Head/Brain  IMPRESSION:   There is minimal increase in ventricular volume with mild subtle ependymal flow.   This concerning for hydrocephalus. Ventriculogram may be of benefit versus lumbar puncture, if the patient has concern for NPH (normal pressure hydrocephalus).       Patient Goals/Plan/Treatment Preferences: From home with spouse and [facility]. SW following.

## 2024-12-12 NOTE — H&P
History & Physical    Patient:  Ina Cotter  YOB: 1950  Date of Service: 2024  MRN: [id number]   Acct:  [id number]   Primary Care Physician: Yeny Peralta MD    Chief Complaint: altered mental status    History of Present Illness:   History obtained from the patients .    The patient is a 74 y.o. female who presents with complaints of worsening confusion.  Patient is accompanied by her  who is providing the history as patient has dementia and is poor historian.  He reports that last month she was admitted for a subarachnoid bleed after a fall at home.  From there patient went to the [facility] for 2 weeks and has been home now for 2 weeks.  He reports that she continues to slowly decline at home, states that she has become more confused started talking about her baby last night.  He denies any fevers or chills, nausea or vomiting.  He reports that she has a poor appetite at baseline.  He denies any urinary or stool incontinence.  Patient was taken to [facility] today for evaluation for confusion thought to have a possible UTI but had a CT head which showed some ventriculomegaly and sent here for eval.   also reports she was complaining of midsternal chest pain, this has since resolved.      Past Medical History:        Diagnosis Date    Abnormal ultrasound cardiogram     large global hypokinesis EF35-40%    Anxiety and depression     CKD (chronic kidney disease)     Dementia (HCC)     GERD (gastroesophageal reflux disease)     Hyperlipidemia     Hypertension     Hypothyroidism     Neuropathy     Type II or unspecified type diabetes mellitus without mention of complication, not stated as uncontrolled     Diagnosed        Past Surgical History:        Procedure Laterality Date    CARDIOVASCULAR STRESS TEST  2013    WNL    CATARACT REMOVAL      bilat     SECTION  .    CHOLECYSTECTOMY  1980    NECK SURGERY  2011    VITRECTOMY  2009    Rt eye  ago which was negative despite reduced EF.  Will obtain serial troponins and 2D echo.  Weakness: Patient states she is still ambulating but requires some assistance with gait instability which he cannot quite confirm if this is new or old.  Will have PT/OT eval patient.  CT head done showed no further bleeding.  If patient's mentation does not improve can consider an MRI brain.  History of dementia: Continue with Namenda  Hypotension: Per  patient does require midodrine for low blood pressures which can contribute to her falls because she becomes dehydrated quite easily.  Insulin-dependent diabetes: Continue with Lantus and sliding scale  Hypothyroidism: Continue Synthroid  History of systolic heart failure: Does not appear to be in acute volume overload most recent EF is 35 to 40%.  Advance care planning: Palliative care consult  DVT prophylaxis: Lovenox      DVT prophylaxis: [] Lovenox                                 [] SCDs                                 [] SQ Heparin                                 [] Encourage ambulation, low risk for DVT, no chemical or mechanical prophylaxis necessary              [] Already on Anticoagulation                Anticipated Disposition upon discharge: [] Home                                                                         [] Home with Home Health                                                                         [] Skilled Nursing Facility                                                                         [] [facility]          Electronically signed by La Nena Ding MD on 12/12/2024 at 11:38 AM

## 2024-12-12 NOTE — PROGRESS NOTES
Renal Adjustment Follow-up         Recent Labs     12/12/24  0843   BUN 16   CREATININE 1.0      eGFR:       Recent Labs     12/12/24  0843   LABGLOM 59*      Estimated Creatinine Clearance: 35 mL/min (based on SCr of 1 mg/dL).     Plan: changed famotidine 40mg QD to famotidine 20mg QD     Jonathan Hampton, [facility]

## 2024-12-12 NOTE — PROGRESS NOTES
Transfer  Report from Facility [facility]  Referring Physician Michelle BEGUM  Accepting Physician Aleyda  Patient Condition:  [birth year]. 73 yo at [facility] presents to ER with c/o AMS. She was recently here after a fall and a subarachnoid bleed. Her  noticed increasing confusion over the past 3 days. She does have some dementia but is now not remembering things and trying to go outside without him which she normally doesn't do. CT of the head shows increased ventricular volume. She did also experience some midsternal chest pain in the ER which her  states she has periodically and they treat with Nitro at home. She was given 1 Nitro in the ER. She does have a UTI with urine (-) for nitrites, small leukocyte esterase, WBC 30. Other labs WBC 4.1, creat 1.25, BUN 19, K 4, Na 135, Ammonia 30, trop was 13, recheck 10, lactic 1.9. EKG shows NSR, CXR (-). She was treated with 1 Nitro and 1 gm of Rocephin. Vitals 98.2 HR 73, resp 14, b/p 151/77, 97% on RA.    Accepted on behalf of Dr Maynard to [id number]   OBS/tele with diagnosis of AMS

## 2024-12-12 NOTE — PROCEDURES
PROCEDURE NOTE  Date: 12/12/2024   Name: Ian Cotter  YOB: 1950    Procedures  EKG completed

## 2024-12-13 ENCOUNTER — APPOINTMENT (OUTPATIENT)
Age: 74
End: 2024-12-13
Attending: HOSPITALIST
Payer: MEDICARE

## 2024-12-13 ENCOUNTER — APPOINTMENT (OUTPATIENT)
Dept: CT IMAGING | Age: 74
End: 2024-12-13
Attending: STUDENT IN AN ORGANIZED HEALTH CARE EDUCATION/TRAINING PROGRAM
Payer: MEDICARE

## 2024-12-13 ENCOUNTER — APPOINTMENT (OUTPATIENT)
Dept: MRI IMAGING | Age: 74
End: 2024-12-13
Attending: STUDENT IN AN ORGANIZED HEALTH CARE EDUCATION/TRAINING PROGRAM
Payer: MEDICARE

## 2024-12-13 LAB
ANION GAP SERPL CALC-SCNC: 13 MEQ/L (ref 8–16)
BASOPHILS ABSOLUTE: 0 THOU/MM3 (ref 0–0.1)
BASOPHILS NFR BLD AUTO: 0.7 %
BUN SERPL-MCNC: 14 MG/DL (ref 7–22)
CALCIUM SERPL-MCNC: 9.5 MG/DL (ref 8.5–10.5)
CHLORIDE SERPL-SCNC: 103 MEQ/L (ref 98–111)
CO2 SERPL-SCNC: 26 MEQ/L (ref 23–33)
CREAT SERPL-MCNC: 1 MG/DL (ref 0.4–1.2)
DEPRECATED RDW RBC AUTO: 42 FL (ref 35–45)
ECHO AR MAX VEL PISA: 2.3 M/S
ECHO AV CUSP MM: 1.7 CM
ECHO AV PEAK GRADIENT: 4 MMHG
ECHO AV PEAK VELOCITY: 1 M/S
ECHO AV REGURGITANT PHT: 979 MS
ECHO AV VELOCITY RATIO: 0.6
ECHO BSA: 1.34 M2
ECHO EST RA PRESSURE: 10 MMHG
ECHO LA AREA 2C: 12.9 CM2
ECHO LA AREA 4C: 15.4 CM2
ECHO LA DIAMETER INDEX: 2.03 CM/M2
ECHO LA DIAMETER: 2.7 CM
ECHO LA MAJOR AXIS: 4.8 CM
ECHO LA MINOR AXIS: 4.2 CM
ECHO LA VOL BP: 37 ML (ref 22–52)
ECHO LA VOL MOD A2C: 33 ML (ref 22–52)
ECHO LA VOL MOD A4C: 37 ML (ref 22–52)
ECHO LA VOL/BSA BIPLANE: 28 ML/M2 (ref 16–34)
ECHO LA VOLUME INDEX MOD A2C: 25 ML/M2 (ref 16–34)
ECHO LA VOLUME INDEX MOD A4C: 28 ML/M2 (ref 16–34)
ECHO LV EDV A2C: 36 ML
ECHO LV EDV A4C: 80 ML
ECHO LV EDV INDEX A4C: 60 ML/M2
ECHO LV EDV NDEX A2C: 27 ML/M2
ECHO LV EJECTION FRACTION A2C: 37 %
ECHO LV EJECTION FRACTION A4C: 37 %
ECHO LV EJECTION FRACTION BIPLANE: 37 % (ref 55–100)
ECHO LV ESV A2C: 22 ML
ECHO LV ESV A4C: 50 ML
ECHO LV ESV INDEX A2C: 17 ML/M2
ECHO LV ESV INDEX A4C: 38 ML/M2
ECHO LV FRACTIONAL SHORTENING: 21 % (ref 28–44)
ECHO LV INTERNAL DIMENSION DIASTOLE INDEX: 3.23 CM/M2
ECHO LV INTERNAL DIMENSION DIASTOLIC: 4.3 CM (ref 3.9–5.3)
ECHO LV INTERNAL DIMENSION SYSTOLIC INDEX: 2.56 CM/M2
ECHO LV INTERNAL DIMENSION SYSTOLIC: 3.4 CM
ECHO LV ISOVOLUMETRIC RELAXATION TIME (IVRT): 201 MS
ECHO LV IVSD: 0.8 CM (ref 0.6–0.9)
ECHO LV MASS 2D: 132.7 G (ref 67–162)
ECHO LV MASS INDEX 2D: 99.8 G/M2 (ref 43–95)
ECHO LV POSTERIOR WALL DIASTOLIC: 1.1 CM (ref 0.6–0.9)
ECHO LV RELATIVE WALL THICKNESS RATIO: 0.51
ECHO LVOT PEAK GRADIENT: 2 MMHG
ECHO LVOT PEAK VELOCITY: 0.6 M/S
ECHO MV A VELOCITY: 1.07 M/S
ECHO MV E DECELERATION TIME (DT): 324 MS
ECHO MV E VELOCITY: 0.47 M/S
ECHO MV E/A RATIO: 0.44
ECHO PV MAX VELOCITY: 0.6 M/S
ECHO PV PEAK GRADIENT: 1 MMHG
ECHO RIGHT VENTRICULAR SYSTOLIC PRESSURE (RVSP): 37 MMHG
ECHO RV INTERNAL DIMENSION: 1.6 CM
ECHO TV E WAVE: 0.4 M/S
ECHO TV REGURGITANT MAX VELOCITY: 2.62 M/S
ECHO TV REGURGITANT PEAK GRADIENT: 27 MMHG
EOSINOPHIL NFR BLD AUTO: 2.3 %
EOSINOPHILS ABSOLUTE: 0.1 THOU/MM3 (ref 0–0.4)
ERYTHROCYTE [DISTWIDTH] IN BLOOD BY AUTOMATED COUNT: 12.4 % (ref 11.5–14.5)
GFR SERPL CREATININE-BSD FRML MDRD: 59 ML/MIN/1.73M2
GLUCOSE BLD STRIP.AUTO-MCNC: 115 MG/DL (ref 70–108)
GLUCOSE BLD STRIP.AUTO-MCNC: 173 MG/DL (ref 70–108)
GLUCOSE BLD STRIP.AUTO-MCNC: 198 MG/DL (ref 70–108)
GLUCOSE BLD STRIP.AUTO-MCNC: 316 MG/DL (ref 70–108)
GLUCOSE SERPL-MCNC: 184 MG/DL (ref 70–108)
HCT VFR BLD AUTO: 37.5 % (ref 37–47)
HGB BLD-MCNC: 12.5 GM/DL (ref 12–16)
IMM GRANULOCYTES # BLD AUTO: 0.02 THOU/MM3 (ref 0–0.07)
IMM GRANULOCYTES NFR BLD AUTO: 0.3 %
LYMPHOCYTES ABSOLUTE: 2.1 THOU/MM3 (ref 1–4.8)
LYMPHOCYTES NFR BLD AUTO: 36.9 %
MCH RBC QN AUTO: 30.6 PG (ref 26–33)
MCHC RBC AUTO-ENTMCNC: 33.3 GM/DL (ref 32.2–35.5)
MCV RBC AUTO: 91.9 FL (ref 81–99)
MONOCYTES ABSOLUTE: 0.5 THOU/MM3 (ref 0.4–1.3)
MONOCYTES NFR BLD AUTO: 8.5 %
NEUTROPHILS ABSOLUTE: 3 THOU/MM3 (ref 1.8–7.7)
NEUTROPHILS NFR BLD AUTO: 51.3 %
NRBC BLD AUTO-RTO: 0 /100 WBC
PLATELET # BLD AUTO: 109 THOU/MM3 (ref 130–400)
PMV BLD AUTO: 12 FL (ref 9.4–12.4)
POTASSIUM SERPL-SCNC: 4.1 MEQ/L (ref 3.5–5.2)
RBC # BLD AUTO: 4.08 MILL/MM3 (ref 4.2–5.4)
SODIUM SERPL-SCNC: 142 MEQ/L (ref 135–145)
WBC # BLD AUTO: 5.8 THOU/MM3 (ref 4.8–10.8)

## 2024-12-13 PROCEDURE — 6360000002 HC RX W HCPCS: Performed by: HOSPITALIST

## 2024-12-13 PROCEDURE — 93306 TTE W/DOPPLER COMPLETE: CPT

## 2024-12-13 PROCEDURE — 96376 TX/PRO/DX INJ SAME DRUG ADON: CPT

## 2024-12-13 PROCEDURE — 2580000003 HC RX 258: Performed by: HOSPITALIST

## 2024-12-13 PROCEDURE — 36415 COLL VENOUS BLD VENIPUNCTURE: CPT

## 2024-12-13 PROCEDURE — 99233 SBSQ HOSP IP/OBS HIGH 50: CPT | Performed by: NURSE PRACTITIONER

## 2024-12-13 PROCEDURE — 82948 REAGENT STRIP/BLOOD GLUCOSE: CPT

## 2024-12-13 PROCEDURE — 96372 THER/PROPH/DIAG INJ SC/IM: CPT

## 2024-12-13 PROCEDURE — 97535 SELF CARE MNGMENT TRAINING: CPT

## 2024-12-13 PROCEDURE — 97166 OT EVAL MOD COMPLEX 45 MIN: CPT

## 2024-12-13 PROCEDURE — 70551 MRI BRAIN STEM W/O DYE: CPT

## 2024-12-13 PROCEDURE — 6370000000 HC RX 637 (ALT 250 FOR IP): Performed by: NURSE PRACTITIONER

## 2024-12-13 PROCEDURE — 80048 BASIC METABOLIC PNL TOTAL CA: CPT

## 2024-12-13 PROCEDURE — 85025 COMPLETE CBC W/AUTO DIFF WBC: CPT

## 2024-12-13 PROCEDURE — 6370000000 HC RX 637 (ALT 250 FOR IP): Performed by: HOSPITALIST

## 2024-12-13 PROCEDURE — 97162 PT EVAL MOD COMPLEX 30 MIN: CPT

## 2024-12-13 PROCEDURE — 6370000000 HC RX 637 (ALT 250 FOR IP)

## 2024-12-13 PROCEDURE — G0378 HOSPITAL OBSERVATION PER HR: HCPCS

## 2024-12-13 PROCEDURE — 93306 TTE W/DOPPLER COMPLETE: CPT | Performed by: INTERNAL MEDICINE

## 2024-12-13 PROCEDURE — 70450 CT HEAD/BRAIN W/O DYE: CPT

## 2024-12-13 PROCEDURE — 97530 THERAPEUTIC ACTIVITIES: CPT

## 2024-12-13 RX ADMIN — Medication 2000 UNITS: at 09:36

## 2024-12-13 RX ADMIN — MIDODRINE HYDROCHLORIDE 2.5 MG: 2.5 TABLET ORAL at 13:45

## 2024-12-13 RX ADMIN — MIDODRINE HYDROCHLORIDE 2.5 MG: 2.5 TABLET ORAL at 09:36

## 2024-12-13 RX ADMIN — SODIUM CHLORIDE, PRESERVATIVE FREE 10 ML: 5 INJECTION INTRAVENOUS at 09:37

## 2024-12-13 RX ADMIN — PANTOPRAZOLE SODIUM 40 MG: 40 TABLET, DELAYED RELEASE ORAL at 09:36

## 2024-12-13 RX ADMIN — SODIUM CHLORIDE, PRESERVATIVE FREE 10 ML: 5 INJECTION INTRAVENOUS at 20:00

## 2024-12-13 RX ADMIN — INSULIN GLARGINE 5 UNITS: 100 INJECTION, SOLUTION SUBCUTANEOUS at 09:36

## 2024-12-13 RX ADMIN — MEMANTINE HYDROCHLORIDE 5 MG: 5 TABLET ORAL at 09:36

## 2024-12-13 RX ADMIN — ISOSORBIDE MONONITRATE 60 MG: 60 TABLET, EXTENDED RELEASE ORAL at 09:37

## 2024-12-13 RX ADMIN — INSULIN LISPRO 6 UNITS: 100 INJECTION, SOLUTION INTRAVENOUS; SUBCUTANEOUS at 16:50

## 2024-12-13 RX ADMIN — FERROUS SULFATE TAB 325 MG (65 MG ELEMENTAL FE) 325 MG: 325 (65 FE) TAB at 09:37

## 2024-12-13 RX ADMIN — ACETAMINOPHEN 650 MG: 325 TABLET ORAL at 19:58

## 2024-12-13 RX ADMIN — CLOPIDOGREL BISULFATE 75 MG: 75 TABLET ORAL at 09:36

## 2024-12-13 RX ADMIN — DULOXETINE HYDROCHLORIDE 60 MG: 60 CAPSULE, DELAYED RELEASE ORAL at 09:36

## 2024-12-13 RX ADMIN — ATORVASTATIN CALCIUM 20 MG: 20 TABLET, FILM COATED ORAL at 19:58

## 2024-12-13 RX ADMIN — GABAPENTIN 300 MG: 300 CAPSULE ORAL at 09:37

## 2024-12-13 RX ADMIN — FAMOTIDINE 20 MG: 20 TABLET, FILM COATED ORAL at 09:36

## 2024-12-13 RX ADMIN — MEMANTINE HYDROCHLORIDE 5 MG: 5 TABLET ORAL at 19:58

## 2024-12-13 RX ADMIN — INSULIN LISPRO 2 UNITS: 100 INJECTION, SOLUTION INTRAVENOUS; SUBCUTANEOUS at 12:38

## 2024-12-13 RX ADMIN — MONTELUKAST 10 MG: 10 TABLET, FILM COATED ORAL at 19:58

## 2024-12-13 RX ADMIN — LEVOTHYROXINE SODIUM 88 MCG: 0.09 TABLET ORAL at 09:36

## 2024-12-13 RX ADMIN — WATER 1000 MG: 1 INJECTION INTRAMUSCULAR; INTRAVENOUS; SUBCUTANEOUS at 12:28

## 2024-12-13 RX ADMIN — CETIRIZINE HYDROCHLORIDE 10 MG: 10 TABLET, FILM COATED ORAL at 09:37

## 2024-12-13 RX ADMIN — ENOXAPARIN SODIUM 30 MG: 100 INJECTION SUBCUTANEOUS at 09:36

## 2024-12-13 ASSESSMENT — PAIN SCALES - GENERAL
PAINLEVEL_OUTOF10: 0
PAINLEVEL_OUTOF10: 2
PAINLEVEL_OUTOF10: 0
PAINLEVEL_OUTOF10: 4
PAINLEVEL_OUTOF10: 5

## 2024-12-13 ASSESSMENT — PAIN DESCRIPTION - LOCATION: LOCATION: HEAD

## 2024-12-13 NOTE — PROGRESS NOTES
Hospitalist Progress Note    Patient:  Ina Cotter      Unit/Bed:8B-23/023-A    YOB: 1950    MRN: [id number]       Acct: [id number]     PCP: Yeny Peralta MD    Date of Admission: 12/12/2024    Assessment/Plan:    Acute metabolic encephalopathy--CT head revealed no acute infarct or hemorrhage however minimal increase in ventricular volume with mild old subtle ependymal flow which is concerning for hydrocephalus, will obtain MRI brain; patient with significant baseline dementia  UTI (POA)--no urine obtained on admission so we will add to use urine in the lab if able; Rocephin from 12/12  Acute chest pain--no delta change greater than 6 and troponins, EKG showing sinus rhythm heart rate 72 with inverted T waves in lead I, 2, aVL along with V5 and V6 and left bundle branch block~nothing new when compared to old EKG dated July 2, 2024; echocardiogram has been ordered; currently denies  Physical deconditioning/debility--likely secondary to advanced age and comorbid medical conditions, PT/OT/ST on the case along with social work; patient has home health  Hypotension--appears patient has a history of orthostasis as she does require midodrine 2.5 mg 3 times daily  Diabetes mellitus type 2, uncontrolled--on Lantus and medium dose sliding scale, change diet to reflect carb control; monitor  Hypothyroidism--on Synthroid  Chronic systolic heart failure  Mild oral dysphagia--appreciate speech therapy input, on regular and thin liquids  Dementia--on Namenda  Diabetic neuropathy--on Neurontin  Known left bundle branch block  Recent subarachnoid bleed approximately 1 month ago secondary to a fall at home--is on Plavix      Expected discharge date: Pending clinical course    Disposition:    [x] Home       [] TCU       [] Rehab       [] Psych       [] SNF       [] Long Term Care Facility       [] Other-    Chief Complaint: Altered mental status    Hospital Course: Per H&P done December 12, 2024:    CL 97* 103   CO2 31 26   BUN 16 14   CREATININE 1.0 1.0   CALCIUM 9.9 9.5     Microbiology:    Urine cultures pending    Urinalysis:      Lab Results   Component Value Date/Time    NITRU NEGATIVE 12/12/2024 06:00 PM    WBCUA 5-9 12/12/2024 06:00 PM    WBCUA NONE SEEN 06/16/2023 01:00 AM    BACTERIA NONE SEEN 12/12/2024 06:00 PM    RBCUA 0-2 12/12/2024 06:00 PM    BLOODU NEGATIVE 12/12/2024 06:00 PM    GLUCOSEU >= 1000 12/12/2024 06:00 PM       Radiology:  No results found.    Code Status: Full Code    Tele:   [x] Yes sinus rhythm heart rate 69~stop             [] no    LDA: []CVC / []PICC / []Midline / []Goldman / []Drains / []Mediport / [x]None  Antibiotics: Rocephin  Steroids: None  Labs (still needed?): []Yes / [x]No  IVF (still needed?): []Yes / [x]No    Level of care: []Step Down / [x]Med-Surg  Bed Status: []Inpatient / [x]Observation  PT/OT: [x]Yes / []No    DVT Prophylaxis: [x] Lovenox / [] Heparin / [] SCDs / [] Already on Systemic Anticoagulation / [] None       Active Hospital Problems    Diagnosis Date Noted    AMS (altered mental status) [R41.82] 12/12/2024       Electronically signed by JAKE Contreras CNP on 12/13/2024 at 7:54 AM

## 2024-12-13 NOTE — PROGRESS NOTES
[facility]  INPATIENT OCCUPATIONAL THERAPY  STRZ MED SURG 8AB  EVALUATION      Discharge Recommendations: 24 hour supervision or assist  Equipment Recommendations: No        Time In: 1432  Time Out: 1452  Timed Code Treatment Minutes: 10 Minutes  Minutes: 20          Date: 2024  Patient Name: Ina Cotter,   Gender: female      MRN: [id number]  : 1950  (74 y.o.)  Referring Practitioner: La Nena Ding MD  Diagnosis: AMS (altered mental status)  Additional Pertinent Hx: Per EMR \"History obtained from the patients .     The patient is a 74 y.o. female who presents with complaints of worsening confusion.  Patient is accompanied by her  who is providing the history as patient has dementia and is poor historian.  He reports that last month she was admitted for a subarachnoid bleed after a fall at home.  From there patient went to the F for 2 weeks and has been home now for 2 weeks.  He reports that she continues to slowly decline at home, states that she has become more confused started talking about her baby last night.  He denies any fevers or chills, nausea or vomiting.  He reports that she has a poor appetite at baseline.  He denies any urinary or stool incontinence.  Patient was taken to [place] today for evaluation for confusion thought to have a possible UTI but had a CT head which showed some ventriculomegaly and sent here for eval.   also reports she was complaining of midsternal chest pain, this has since resolved.  \"    Restrictions/Precautions:  Restrictions/Precautions: Fall Risk, General Precautions  Position Activity Restriction  Other Position/Activity Restrictions: hx of dementia    Subjective  Chart Reviewed: Yes, Orders, Progress Notes, History and Physical  Patient assessed for rehabilitation services?: Yes    Subjective: Nurse approved OT eval. Pt in bed on arrival with  at bedside. Pt appears agitated with decreased understanding with  good awareness for safety to improve indep access with ADLs.  Short Term Goal 2: Pt will complete UB/LB dressing/bathing with SBA and minimal cues for sequencing and problem solving to improve indep with ADL routines.  Short Term Goal 3: Pt will tolerate x5 minutes standing with SBA to improve standing balance and activity tolerance required for sinkside I/ADLs.  Short Term Goal 4: Pt will perform toileting routine with SBA and minimal cues for problem solving to improve indep with ADL routines.    AM-[facility] Inpatient Daily Activity Raw Score: 19  AM-[facility] Inpatient ADL T-Scale Score : 40.22    Following session, patient left in safe position with all fall risk precautions in place.

## 2024-12-13 NOTE — PALLIATIVE CARE
Initial Evaluation        Patient:   Ina Cotter  YOB: 1950  Age:  74 y.o.  Room:  8B-23/023-A  MRN:  [id number]   Acct: [id number]    Date of Admission:  12/12/2024  5:51 AM  Date of Service:  12/13/2024  Completed By:  Baltazar Fitzgerald RN        Reason for Palliative Care Evaluation:-   Code status     Current Concerns   Unable to assess     Palliative Performance Scale   60%  Ambulation reduced; Significant disease; Can't do hobbies/housework; intake normal or reduced; occasional assist; LOC full/confusion     History    Patient admitted 12/12 from outside facility with altered mental status. Patient has history of dementia and lives at home with . She has had a recent stay inpatient from 11/8-11/14 after a fall with subarachnoid hemorrhage. After that admission she spent 2 weeks in an ECF and has been now home for 2 weeks with . Other chronic conditions include CAD, DM.      Goals of Care Discussions and Plan         Family/Patient Discussion:- Met with  at bedside. Patient is off floor for testing at this time. Per flow sheets patient has bouts of confusion. Introduced palliative care and role on medical team to . He consented to further conversation.  shared history of present illness.  noted she was in the hospital recently after her fall at home and since then her dementia has gotten worse at home. He is struggling to take care of her at home due to her altered mental status and he is considering another stay in the ECF for her. He is struggling to make this decision, emotional support provided. Discussed advanced care planning. Ney believes they have a medical power of  completed and it names their daughter Elicia as their agent. Elicia is out of town this weekend per Ney and unable to be reached at this time. Call placed to daughter Zee who confirmed she thinks they have a medical power of  and living will for Ina and  Elicia is their agent but she is not sure. Encouraged them to bring it in if possible. Discussed code status with Ney. Education provided on code status including FULL code and DNRCC-A. Potential risks and outcomes of cardiopulmonary resuscitation discussed. Ney stated he has had this conversation with Ina in the past and he believes she would want to be a FULL code at this time but wouldn't want \"to live on machines.\" Discussed with Ney that as her dementia progresses and if he and his daughters wish to make a different decision about her code status they would need to communicate this with the healthcare team and her doctors. Ney verbalized understanding. Palliative care contact information left with Ney.     Plan/Follow-Up:-   Palliative care will continue to follow as needed, please call for additional needs.     Treatment Limitations: They would want to attempt to sustain life by all medically effective means. This includes providing appropriate medical and surgical treatments as indicated to attempt to prolong life, including intensive care, mechanical ventilation and CPR. This is consistent with a code status of full code.    Code Status:Full Code No additional code details    ACP documents on file:  -None    Healthcare Power of /Healthcare Surrogate Decision Makers:  Per [place] Code 2133.08: [place] Hierarchy of Surrogate Decision Makers  Ney Cotter (spouse) pending medical power of  documents from daughter.             Electronically signed by Baltazar Fitzgerald RN on 12/13/2024 at 11:03 AM           Palliative Care Office: [phone]

## 2024-12-13 NOTE — PROGRESS NOTES
Sit: Supervision  Sit to Supine: Supervision   Scooting: Supervision, with verbal cues   Cues for improved positioning in bed  Transfers:  Sit to Stand: Stand By Assistance, Contact Guard Assistance, X 1, cues for hand placement, with verbal cues  Stand to Sit:Stand By Assistance, Contact Guard Assistance, X 1, cues for hand placement, with verbal cues  Cues for safety with RW, slightly impulsive without PT nearby or gait belt applied.   Ambulation:  Contact Guard Assistance, Minimal Assistance, X 1, with cues for safety, with verbal cues , with increased time for completion  Distance: 10 feet, 80 feet  Surface: Level Tile  Device: Rolling Walker  Gait Deviations: Slow Phyllis, Decreased Gait Speed, Mild Path Deviations, Unsteady Gait, and Increased reliance on assistive device   Cues to push walker and not \".\" Pt mostly CGA, min A at times due to unsteadiness with walker management and fair safety.   Stairs:  Not Tested    Exercise:  None    Functional Outcome Measures:  [facility] (6 CLICK) BASIC MOBILITY  AM-[facility] Inpatient Mobility Raw Score : 20  AM-[facility] Inpatient T-Scale Score : 47.67          Modified Agnes:  Premorbid Functional Status: Not Applicable  Current Functional Status:  Not Applicable    ASSESSMENT:  Activity Tolerance:  Patient tolerance of treatment:Good.    Treatment Initiated: Treatment and education initiated within context of evaluation.  Evaluation time included review of current medical information, gathering information related to past medical, social and functional history, completion of standardized testing, formal and informal observation of tasks, assessment of data and development of plan of care and goals.  Treatment time included skilled education and facilitation of tasks to increase safety and independence with functional mobility for improved independence and quality of life.    Assessment:  Body Structures, Functions, Activity Limitations Requiring Skilled Therapeutic  Intervention: Decreased functional mobility , Decreased cognition, Decreased endurance, Decreased posture, Decreased balance, Decreased strength, Decreased safe awareness  Assessment: Ina Cotter is a 74 y.o. female who presents with the deficits stated previously. Pt requires 1 person assist for functional tasks with use of walker for support. Pt cont to require skilled PT services to increase IND with functional tasks and progress towards PLOF to return to home environment safely.   Therapy Prognosis: Good    Requires PT Follow-Up: Yes    Patient Education:      .    Patient Education  Education Given To: Patient, Family  Education Provided: Role of Therapy, Plan of Care, Transfer Training, Equipment, Mobility Training  Education Method: Verbal  Barriers to Learning: Cognition  Education Outcome: Verbalized understanding, Demonstrated understanding, Continued education needed       Plan:  Current Treatment Recommendations: Strengthening, Balance training, Gait training, Stair training, Functional mobility training, Transfer training, Neuromuscular re-education, Endurance training, Equipment evaluation, education, & procurement, Patient/Caregiver education & training, Safety education & training, Therapeutic activities, Home exercise program  General Plan:  (3-5x GM)    Goals:  Patient Goals : return home with   Short Term Goals  Time Frame for Short Term Goals: by discharge  Short Term Goal 1: Pt will demo sit to/from stand transfers with S with RW to progress with mobility.  Short Term Goal 2: Pt will demo S with gait for >200 feet with RW to progress with mobility.  Short Term Goal 3: Pt will negotiate steps with rail for support and SBA to return home safely.  Long Term Goals  Time Frame for Long Term Goals : NA due to short ELOS    Following session, patient left in safe position with all fall risk precautions in place. Pt in bed following session, all needs and call light in reach, alarm on.

## 2024-12-13 NOTE — CARE COORDINATION
12/13/24, 2:26 PM EST    DISCHARGE PLANNING EVALUATION    Spoke with patient and  regarding discharge plan.  Both were up set with time taking to get answers from testing.  Explained that it takes time for  to read results of testing, may be later today or tomorrow.  Confirmed plan for patient to return home with  and resume HH and Passport services.

## 2024-12-13 NOTE — PLAN OF CARE
Problem: Chronic Conditions and Co-morbidities  Goal: Patient's chronic conditions and co-morbidity symptoms are monitored and maintained or improved  12/12/2024 2220 by Cony Alamo, RN  Outcome: Progressing  12/12/2024 1101 by Loretta Gonzalez RN  Outcome: Progressing     Problem: Discharge Planning  Goal: Discharge to home or other facility with appropriate resources  12/12/2024 2220 by Cony Alamo, RN  Outcome: Progressing  12/12/2024 1501 by Ina Mendenhall LSW  Outcome: Progressing  12/12/2024 1101 by Loretta Gonzalez RN  Outcome: Progressing     Problem: Safety - Adult  Goal: Free from fall injury  Outcome: Progressing     Problem: ABCDS Injury Assessment  Goal: Absence of physical injury  Outcome: Progressing

## 2024-12-13 NOTE — PROGRESS NOTES
[facility]  SPEECH THERAPY MISSED TREATMENT NOTE  STRZ MED SURG 8AB      Date: 2024  Patient Name: Ina Cotter        MRN: [id number]    : 1950  (74 y.o.)    REASON FOR MISSED TREATMENT:  ST attempted to see patient this afternoon for completion of [id number] evaluation. RN approved attempt; however, upon arrival to room, patient currently unavailable d/t working with OT. ST to re-attempt at a later date/time as patient is medically appropriate and available.     Isa Frank M.A., CCC-SLP [id number]             lr 1000

## 2024-12-13 NOTE — PROGRESS NOTES
[facility]  PHYSICAL THERAPY MISSED TREATMENT NOTE  STRZ MED SURG 8AB    Date: 2024  Patient Name: Ina Cotter        MRN: [id number]   : 1950  (74 y.o.)  Gender: female                REASON FOR MISSED TREATMENT:  Patient at testing and/or off unit.  Pt at MRI, will check back as able.

## 2024-12-14 VITALS
RESPIRATION RATE: 18 BRPM | SYSTOLIC BLOOD PRESSURE: 160 MMHG | BODY MASS INDEX: 21.17 KG/M2 | HEART RATE: 73 BPM | TEMPERATURE: 98.1 F | HEIGHT: 57 IN | OXYGEN SATURATION: 97 % | DIASTOLIC BLOOD PRESSURE: 81 MMHG | WEIGHT: 98.11 LBS

## 2024-12-14 LAB
ANION GAP SERPL CALC-SCNC: 11 MEQ/L (ref 8–16)
BASOPHILS ABSOLUTE: 0 THOU/MM3 (ref 0–0.1)
BASOPHILS NFR BLD AUTO: 0.6 %
BUN SERPL-MCNC: 16 MG/DL (ref 7–22)
CALCIUM SERPL-MCNC: 9.7 MG/DL (ref 8.5–10.5)
CHLORIDE SERPL-SCNC: 102 MEQ/L (ref 98–111)
CO2 SERPL-SCNC: 27 MEQ/L (ref 23–33)
CREAT SERPL-MCNC: 1 MG/DL (ref 0.4–1.2)
DEPRECATED RDW RBC AUTO: 42.8 FL (ref 35–45)
EOSINOPHIL NFR BLD AUTO: 3 %
EOSINOPHILS ABSOLUTE: 0.1 THOU/MM3 (ref 0–0.4)
ERYTHROCYTE [DISTWIDTH] IN BLOOD BY AUTOMATED COUNT: 12.6 % (ref 11.5–14.5)
GFR SERPL CREATININE-BSD FRML MDRD: 59 ML/MIN/1.73M2
GLUCOSE BLD STRIP.AUTO-MCNC: 285 MG/DL (ref 70–108)
GLUCOSE SERPL-MCNC: 293 MG/DL (ref 70–108)
HCT VFR BLD AUTO: 37.3 % (ref 37–47)
HGB BLD-MCNC: 12.5 GM/DL (ref 12–16)
IMM GRANULOCYTES # BLD AUTO: 0.01 THOU/MM3 (ref 0–0.07)
IMM GRANULOCYTES NFR BLD AUTO: 0.2 %
LYMPHOCYTES ABSOLUTE: 2.2 THOU/MM3 (ref 1–4.8)
LYMPHOCYTES NFR BLD AUTO: 44.6 %
MCH RBC QN AUTO: 31 PG (ref 26–33)
MCHC RBC AUTO-ENTMCNC: 33.5 GM/DL (ref 32.2–35.5)
MCV RBC AUTO: 92.6 FL (ref 81–99)
MONOCYTES ABSOLUTE: 0.4 THOU/MM3 (ref 0.4–1.3)
MONOCYTES NFR BLD AUTO: 9.1 %
NEUTROPHILS ABSOLUTE: 2.1 THOU/MM3 (ref 1.8–7.7)
NEUTROPHILS NFR BLD AUTO: 42.5 %
NRBC BLD AUTO-RTO: 0 /100 WBC
PLATELET # BLD AUTO: 112 THOU/MM3 (ref 130–400)
PMV BLD AUTO: 12.2 FL (ref 9.4–12.4)
POTASSIUM SERPL-SCNC: 3.9 MEQ/L (ref 3.5–5.2)
RBC # BLD AUTO: 4.03 MILL/MM3 (ref 4.2–5.4)
SODIUM SERPL-SCNC: 140 MEQ/L (ref 135–145)
WBC # BLD AUTO: 4.9 THOU/MM3 (ref 4.8–10.8)

## 2024-12-14 PROCEDURE — 36415 COLL VENOUS BLD VENIPUNCTURE: CPT

## 2024-12-14 PROCEDURE — G0378 HOSPITAL OBSERVATION PER HR: HCPCS

## 2024-12-14 PROCEDURE — 99238 HOSP IP/OBS DSCHRG MGMT 30/<: CPT | Performed by: NURSE PRACTITIONER

## 2024-12-14 PROCEDURE — 82948 REAGENT STRIP/BLOOD GLUCOSE: CPT

## 2024-12-14 PROCEDURE — 6370000000 HC RX 637 (ALT 250 FOR IP): Performed by: HOSPITALIST

## 2024-12-14 PROCEDURE — 80048 BASIC METABOLIC PNL TOTAL CA: CPT

## 2024-12-14 PROCEDURE — 85025 COMPLETE CBC W/AUTO DIFF WBC: CPT

## 2024-12-14 RX ORDER — CEFDINIR 300 MG/1
300 CAPSULE ORAL 2 TIMES DAILY
Qty: 10 CAPSULE | Refills: 0 | Status: SHIPPED | OUTPATIENT
Start: 2024-12-14 | End: 2024-12-19

## 2024-12-14 RX ADMIN — LEVOTHYROXINE SODIUM 88 MCG: 0.09 TABLET ORAL at 07:45

## 2024-12-14 NOTE — PLAN OF CARE
Problem: Chronic Conditions and Co-morbidities  Goal: Patient's chronic conditions and co-morbidity symptoms are monitored and maintained or improved  12/14/2024 1001 by Swapna Street RN  Outcome: Adequate for Discharge  12/13/2024 2225 by Michele Gillette RN  Outcome: Progressing     Problem: Discharge Planning  Goal: Discharge to home or other facility with appropriate resources  12/14/2024 1001 by Swapna Street RN  Outcome: Adequate for Discharge  12/13/2024 2225 by Michele Gillette RN  Outcome: Progressing     Problem: Safety - Adult  Goal: Free from fall injury  12/14/2024 1001 by Swapna Street RN  Outcome: Adequate for Discharge  12/13/2024 2225 by Michele Gillette RN  Outcome: Progressing     Problem: ABCDS Injury Assessment  Goal: Absence of physical injury  12/14/2024 1001 by Swapna Street RN  Outcome: Adequate for Discharge  12/13/2024 2225 by Michele Gillette RN  Outcome: Progressing     Problem: Skin/Tissue Integrity  Goal: Absence of new skin breakdown  Description: 1.  Monitor for areas of redness and/or skin breakdown  2.  Assess vascular access sites hourly  3.  Every 4-6 hours minimum:  Change oxygen saturation probe site  4.  Every 4-6 hours:  If on nasal continuous positive airway pressure, respiratory therapy assess nares and determine need for appliance change or resting period.  12/14/2024 1001 by Swapna Street RN  Outcome: Adequate for Discharge  12/13/2024 2225 by Michele Gillette RN  Outcome: Progressing     Problem: Pain  Goal: Verbalizes/displays adequate comfort level or baseline comfort level  12/14/2024 1001 by Swapna Street RN  Outcome: Adequate for Discharge  12/13/2024 2225 by Michele Gillette RN  Outcome: Progressing

## 2024-12-14 NOTE — PROGRESS NOTES
Pt  educated on discharge instructions, questions answered. Pt  to  medications. [facility] home health is already in place. Pt IV removed, hemostasis achieved. Pt left via wheelchair with transport. Discharged home with

## 2024-12-14 NOTE — DISCHARGE SUMMARY
Hospital Medicine Discharge Summary      Patient Identification:   Ina Cotter   : 1950  MRN: [id number]   Account: [id number]      Patient's PCP: Yeny Peralta MD    Admit Date: 2024     Discharge Date: 2024      Admitting Physician: Lio Maynard MD     Discharging Nurse Practitioner: JAKE Contreras - CNP     Discharge Diagnoses with Assessment/Plan:  Acute metabolic encephalopathy versus dementia--CT head revealed no acute infarct or hemorrhage however minimal increase in ventricular volume with mild old subtle ependymal flow which is concerning for hydrocephalus, MRI brain showed mild atrophy and dilatation of the lateral ventricle; patient with significant baseline dementia; spouse reports her mentation has improved  UTI (POA)--urine cultures pending; Rocephin from - and will transition to Omnicef 300 mg twice daily for 5 more days on discharge  Acute chest pain--no delta change greater than 6 and troponins, EKG showing sinus rhythm heart rate 72 with inverted T waves in lead I, 2, aVL along with V5 and V6 and left bundle branch block~nothing new when compared to old EKG dated 2024; echocardiogram has been ordered; currently denies  Physical deconditioning/debility--likely secondary to advanced age and comorbid medical conditions, PT/OT/ST on the case along with social work; patient has home health, spouse refused SNF  Hypotension--appears patient has a history of orthostasis as she does require midodrine 2.5 mg 3 times daily  Diabetes mellitus type 2, uncontrolled--on Lantus   Hypothyroidism--on Synthroid  Chronic systolic/diet heart failure--echocardiogram from 2024 revealed an EF 35 to 40% and grade 1 diastolic dysfunction  Mild oral dysphagia--appreciate speech therapy input, on regular and thin liquids  Dementia--on Namenda  Diabetic neuropathy--on Neurontin  Known left bundle branch block  Recent subarachnoid bleed approximately

## 2024-12-16 NOTE — CARE COORDINATION
12/16/24, 8:11 AM EST    Patient goals/plan/ treatment preferences discussed by  and .  Patient goals/plan/ treatment preferences reviewed with patient/ family.  Patient/ family verbalize understanding of discharge plan and are in agreement with goal/plan/treatment preferences.  Understanding was demonstrated using the teach back method.  AVS provided by RN at time of discharge, which includes all necessary medical information pertaining to the patients current course of illness, treatment, post-discharge goals of care, and treatment preferences.     Services At/After Discharge: Home Health, Nursing service, OT, and PT       Patient discharge 12/14 to home with spouse and [facility].  Informed Judi at [facility] that Patient discharge on 12/14.

## 2024-12-17 ENCOUNTER — OFFICE VISIT (OUTPATIENT)
Dept: INTERNAL MEDICINE CLINIC | Age: 74
End: 2024-12-17

## 2024-12-17 VITALS
HEIGHT: 57 IN | TEMPERATURE: 97.7 F | SYSTOLIC BLOOD PRESSURE: 118 MMHG | WEIGHT: 102.4 LBS | HEART RATE: 79 BPM | DIASTOLIC BLOOD PRESSURE: 62 MMHG | BODY MASS INDEX: 22.09 KG/M2

## 2024-12-17 DIAGNOSIS — E10.8 TYPE 1 DIABETES MELLITUS WITH COMPLICATION (HCC): Primary | ICD-10-CM

## 2024-12-17 RX ORDER — ASPIRIN 81 MG/1
81 TABLET ORAL DAILY
COMMUNITY

## 2024-12-17 RX ORDER — BLOOD SUGAR DIAGNOSTIC
1 STRIP MISCELLANEOUS 3 TIMES DAILY
COMMUNITY
Start: 2024-09-13

## 2024-12-17 RX ORDER — ONDANSETRON 4 MG/1
4 TABLET, ORALLY DISINTEGRATING ORAL EVERY 8 HOURS PRN
COMMUNITY
Start: 2024-05-24

## 2024-12-17 RX ORDER — HYDROXYZINE HYDROCHLORIDE 10 MG/1
1 TABLET, FILM COATED ORAL EVERY 8 HOURS PRN
COMMUNITY
Start: 2024-11-20

## 2024-12-17 NOTE — PATIENT INSTRUCTIONS
Lantus 15 units about 11am daily  Humalog scale with breakfast, lunch and dinner             Hlog scale 201-250=3 units,, 251-300=4 units, 301-350= 6 units, 351-400= 8 units, over 400 9 units  Physical therapy 2 times per week  Keep skittles available for low blood sugars  Bill to give all insulin shots  Elicia is setting up pill boxes  Dexcom G7 --sensor started today.  Bring the reader for the Dexcom to all appointments

## 2024-12-17 NOTE — PROGRESS NOTES
[facility]  [address] [place] [address]  [phone] (phone)  [phone] (fax)    Patient ID: Ina Cotter 1950  Referring Provider: Dr. CAROLINA Peralta     Patient's name and  were verified.    Subjective:    She presents for a follow-up diabetic visit. She has type 1 diabetes mellitus. She is compliant some of the time.  Assessment:     Lab Results   Component Value Date/Time    LABA1C 9.1 2024 08:24 AM    BUN 16 2024 05:41 AM    CREATININE 1.0 2024 05:41 AM    CREATININE 1.4 2013 12:00 AM     Vitals:    24 1742   BP: 118/62   Site: Right Upper Arm   Position: Sitting   Pulse: 79   Temp: 97.7 °F (36.5 °C)   Weight: 46.4 kg (102 lb 6.4 oz)   Height: 1.448 m (4' 9\")     Wt Readings from Last 3 Encounters:   24 46.4 kg (102 lb 6.4 oz)   24 44.5 kg (98 lb 1.7 oz)   24 44.3 kg (97 lb 10.6 oz)     Ht Readings from Last 3 Encounters:   24 1.448 m (4' 9\")   24 1.448 m (4' 9.01\")   24 1.448 m (4' 9\")     Est, Glom Filt Rate   Date Value Ref Range Status   2024 59 (A) >60 ml/min/1.73m2 Final   12/15/2023 53 (A) >60 ml/min/1.73m2 Final     Diabetes Pharmacotherapy:  Lantus 15 units @ 11am  Humalog Hlog scale 201-250=3 units,, 251-300=4 units, 301-350= 6 units, 351-400= 8 units, over 400 9 units             Glucose Trends:   Glucose at FBS today resulted at 386mg/dl  Current monitoring regimen: Fingerstick blood tests - 3 times daily  Home blood sugar trends:    -Fasting AM: ? 200+ cannot recall   -Before lunch:   -Before dinner:    -Bedtime: ? 226  Any episodes of hypoglycemia? No; not since hospital   -Treats with skittles    Lifestyle Factors:   Previous visit with dietician: remote  Current diet: B: 11am mom's meals sc eggs/ toast            L: --                       D: meatloaf/ potato                       Snacks: crackers  4 sandwich crackers --afternoon

## 2025-03-07 ENCOUNTER — HOSPITAL ENCOUNTER (EMERGENCY)
Age: 75
Discharge: HOME | End: 2025-03-08
Attending: EMERGENCY MEDICINE
Payer: MEDICAID

## 2025-03-07 VITALS
OXYGEN SATURATION: 99 % | SYSTOLIC BLOOD PRESSURE: 170 MMHG | RESPIRATION RATE: 18 BRPM | HEART RATE: 70 BPM | DIASTOLIC BLOOD PRESSURE: 89 MMHG | TEMPERATURE: 97.8 F

## 2025-03-07 DIAGNOSIS — F23 ACUTE PSYCHOSIS (HCC): Primary | ICD-10-CM

## 2025-03-07 DIAGNOSIS — R45.851 SUICIDAL THOUGHTS: ICD-10-CM

## 2025-03-07 LAB
ALBUMIN SERPL BCG-MCNC: 3.7 G/DL (ref 3.4–4.9)
ALP SERPL-CCNC: 62 U/L (ref 35–104)
ALT SERPL W/O P-5'-P-CCNC: 18 U/L (ref 10–35)
AMPHETAMINES UR QL SCN: NEGATIVE
ANION GAP SERPL CALC-SCNC: 14 MEQ/L (ref 8–16)
APAP SERPL-MCNC: < 5 UG/ML (ref 10–30)
AST SERPL-CCNC: 37 U/L (ref 10–35)
BACTERIA URNS QL MICRO: ABNORMAL /HPF
BARBITURATES UR QL SCN: NEGATIVE
BASOPHILS ABSOLUTE: 0 THOU/MM3 (ref 0–0.1)
BASOPHILS NFR BLD AUTO: 0.4 %
BENZODIAZ UR QL SCN: NEGATIVE
BILIRUB CONJ SERPL-MCNC: 0.3 MG/DL (ref 0–0.2)
BILIRUB SERPL-MCNC: 0.6 MG/DL (ref 0.3–1.2)
BILIRUB UR QL STRIP.AUTO: NEGATIVE
BUN SERPL-MCNC: 18 MG/DL (ref 8–23)
BZE UR QL SCN: NEGATIVE
CALCIUM SERPL-MCNC: 9.6 MG/DL (ref 8.8–10.2)
CANNABINOIDS UR QL SCN: NEGATIVE
CASTS #/AREA URNS LPF: ABNORMAL /LPF
CASTS 2: ABNORMAL /LPF
CHARACTER UR: CLEAR
CHLORIDE SERPL-SCNC: 101 MEQ/L (ref 98–111)
CO2 SERPL-SCNC: 23 MEQ/L (ref 22–29)
COLOR, UA: YELLOW
CREAT SERPL-MCNC: 1.1 MG/DL (ref 0.5–0.9)
CRYSTALS URNS MICRO: ABNORMAL
DEPRECATED RDW RBC AUTO: 44.5 FL (ref 35–45)
EKG ATRIAL RATE: 85 BPM
EKG P AXIS: 29 DEGREES
EKG P-R INTERVAL: 174 MS
EKG Q-T INTERVAL: 414 MS
EKG QRS DURATION: 126 MS
EKG QTC CALCULATION (BAZETT): 492 MS
EKG R AXIS: -5 DEGREES
EKG T AXIS: 150 DEGREES
EKG VENTRICULAR RATE: 85 BPM
EOSINOPHIL NFR BLD AUTO: 0.6 %
EOSINOPHILS ABSOLUTE: 0 THOU/MM3 (ref 0–0.4)
EPITHELIAL CELLS, UA: ABNORMAL /HPF
ERYTHROCYTE [DISTWIDTH] IN BLOOD BY AUTOMATED COUNT: 12.7 % (ref 11.5–14.5)
ETHANOL SERPL-MCNC: < 0.01 % (ref 0–0.08)
FENTANYL: NEGATIVE
GFR SERPL CREATININE-BSD FRML MDRD: 53 ML/MIN/1.73M2
GLUCOSE SERPL-MCNC: 239 MG/DL (ref 74–109)
GLUCOSE UR QL STRIP.AUTO: 250 MG/DL
HCT VFR BLD AUTO: 38.5 % (ref 37–47)
HGB BLD-MCNC: 12.2 GM/DL (ref 12–16)
HGB UR QL STRIP.AUTO: NEGATIVE
IMM GRANULOCYTES # BLD AUTO: 0.03 THOU/MM3 (ref 0–0.07)
IMM GRANULOCYTES NFR BLD AUTO: 0.4 %
KETONES UR QL STRIP.AUTO: NEGATIVE
LYMPHOCYTES ABSOLUTE: 1.7 THOU/MM3 (ref 1–4.8)
LYMPHOCYTES NFR BLD AUTO: 23.7 %
MCH RBC QN AUTO: 30.3 PG (ref 26–33)
MCHC RBC AUTO-ENTMCNC: 31.7 GM/DL (ref 32.2–35.5)
MCV RBC AUTO: 95.8 FL (ref 81–99)
MISCELLANEOUS 2: ABNORMAL
MONOCYTES ABSOLUTE: 0.5 THOU/MM3 (ref 0.4–1.3)
MONOCYTES NFR BLD AUTO: 6.6 %
NEUTROPHILS ABSOLUTE: 4.8 THOU/MM3 (ref 1.8–7.7)
NEUTROPHILS NFR BLD AUTO: 68.3 %
NITRITE UR QL STRIP: NEGATIVE
NRBC BLD AUTO-RTO: 0 /100 WBC
OPIATES UR QL SCN: NEGATIVE
OSMOLALITY SERPL CALC.SUM OF ELEC: 285.4 MOSMOL/KG (ref 275–300)
OXYCODONE: NEGATIVE
PCP UR QL SCN: NEGATIVE
PH UR STRIP.AUTO: 8 [PH] (ref 5–9)
PLATELET # BLD AUTO: 156 THOU/MM3 (ref 130–400)
PMV BLD AUTO: 11.5 FL (ref 9.4–12.4)
POTASSIUM SERPL-SCNC: 4.5 MEQ/L (ref 3.5–5.2)
PROT SERPL-MCNC: 6.5 G/DL (ref 6.4–8.3)
PROT UR STRIP.AUTO-MCNC: NEGATIVE MG/DL
RBC # BLD AUTO: 4.02 MILL/MM3 (ref 4.2–5.4)
RBC URINE: ABNORMAL /HPF
RENAL EPI CELLS #/AREA URNS HPF: ABNORMAL /[HPF]
SALICYLATES SERPL-MCNC: < 0.5 MG/DL (ref 2–10)
SODIUM SERPL-SCNC: 138 MEQ/L (ref 135–145)
SP GR UR REFRACT.AUTO: 1.01 (ref 1–1.03)
UROBILINOGEN, URINE: 1 EU/DL (ref 0–1)
WBC # BLD AUTO: 7 THOU/MM3 (ref 4.8–10.8)
WBC #/AREA URNS HPF: ABNORMAL /HPF
WBC #/AREA URNS HPF: ABNORMAL /[HPF]
YEAST LIKE FUNGI URNS QL MICRO: ABNORMAL

## 2025-03-07 PROCEDURE — 80053 COMPREHEN METABOLIC PANEL: CPT

## 2025-03-07 PROCEDURE — 96372 THER/PROPH/DIAG INJ SC/IM: CPT

## 2025-03-07 PROCEDURE — 80179 DRUG ASSAY SALICYLATE: CPT

## 2025-03-07 PROCEDURE — 85025 COMPLETE CBC W/AUTO DIFF WBC: CPT

## 2025-03-07 PROCEDURE — 87086 URINE CULTURE/COLONY COUNT: CPT

## 2025-03-07 PROCEDURE — 80143 DRUG ASSAY ACETAMINOPHEN: CPT

## 2025-03-07 PROCEDURE — 93005 ELECTROCARDIOGRAM TRACING: CPT | Performed by: EMERGENCY MEDICINE

## 2025-03-07 PROCEDURE — 80307 DRUG TEST PRSMV CHEM ANLYZR: CPT

## 2025-03-07 PROCEDURE — 81001 URINALYSIS AUTO W/SCOPE: CPT

## 2025-03-07 PROCEDURE — 36415 COLL VENOUS BLD VENIPUNCTURE: CPT

## 2025-03-07 PROCEDURE — 99284 EMERGENCY DEPT VISIT MOD MDM: CPT

## 2025-03-07 PROCEDURE — 82248 BILIRUBIN DIRECT: CPT

## 2025-03-07 PROCEDURE — 6360000002 HC RX W HCPCS: Performed by: EMERGENCY MEDICINE

## 2025-03-07 PROCEDURE — 82077 ASSAY SPEC XCP UR&BREATH IA: CPT

## 2025-03-07 RX ORDER — DROPERIDOL 2.5 MG/ML
1.25 INJECTION, SOLUTION INTRAMUSCULAR; INTRAVENOUS ONCE
Status: COMPLETED | OUTPATIENT
Start: 2025-03-07 | End: 2025-03-07

## 2025-03-07 RX ORDER — ACETAMINOPHEN 325 MG/1
650 TABLET ORAL EVERY 6 HOURS PRN
COMMUNITY

## 2025-03-07 RX ORDER — FERROUS SULFATE 325(65) MG
325 TABLET ORAL
COMMUNITY

## 2025-03-07 RX ORDER — ALPRAZOLAM 0.25 MG
0.25 TABLET ORAL NIGHTLY PRN
COMMUNITY
End: 2025-03-14

## 2025-03-07 RX ORDER — RIVASTIGMINE TARTRATE 3 MG/1
3 CAPSULE ORAL 2 TIMES DAILY
COMMUNITY

## 2025-03-07 RX ORDER — TRAZODONE HYDROCHLORIDE 50 MG/1
50 TABLET ORAL NIGHTLY
COMMUNITY

## 2025-03-07 RX ADMIN — DROPERIDOL 1.25 MG: 2.5 INJECTION, SOLUTION INTRAMUSCULAR; INTRAVENOUS at 17:30

## 2025-03-07 ASSESSMENT — LIFESTYLE VARIABLES: HOW OFTEN DO YOU HAVE A DRINK CONTAINING ALCOHOL: NEVER

## 2025-03-07 ASSESSMENT — PAIN - FUNCTIONAL ASSESSMENT: PAIN_FUNCTIONAL_ASSESSMENT: NONE - DENIES PAIN

## 2025-03-07 NOTE — ED NOTES
Presents to ER with [facility]. Pt combative at time of transport.  EMS reports patient grabbed finger nail clippers after reporting she was suicidal. Reports  had to remove the clippers today due to pt stating she was going to harm herself with them. Facility reports items were removed from patient today at facility due to safety. Nurse reports patient is suicidal and they had to transport per facility DO. This RN unable to remove pt coat. Pt refusing to allow VS or any questions to be asked. She reports she would like to be with her  who is ill. She reports she did not want to come to this hospital. This RN did not move forward with triage at this time due to combative behaviors. Level A paged. Sitter at bedside. Charge made aware of concerns. Will monitor
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
## 2025-03-07 NOTE — ED NOTES
Attempted to obtain EKG, but was unable to. Pt stated that we were lying to her and she did not want us to help. Pt si sitting on edge of bed with 2 sitters at bedside. Will monitor.

## 2025-03-07 NOTE — ED PROVIDER NOTES
[facility]  EMERGENCY MEDICINE ATTENDING ATTESTATION      Evaluation of Ina Cotter.   Case discussed and care plan developed with resident physician.   I agree with the resident physician documentation and plan as documented by him, except if my documentation differs.   Patient seen, interviewed and examined by me.  I reviewed the medical, surgical, family and social history, medications and allergies.   I have reviewed and interpreted all available lab, radiology and ekg results available at the moment.  I have reviewed the nursing documentation.     Please see the resident physician completed note for final disposition except as documented on this attestation.   I have reviewed and interpreted all available lab, radiology and ekg results available at the moment.  Diagnosis, treatment and disposition plans were discussed and agreed upon by patient.   This transcription was electronically signed. It was dictated by use of voice recognition software and electronically transcribed. The transcription may contain errors not detected in proofreading.     I performed direct supervision and was present for the critical portion following procedures: None  Critical care time on this case: None    Electronically signed by Poncho Bruce MD on 3/7/25 at 4:51 PM Poncho uDckworth MD  03/07/25 5718

## 2025-03-07 NOTE — ED PROVIDER NOTES
[facility] EMERGENCY DEPARTMENT      EMERGENCY MEDICINE     Pt Name: Ina Cotter  MRN: [id number]  Birthdate 1950  Date of evaluation: 3/7/2025  Provider: Patricio Francis DO  Supervising Physician: Poncho Bruce MD    CHIEF COMPLAINT       Chief Complaint   Patient presents with    Suicidal    Aggressive Behavior     HISTORY OF PRESENT ILLNESS   Ina Cotter is a 74 y.o. female with a history of dementia, anxiety, depression, DM who presents to the emergency department from SNF for eval ration of suicidal ideation.  When asked why she is here patient states \"some nitwit with sent me here\".  Patient told me that her daughter was screaming her .  She states that she lives at home and that she just wants to get back to her  who is sick.  Patient unwilling to tell me who she made the statements to.  She is unwilling to get any blood work.  Patient thinks that nobody believes her and that people are out to get her.  She states she is not actually suicidal she just says that stuff from time to time.  She denies any homicidal ideation.    I spoke to her  on the phone who states that she has been having visual and auditory loose nations and delusions for about the past 8 months.  But that today apparently she was making repeated statements about how she was going to do something to hurt herself to nursing staff and so they sent her here.    PASTMEDICAL HISTORY     Past Medical History:   Diagnosis Date    Abnormal ultrasound cardiogram     large global hypokinesis EF35-40%    Anxiety and depression     CKD (chronic kidney disease)     Dementia (HCC)     GERD (gastroesophageal reflux disease)     Hyperlipidemia     Hypertension     Hypothyroidism     Neuropathy     Type II or unspecified type diabetes mellitus without mention of complication, not stated as uncontrolled     Diagnosed 1974       Patient Active Problem List   Diagnosis Code    Type 1 diabetes mellitus with complication

## 2025-03-08 NOTE — ED NOTES
Pt is dangling at the side of the bed. Pt is tearful but denies any pain. Pt is calm and voiced no concerns. Water was given and crackers were offered but pt declined stating she wasn't hungry. No concerns were voiced at this time. Call light within reach. Sitter at bedside. Will monitor

## 2025-03-08 NOTE — ED NOTES
Pt is sitting at the side of the bed. Pt is still teary. Pt denied food and water. No concerns were voiced at this time. Sitter at bedside. Call light in reach. Will monitor.

## 2025-03-08 NOTE — ED NOTES
Pt assisted in using bed side commode. Pt back in bed with call light in reach. Sitter is bedside.

## 2025-03-08 NOTE — ED NOTES
Pt tearful stating \"doesn't want to be here\". Kicked bed railing twice, was able to be verbally corrected.

## 2025-03-08 NOTE — ED PROVIDER NOTES
Transfer of Care Note:   Physician Signing out: Patricio Francis DO  Receiving Physician: Richie Mariee MD  Sign out time: 2000      Brief history:  74 years old female history of dementia and hallucinations presenting with disorganized speech and suicidal thoughts. Pt given droperidol for agitation. Pt EMC'd.     Items pending that need to be checked:  Urinalysis + UDS      Tentative Impression of patient:  Suicidal ideation    Expected disposition of patient:  Pending results, admitted.        Additional Assessment and results:   I have personally performed a face to face diagnostic evaluation on this patient. The patient's initial evaluation and plan have been discussed with the prior physician who initially evaluated the patient. Nursing Notes, Past Medical Hx, Past Surgical Hx, Social Hx, Allergies, vital signs and Family Hx were all reviewed.      Vitals:    03/07/25 1740   BP:    Pulse:    Resp: 18   Temp:    SpO2:      Physical Exam  Vitals and nursing note reviewed.   Constitutional:       General: She is awake. She is not in acute distress.     Appearance: She is not ill-appearing, toxic-appearing or diaphoretic.   HENT:      Head: Normocephalic and atraumatic.   Eyes:      Conjunctiva/sclera: Conjunctivae normal.   Pulmonary:      Effort: Pulmonary effort is normal. No tachypnea, bradypnea, accessory muscle usage, prolonged expiration or respiratory distress.   Musculoskeletal:      Cervical back: Full passive range of motion without pain.   Neurological:      Mental Status: She is alert and easily aroused.   Psychiatric:         Mood and Affect: Affect is tearful.         Labs Reviewed   CBC WITH AUTO DIFFERENTIAL - Abnormal; Notable for the following components:       Result Value    RBC 4.02 (*)     MCHC 31.7 (*)     All other components within normal limits   BASIC METABOLIC PANEL - Abnormal; Notable for the following components:    Glucose 239 (*)     Creatinine 1.1 (*)     All other components

## 2025-03-08 NOTE — PROGRESS NOTES
Patient is seen sitting on the edge of her bed. She is very tearful. Patient reports 'everyone lied to me today'. 'My  was supposed to come and see me'. Patient states 'I don't know ' when asked if she was suicidal. Patient was cooperative with this clinician.     Consulted with ER staff . Consulted with Dr. Lobo concerning the mental status of patient. Patient is referred back to [facility]. ER staff updated on plan of care.

## 2025-03-08 NOTE — DISCHARGE INSTRUCTIONS
Follow up with your primary care physician regarding your symptoms.       If you feel started having once again suicidal thoughts with a plan to hurt yourself/others, please come back to the emergency department or call 911 for evaluation.

## 2025-03-09 LAB
BACTERIA UR CULT: ABNORMAL
ORGANISM: ABNORMAL

## 2025-03-10 VITALS
WEIGHT: 102.4 LBS | BODY MASS INDEX: 22.16 KG/M2 | HEART RATE: 70 BPM | DIASTOLIC BLOOD PRESSURE: 68 MMHG | RESPIRATION RATE: 16 BRPM | SYSTOLIC BLOOD PRESSURE: 118 MMHG | OXYGEN SATURATION: 97 % | TEMPERATURE: 97.6 F

## 2025-03-10 NOTE — PROGRESS NOTES
Ina Cotter is a 74 y.o. female who presents today for her medical conditions/complaints as noted below.   Chief Complaint   Patient presents with    1 Month Follow-Up     F/u chronic medical conditions       HPI:     Ina Cotter is a 75 yo female who was seen today for follow up of her chornic medical conditions including dementia, T2DM with neuropathy, ASHD, HLD, hypothyroidism, GERD, CKD, lumbar DDD, anxiety, and depression.  She was admitted here on 25.  She was having hallucinations at home so was prescribed Zyprexa by her PCP but never started it.  Staff report that she has been hearing and seeing people who are not there.  She's also had elevated blood sugars so was started on 10 units of Lantus which was increased to 20 units daily on .  Her BGs continue to fluctuate quite a bit but are better overall.  Her BP has been controlled.  Today she was seen in the [facility] area and was agitated that her daughter was not there.  She was constantly watching the door waiting for her to come.  Will increase her Buspar dose.      Past Medical History:   Diagnosis Date    Abnormal ultrasound cardiogram     large global hypokinesis EF35-40%    Anxiety and depression     CKD (chronic kidney disease)     Dementia (HCC)     GERD (gastroesophageal reflux disease)     Hyperlipidemia     Hypertension     Hypothyroidism     Neuropathy     Type II or unspecified type diabetes mellitus without mention of complication, not stated as uncontrolled     Diagnosed       Past Surgical History:   Procedure Laterality Date    CARDIOVASCULAR STRESS TEST  2013    WNL    CATARACT REMOVAL      bilat     SECTION  .    CHOLECYSTECTOMY  1980    NECK SURGERY  2011    VITRECTOMY  2009    Rt eye       Family History   Problem Relation Age of Onset    Emphysema Mother     Other Father         pancreatitis    Diabetes Sister     Hypertension Sister     High Cholesterol Sister     Heart Disease Brother

## 2025-03-12 ENCOUNTER — OUTSIDE SERVICES (OUTPATIENT)
Dept: FAMILY MEDICINE CLINIC | Age: 75
End: 2025-03-12
Payer: MEDICARE

## 2025-03-12 DIAGNOSIS — E03.9 HYPOTHYROIDISM, UNSPECIFIED TYPE: ICD-10-CM

## 2025-03-12 DIAGNOSIS — I25.10 ASHD (ARTERIOSCLEROTIC HEART DISEASE): ICD-10-CM

## 2025-03-12 DIAGNOSIS — E11.69 TYPE 2 DIABETES MELLITUS WITH OTHER SPECIFIED COMPLICATION, WITH LONG-TERM CURRENT USE OF INSULIN (HCC): ICD-10-CM

## 2025-03-12 DIAGNOSIS — G30.1 LATE ONSET ALZHEIMER'S DEMENTIA WITH OTHER BEHAVIORAL DISTURBANCE, UNSPECIFIED DEMENTIA SEVERITY (HCC): Primary | ICD-10-CM

## 2025-03-12 DIAGNOSIS — M51.369 DEGENERATION OF INTERVERTEBRAL DISC OF LUMBAR REGION, UNSPECIFIED WHETHER PAIN PRESENT: ICD-10-CM

## 2025-03-12 DIAGNOSIS — K21.9 GASTROESOPHAGEAL REFLUX DISEASE, UNSPECIFIED WHETHER ESOPHAGITIS PRESENT: ICD-10-CM

## 2025-03-12 DIAGNOSIS — E78.5 HYPERLIPIDEMIA, UNSPECIFIED HYPERLIPIDEMIA TYPE: ICD-10-CM

## 2025-03-12 DIAGNOSIS — F02.818 LATE ONSET ALZHEIMER'S DEMENTIA WITH OTHER BEHAVIORAL DISTURBANCE, UNSPECIFIED DEMENTIA SEVERITY (HCC): Primary | ICD-10-CM

## 2025-03-12 DIAGNOSIS — Z79.4 TYPE 2 DIABETES MELLITUS WITH OTHER SPECIFIED COMPLICATION, WITH LONG-TERM CURRENT USE OF INSULIN (HCC): ICD-10-CM

## 2025-03-12 DIAGNOSIS — G62.9 NEUROPATHY: ICD-10-CM

## 2025-03-12 DIAGNOSIS — F41.9 ANXIETY: ICD-10-CM

## 2025-03-12 DIAGNOSIS — I10 ESSENTIAL HYPERTENSION: ICD-10-CM

## 2025-03-12 DIAGNOSIS — N18.4 STAGE 4 CHRONIC KIDNEY DISEASE (HCC): ICD-10-CM

## 2025-03-12 DIAGNOSIS — F32.A DEPRESSION, UNSPECIFIED DEPRESSION TYPE: ICD-10-CM

## 2025-03-12 PROCEDURE — 99309 SBSQ NF CARE MODERATE MDM 30: CPT | Performed by: FAMILY MEDICINE

## 2025-03-12 PROCEDURE — 99491 CHRNC CARE MGMT PHYS 1ST 30: CPT | Performed by: FAMILY MEDICINE

## 2025-03-17 ENCOUNTER — RESULTS FOLLOW-UP (OUTPATIENT)
Dept: FAMILY MEDICINE CLINIC | Age: 75
End: 2025-03-17

## 2025-07-15 NOTE — PROGRESS NOTES
Ina Cotter is a 74 y.o. female who presents today for her medical conditions/complaints as noted below.   Chief Complaint   Patient presents with    1 Month Follow-Up     F/u chronic medical conditions       HPI:     Ina Cotter is a 75 yo female who was seen today for follow up of her chornic medical conditions including dementia, T2DM with neuropathy, ASHD, HLD, hypothyroidism, GERD, CKD, lumbar DDD, anxiety, and depression.  Patient's record is reviewed including nursing notes, vital signs, medications, assessments and discussion with nursing staff.  Patient's previous note was reviewed today. She has been on Buspar, Cymbalta, and Xanax for her mood.  Her weight is up 6 lbs since last month although she often refuses meals.  She occasionally becomes combative, especially when her family leaves.  Today she was seen in [facility] while playing [employer].  She was minimally conversant.        Past Medical History:   Diagnosis Date    Abnormal ultrasound cardiogram     large global hypokinesis EF35-40%    Anxiety and depression     CKD (chronic kidney disease)     Dementia (HCC)     GERD (gastroesophageal reflux disease)     Hyperlipidemia     Hypertension     Hypothyroidism     Neuropathy     Type II or unspecified type diabetes mellitus without mention of complication, not stated as uncontrolled     Diagnosed       Past Surgical History:   Procedure Laterality Date    CARDIOVASCULAR STRESS TEST  2013    WNL    CATARACT REMOVAL      bilat     SECTION  .1979    CHOLECYSTECTOMY  1980    NECK SURGERY  2011    VITRECTOMY  2009    Rt eye       Family History   Problem Relation Age of Onset    Emphysema Mother     Other Father         pancreatitis    Diabetes Sister     Hypertension Sister     High Cholesterol Sister     Heart Disease Brother     Kidney Disease Brother     Diabetes Brother     Other Brother         MVA       Social History     Tobacco Use    Smoking status: Never    Smokeless

## 2025-07-16 ENCOUNTER — OUTSIDE SERVICES (OUTPATIENT)
Dept: FAMILY MEDICINE CLINIC | Age: 75
End: 2025-07-16
Payer: MEDICARE

## 2025-07-16 VITALS
HEART RATE: 78 BPM | OXYGEN SATURATION: 97 % | SYSTOLIC BLOOD PRESSURE: 116 MMHG | DIASTOLIC BLOOD PRESSURE: 76 MMHG | WEIGHT: 109.4 LBS | RESPIRATION RATE: 18 BRPM | BODY MASS INDEX: 23.67 KG/M2 | TEMPERATURE: 97.8 F

## 2025-07-16 DIAGNOSIS — Z79.4 TYPE 2 DIABETES MELLITUS WITH OTHER SPECIFIED COMPLICATION, WITH LONG-TERM CURRENT USE OF INSULIN (HCC): ICD-10-CM

## 2025-07-16 DIAGNOSIS — F41.9 ANXIETY: ICD-10-CM

## 2025-07-16 DIAGNOSIS — K21.9 GASTROESOPHAGEAL REFLUX DISEASE, UNSPECIFIED WHETHER ESOPHAGITIS PRESENT: ICD-10-CM

## 2025-07-16 DIAGNOSIS — E11.69 TYPE 2 DIABETES MELLITUS WITH OTHER SPECIFIED COMPLICATION, WITH LONG-TERM CURRENT USE OF INSULIN (HCC): ICD-10-CM

## 2025-07-16 DIAGNOSIS — G62.9 NEUROPATHY: ICD-10-CM

## 2025-07-16 DIAGNOSIS — E78.5 HYPERLIPIDEMIA, UNSPECIFIED HYPERLIPIDEMIA TYPE: ICD-10-CM

## 2025-07-16 DIAGNOSIS — F02.818 LATE ONSET ALZHEIMER'S DEMENTIA WITH OTHER BEHAVIORAL DISTURBANCE, UNSPECIFIED DEMENTIA SEVERITY (HCC): Primary | ICD-10-CM

## 2025-07-16 DIAGNOSIS — I25.10 ASHD (ARTERIOSCLEROTIC HEART DISEASE): ICD-10-CM

## 2025-07-16 DIAGNOSIS — I10 ESSENTIAL HYPERTENSION: ICD-10-CM

## 2025-07-16 DIAGNOSIS — E03.9 HYPOTHYROIDISM, UNSPECIFIED TYPE: ICD-10-CM

## 2025-07-16 DIAGNOSIS — N18.4 STAGE 4 CHRONIC KIDNEY DISEASE (HCC): ICD-10-CM

## 2025-07-16 DIAGNOSIS — G30.1 LATE ONSET ALZHEIMER'S DEMENTIA WITH OTHER BEHAVIORAL DISTURBANCE, UNSPECIFIED DEMENTIA SEVERITY (HCC): Primary | ICD-10-CM

## 2025-07-16 DIAGNOSIS — M51.369 DEGENERATION OF INTERVERTEBRAL DISC OF LUMBAR REGION, UNSPECIFIED WHETHER PAIN PRESENT: ICD-10-CM

## 2025-07-16 DIAGNOSIS — F32.A DEPRESSION, UNSPECIFIED DEPRESSION TYPE: ICD-10-CM

## 2025-07-16 PROCEDURE — 99491 CHRNC CARE MGMT PHYS 1ST 30: CPT | Performed by: FAMILY MEDICINE

## 2025-07-16 PROCEDURE — 99309 SBSQ NF CARE MODERATE MDM 30: CPT | Performed by: FAMILY MEDICINE

## 2025-08-09 ENCOUNTER — APPOINTMENT (OUTPATIENT)
Dept: CT IMAGING | Age: 75
DRG: 689 | End: 2025-08-09
Payer: MEDICARE

## 2025-08-09 ENCOUNTER — APPOINTMENT (OUTPATIENT)
Dept: GENERAL RADIOLOGY | Age: 75
DRG: 689 | End: 2025-08-09
Payer: MEDICARE

## 2025-08-09 ENCOUNTER — HOSPITAL ENCOUNTER (INPATIENT)
Age: 75
LOS: 2 days | Discharge: NURSING, REHAB OR LONG-TERM CARE | DRG: 689 | End: 2025-08-14
Attending: STUDENT IN AN ORGANIZED HEALTH CARE EDUCATION/TRAINING PROGRAM | Admitting: STUDENT IN AN ORGANIZED HEALTH CARE EDUCATION/TRAINING PROGRAM
Payer: MEDICARE

## 2025-08-09 DIAGNOSIS — R45.1 AGITATION: ICD-10-CM

## 2025-08-09 DIAGNOSIS — G47.00 INSOMNIA, UNSPECIFIED TYPE: ICD-10-CM

## 2025-08-09 DIAGNOSIS — R41.0 DELIRIUM: ICD-10-CM

## 2025-08-09 DIAGNOSIS — N30.01 ACUTE CYSTITIS WITH HEMATURIA: Primary | ICD-10-CM

## 2025-08-09 DIAGNOSIS — R41.82 ALTERED MENTAL STATUS, UNSPECIFIED ALTERED MENTAL STATUS TYPE: ICD-10-CM

## 2025-08-09 LAB
ALBUMIN SERPL BCG-MCNC: 3.4 G/DL (ref 3.4–4.9)
ALP SERPL-CCNC: 114 U/L (ref 38–126)
ALT SERPL W/O P-5'-P-CCNC: 53 U/L (ref 10–35)
ANION GAP SERPL CALC-SCNC: 19 MEQ/L (ref 8–16)
AST SERPL-CCNC: 96 U/L (ref 10–35)
BACTERIA URNS QL MICRO: ABNORMAL /HPF
BASE EXCESS BLDA CALC-SCNC: 6.9 MMOL/L (ref -2–3)
BASOPHILS ABSOLUTE: 0.1 THOU/MM3 (ref 0–0.1)
BASOPHILS NFR BLD AUTO: 0.9 %
BILIRUB SERPL-MCNC: 0.8 MG/DL (ref 0.3–1.2)
BILIRUB UR QL STRIP.AUTO: NEGATIVE
BUN SERPL-MCNC: 49 MG/DL (ref 8–23)
CALCIUM SERPL-MCNC: 10.1 MG/DL (ref 8.8–10.2)
CASTS #/AREA URNS LPF: ABNORMAL /LPF
CASTS 2: ABNORMAL /LPF
CHARACTER UR: CLEAR
CHLORIDE SERPL-SCNC: 101 MEQ/L (ref 98–111)
CO2 SERPL-SCNC: 23 MEQ/L (ref 22–29)
COLLECTED BY:: ABNORMAL
COLOR, UA: YELLOW
CREAT SERPL-MCNC: 1.5 MG/DL (ref 0.5–0.9)
CRYSTALS URNS MICRO: ABNORMAL
DEPRECATED RDW RBC AUTO: 51.2 FL (ref 35–45)
DEVICE: ABNORMAL
EOSINOPHIL NFR BLD AUTO: 0 %
EOSINOPHILS ABSOLUTE: 0 THOU/MM3 (ref 0–0.4)
EPITHELIAL CELLS, UA: ABNORMAL /HPF
ERYTHROCYTE [DISTWIDTH] IN BLOOD BY AUTOMATED COUNT: 14.1 % (ref 11.5–14.5)
GFR SERPL CREATININE-BSD FRML MDRD: 36 ML/MIN/1.73M2
GLUCOSE SERPL-MCNC: 321 MG/DL (ref 74–109)
GLUCOSE UR QL STRIP.AUTO: >= 1000 MG/DL
HCO3 BLDA-SCNC: 28 MMOL/L (ref 23–28)
HCT VFR BLD AUTO: 41.4 % (ref 37–47)
HGB BLD-MCNC: 13.2 GM/DL (ref 12–16)
HGB UR QL STRIP.AUTO: ABNORMAL
IMM GRANULOCYTES # BLD AUTO: 0.29 THOU/MM3 (ref 0–0.07)
IMM GRANULOCYTES NFR BLD AUTO: 2 %
KETONES UR QL STRIP.AUTO: ABNORMAL
LYMPHOCYTES ABSOLUTE: 0.8 THOU/MM3 (ref 1–4.8)
LYMPHOCYTES NFR BLD AUTO: 5.6 %
MCH RBC QN AUTO: 31.3 PG (ref 26–33)
MCHC RBC AUTO-ENTMCNC: 31.9 GM/DL (ref 32.2–35.5)
MCV RBC AUTO: 98.1 FL (ref 81–99)
MISCELLANEOUS 2: ABNORMAL
MONOCYTES ABSOLUTE: 1.1 THOU/MM3 (ref 0.4–1.3)
MONOCYTES NFR BLD AUTO: 7.8 %
NEUTROPHILS ABSOLUTE: 11.9 THOU/MM3 (ref 1.8–7.7)
NEUTROPHILS NFR BLD AUTO: 83.7 %
NITRITE UR QL STRIP: NEGATIVE
NRBC BLD AUTO-RTO: 0 /100 WBC
OSMOLALITY SERPL CALC.SUM OF ELEC: 310.3 MOSMOL/KG (ref 275–300)
PCO2 TEMP ADJ BLDMV: 30 MMHG (ref 41–51)
PH BLDMV: 7.58 [PH] (ref 7.31–7.41)
PH UR STRIP.AUTO: 6.5 [PH] (ref 5–9)
PLATELET # BLD AUTO: 171 THOU/MM3 (ref 130–400)
PMV BLD AUTO: 11.4 FL (ref 9.4–12.4)
PO2 BLDMV: 205 MMHG (ref 25–40)
POTASSIUM SERPL-SCNC: 4.1 MEQ/L (ref 3.5–5.2)
PROT SERPL-MCNC: 6.9 G/DL (ref 6.4–8.3)
PROT UR STRIP.AUTO-MCNC: NEGATIVE MG/DL
RBC # BLD AUTO: 4.22 MILL/MM3 (ref 4.2–5.4)
RBC URINE: ABNORMAL /HPF
RENAL EPI CELLS #/AREA URNS HPF: ABNORMAL /[HPF]
SAO2 % BLDMV: 100 %
SITE: ABNORMAL
SODIUM SERPL-SCNC: 143 MEQ/L (ref 135–145)
SP GR UR REFRACT.AUTO: <= 1.005 (ref 1–1.03)
TROPONIN, HIGH SENSITIVITY: 46 NG/L (ref 0–12)
TROPONIN, HIGH SENSITIVITY: 50 NG/L (ref 0–12)
TSH SERPL DL<=0.05 MIU/L-ACNC: 3.75 UIU/ML (ref 0.27–4.2)
UROBILINOGEN, URINE: 2 EU/DL (ref 0–1)
VENTILATION MODE VENT: ABNORMAL
WBC # BLD AUTO: 14.2 THOU/MM3 (ref 4.8–10.8)
WBC #/AREA URNS HPF: ABNORMAL /HPF
WBC #/AREA URNS HPF: NEGATIVE /[HPF]
YEAST LIKE FUNGI URNS QL MICRO: ABNORMAL

## 2025-08-09 PROCEDURE — 93005 ELECTROCARDIOGRAM TRACING: CPT | Performed by: NURSE PRACTITIONER

## 2025-08-09 PROCEDURE — 81001 URINALYSIS AUTO W/SCOPE: CPT

## 2025-08-09 PROCEDURE — 2500000003 HC RX 250 WO HCPCS

## 2025-08-09 PROCEDURE — 96372 THER/PROPH/DIAG INJ SC/IM: CPT

## 2025-08-09 PROCEDURE — 6370000000 HC RX 637 (ALT 250 FOR IP)

## 2025-08-09 PROCEDURE — 82803 BLOOD GASES ANY COMBINATION: CPT

## 2025-08-09 PROCEDURE — 6360000002 HC RX W HCPCS: Performed by: NURSE PRACTITIONER

## 2025-08-09 PROCEDURE — 2500000003 HC RX 250 WO HCPCS: Performed by: NURSE PRACTITIONER

## 2025-08-09 PROCEDURE — 71045 X-RAY EXAM CHEST 1 VIEW: CPT

## 2025-08-09 PROCEDURE — 96375 TX/PRO/DX INJ NEW DRUG ADDON: CPT

## 2025-08-09 PROCEDURE — 87040 BLOOD CULTURE FOR BACTERIA: CPT

## 2025-08-09 PROCEDURE — 6360000002 HC RX W HCPCS

## 2025-08-09 PROCEDURE — 99223 1ST HOSP IP/OBS HIGH 75: CPT | Performed by: STUDENT IN AN ORGANIZED HEALTH CARE EDUCATION/TRAINING PROGRAM

## 2025-08-09 PROCEDURE — 6370000000 HC RX 637 (ALT 250 FOR IP): Performed by: STUDENT IN AN ORGANIZED HEALTH CARE EDUCATION/TRAINING PROGRAM

## 2025-08-09 PROCEDURE — 99285 EMERGENCY DEPT VISIT HI MDM: CPT

## 2025-08-09 PROCEDURE — 84443 ASSAY THYROID STIM HORMONE: CPT

## 2025-08-09 PROCEDURE — 87086 URINE CULTURE/COLONY COUNT: CPT

## 2025-08-09 PROCEDURE — G0378 HOSPITAL OBSERVATION PER HR: HCPCS

## 2025-08-09 PROCEDURE — 96374 THER/PROPH/DIAG INJ IV PUSH: CPT

## 2025-08-09 PROCEDURE — 80053 COMPREHEN METABOLIC PANEL: CPT

## 2025-08-09 PROCEDURE — 85025 COMPLETE CBC W/AUTO DIFF WBC: CPT

## 2025-08-09 PROCEDURE — 84484 ASSAY OF TROPONIN QUANT: CPT

## 2025-08-09 PROCEDURE — 36415 COLL VENOUS BLD VENIPUNCTURE: CPT

## 2025-08-09 PROCEDURE — 70450 CT HEAD/BRAIN W/O DYE: CPT

## 2025-08-09 RX ORDER — ONDANSETRON 4 MG/1
4 TABLET, ORALLY DISINTEGRATING ORAL EVERY 8 HOURS PRN
Status: DISCONTINUED | OUTPATIENT
Start: 2025-08-09 | End: 2025-08-14 | Stop reason: HOSPADM

## 2025-08-09 RX ORDER — LEVOTHYROXINE SODIUM 112 UG/1
112 TABLET ORAL DAILY
COMMUNITY

## 2025-08-09 RX ORDER — ONDANSETRON 2 MG/ML
4 INJECTION INTRAMUSCULAR; INTRAVENOUS EVERY 6 HOURS PRN
Status: DISCONTINUED | OUTPATIENT
Start: 2025-08-09 | End: 2025-08-14 | Stop reason: HOSPADM

## 2025-08-09 RX ORDER — DIVALPROEX SODIUM 125 MG/1
250 CAPSULE, COATED PELLETS ORAL 3 TIMES DAILY
Status: ON HOLD | COMMUNITY
End: 2025-08-14 | Stop reason: HOSPADM

## 2025-08-09 RX ORDER — CLOPIDOGREL BISULFATE 75 MG/1
75 TABLET ORAL DAILY
COMMUNITY

## 2025-08-09 RX ORDER — MIDODRINE HYDROCHLORIDE 2.5 MG/1
2.5 TABLET ORAL 3 TIMES DAILY
Status: DISCONTINUED | OUTPATIENT
Start: 2025-08-09 | End: 2025-08-09 | Stop reason: ALTCHOICE

## 2025-08-09 RX ORDER — HYDRALAZINE HYDROCHLORIDE 25 MG/1
25 TABLET, FILM COATED ORAL 3 TIMES DAILY
COMMUNITY

## 2025-08-09 RX ORDER — PANTOPRAZOLE SODIUM 40 MG/1
40 TABLET, DELAYED RELEASE ORAL DAILY
Status: DISCONTINUED | OUTPATIENT
Start: 2025-08-10 | End: 2025-08-14 | Stop reason: HOSPADM

## 2025-08-09 RX ORDER — MEMANTINE HYDROCHLORIDE 5 MG/1
5 TABLET ORAL 2 TIMES DAILY
Status: DISCONTINUED | OUTPATIENT
Start: 2025-08-09 | End: 2025-08-14 | Stop reason: HOSPADM

## 2025-08-09 RX ORDER — DULOXETIN HYDROCHLORIDE 20 MG/1
40 CAPSULE, DELAYED RELEASE ORAL 2 TIMES DAILY
COMMUNITY

## 2025-08-09 RX ORDER — DAPAGLIFLOZIN 5 MG/1
5 TABLET, FILM COATED ORAL EVERY MORNING
COMMUNITY

## 2025-08-09 RX ORDER — ALPRAZOLAM 0.25 MG
0.25 TABLET ORAL NIGHTLY
Status: DISCONTINUED | OUTPATIENT
Start: 2025-08-09 | End: 2025-08-12

## 2025-08-09 RX ORDER — LEVOTHYROXINE SODIUM 112 UG/1
112 TABLET ORAL DAILY
Status: DISCONTINUED | OUTPATIENT
Start: 2025-08-10 | End: 2025-08-14 | Stop reason: HOSPADM

## 2025-08-09 RX ORDER — FLUTICASONE PROPIONATE 50 MCG
1 SPRAY, SUSPENSION (ML) NASAL DAILY
COMMUNITY

## 2025-08-09 RX ORDER — INSULIN LISPRO 100 [IU]/ML
1 INJECTION, SOLUTION INTRAVENOUS; SUBCUTANEOUS
COMMUNITY

## 2025-08-09 RX ORDER — GABAPENTIN 100 MG/1
100 CAPSULE ORAL 3 TIMES DAILY
Status: DISCONTINUED | OUTPATIENT
Start: 2025-08-09 | End: 2025-08-14 | Stop reason: HOSPADM

## 2025-08-09 RX ORDER — HALOPERIDOL 1 MG/1
1 TABLET ORAL EVERY 6 HOURS PRN
Status: DISCONTINUED | OUTPATIENT
Start: 2025-08-09 | End: 2025-08-10

## 2025-08-09 RX ORDER — GABAPENTIN 100 MG/1
100 CAPSULE ORAL 3 TIMES DAILY
COMMUNITY

## 2025-08-09 RX ORDER — LORAZEPAM 2 MG/ML
1 INJECTION INTRAMUSCULAR ONCE
Status: DISCONTINUED | OUTPATIENT
Start: 2025-08-09 | End: 2025-08-09

## 2025-08-09 RX ORDER — TRAZODONE HYDROCHLORIDE 50 MG/1
50 TABLET ORAL NIGHTLY
Status: DISCONTINUED | OUTPATIENT
Start: 2025-08-09 | End: 2025-08-14 | Stop reason: HOSPADM

## 2025-08-09 RX ORDER — INSULIN GLARGINE 100 [IU]/ML
20 INJECTION, SOLUTION SUBCUTANEOUS NIGHTLY
COMMUNITY

## 2025-08-09 RX ORDER — SODIUM CHLORIDE 0.9 % (FLUSH) 0.9 %
5-40 SYRINGE (ML) INJECTION EVERY 12 HOURS SCHEDULED
Status: DISCONTINUED | OUTPATIENT
Start: 2025-08-09 | End: 2025-08-14 | Stop reason: HOSPADM

## 2025-08-09 RX ORDER — SODIUM CHLORIDE 0.9 % (FLUSH) 0.9 %
5-40 SYRINGE (ML) INJECTION PRN
Status: DISCONTINUED | OUTPATIENT
Start: 2025-08-09 | End: 2025-08-14 | Stop reason: HOSPADM

## 2025-08-09 RX ORDER — ACETAMINOPHEN 325 MG/1
650 TABLET ORAL EVERY 6 HOURS PRN
Status: DISCONTINUED | OUTPATIENT
Start: 2025-08-09 | End: 2025-08-14 | Stop reason: HOSPADM

## 2025-08-09 RX ORDER — RIVASTIGMINE TARTRATE 1.5 MG/1
6 CAPSULE ORAL 2 TIMES DAILY
Status: DISCONTINUED | OUTPATIENT
Start: 2025-08-09 | End: 2025-08-14 | Stop reason: HOSPADM

## 2025-08-09 RX ORDER — BUSPIRONE HYDROCHLORIDE 5 MG/1
5 TABLET ORAL 3 TIMES DAILY
COMMUNITY

## 2025-08-09 RX ORDER — DIAZEPAM 10 MG/2ML
2 INJECTION, SOLUTION INTRAMUSCULAR; INTRAVENOUS ONCE
Status: COMPLETED | OUTPATIENT
Start: 2025-08-09 | End: 2025-08-09

## 2025-08-09 RX ORDER — FERROUS SULFATE 325(65) MG
325 TABLET ORAL
Status: DISCONTINUED | OUTPATIENT
Start: 2025-08-10 | End: 2025-08-14 | Stop reason: HOSPADM

## 2025-08-09 RX ORDER — SODIUM CHLORIDE 9 MG/ML
INJECTION, SOLUTION INTRAVENOUS PRN
Status: DISCONTINUED | OUTPATIENT
Start: 2025-08-09 | End: 2025-08-14 | Stop reason: HOSPADM

## 2025-08-09 RX ORDER — POLYETHYLENE GLYCOL 3350 17 G/17G
17 POWDER, FOR SOLUTION ORAL DAILY PRN
Status: DISCONTINUED | OUTPATIENT
Start: 2025-08-09 | End: 2025-08-14 | Stop reason: HOSPADM

## 2025-08-09 RX ORDER — CETIRIZINE HYDROCHLORIDE 10 MG/1
5 TABLET ORAL DAILY
Status: DISCONTINUED | OUTPATIENT
Start: 2025-08-10 | End: 2025-08-14 | Stop reason: HOSPADM

## 2025-08-09 RX ORDER — HEPARIN SODIUM 5000 [USP'U]/ML
5000 INJECTION, SOLUTION INTRAVENOUS; SUBCUTANEOUS 2 TIMES DAILY
Status: DISCONTINUED | OUTPATIENT
Start: 2025-08-09 | End: 2025-08-14 | Stop reason: HOSPADM

## 2025-08-09 RX ORDER — LANOLIN ALCOHOL/MO/W.PET/CERES
400 CREAM (GRAM) TOPICAL DAILY
Status: DISCONTINUED | OUTPATIENT
Start: 2025-08-10 | End: 2025-08-14 | Stop reason: HOSPADM

## 2025-08-09 RX ORDER — POLYETHYLENE GLYCOL 3350 17 G/17G
17 POWDER, FOR SOLUTION ORAL DAILY PRN
COMMUNITY

## 2025-08-09 RX ORDER — ACETAMINOPHEN 650 MG/1
650 SUPPOSITORY RECTAL EVERY 6 HOURS PRN
Status: DISCONTINUED | OUTPATIENT
Start: 2025-08-09 | End: 2025-08-14 | Stop reason: HOSPADM

## 2025-08-09 RX ADMIN — GABAPENTIN 100 MG: 100 CAPSULE ORAL at 21:19

## 2025-08-09 RX ADMIN — WATER 1000 MG: 1 INJECTION INTRAMUSCULAR; INTRAVENOUS; SUBCUTANEOUS at 15:28

## 2025-08-09 RX ADMIN — HEPARIN SODIUM 5000 UNITS: 5000 INJECTION INTRAVENOUS; SUBCUTANEOUS at 21:15

## 2025-08-09 RX ADMIN — HALOPERIDOL 1 MG: 1 TABLET ORAL at 22:53

## 2025-08-09 RX ADMIN — RIVASTIGMINE TARTRATE 6 MG: 1.5 CAPSULE ORAL at 21:18

## 2025-08-09 RX ADMIN — SODIUM CHLORIDE, PRESERVATIVE FREE 10 ML: 5 INJECTION INTRAVENOUS at 20:22

## 2025-08-09 RX ADMIN — MEMANTINE 5 MG: 5 TABLET ORAL at 21:19

## 2025-08-09 RX ADMIN — DIAZEPAM 2 MG: 5 INJECTION, SOLUTION INTRAMUSCULAR; INTRAVENOUS at 14:08

## 2025-08-09 RX ADMIN — Medication 4.5 MG: at 21:15

## 2025-08-09 ASSESSMENT — PAIN SCALES - WONG BAKER
WONGBAKER_NUMERICALRESPONSE: NO HURT

## 2025-08-09 ASSESSMENT — PAIN - FUNCTIONAL ASSESSMENT: PAIN_FUNCTIONAL_ASSESSMENT: WONG-BAKER FACES

## 2025-08-10 PROBLEM — N30.01 ACUTE CYSTITIS WITH HEMATURIA: Status: ACTIVE | Noted: 2025-08-10

## 2025-08-10 LAB
BACTERIA UR CULT: ABNORMAL
EKG ATRIAL RATE: 81 BPM
EKG ATRIAL RATE: 82 BPM
EKG ATRIAL RATE: 85 BPM
EKG P AXIS: 32 DEGREES
EKG P AXIS: 55 DEGREES
EKG P AXIS: 65 DEGREES
EKG P-R INTERVAL: 158 MS
EKG P-R INTERVAL: 168 MS
EKG P-R INTERVAL: 172 MS
EKG Q-T INTERVAL: 394 MS
EKG Q-T INTERVAL: 406 MS
EKG Q-T INTERVAL: 430 MS
EKG QRS DURATION: 128 MS
EKG QRS DURATION: 130 MS
EKG QRS DURATION: 144 MS
EKG QTC CALCULATION (BAZETT): 468 MS
EKG QTC CALCULATION (BAZETT): 471 MS
EKG QTC CALCULATION (BAZETT): 502 MS
EKG R AXIS: 12 DEGREES
EKG R AXIS: 17 DEGREES
EKG T AXIS: 159 DEGREES
EKG T AXIS: 172 DEGREES
EKG T AXIS: 173 DEGREES
EKG VENTRICULAR RATE: 81 BPM
EKG VENTRICULAR RATE: 82 BPM
EKG VENTRICULAR RATE: 85 BPM
ORGANISM: ABNORMAL

## 2025-08-10 PROCEDURE — 6360000002 HC RX W HCPCS

## 2025-08-10 PROCEDURE — 2500000003 HC RX 250 WO HCPCS

## 2025-08-10 PROCEDURE — 6370000000 HC RX 637 (ALT 250 FOR IP)

## 2025-08-10 PROCEDURE — 93005 ELECTROCARDIOGRAM TRACING: CPT

## 2025-08-10 PROCEDURE — G0378 HOSPITAL OBSERVATION PER HR: HCPCS

## 2025-08-10 PROCEDURE — 96375 TX/PRO/DX INJ NEW DRUG ADDON: CPT

## 2025-08-10 PROCEDURE — 96376 TX/PRO/DX INJ SAME DRUG ADON: CPT

## 2025-08-10 PROCEDURE — 93010 ELECTROCARDIOGRAM REPORT: CPT | Performed by: INTERNAL MEDICINE

## 2025-08-10 PROCEDURE — 6360000002 HC RX W HCPCS: Performed by: PHYSICIAN ASSISTANT

## 2025-08-10 PROCEDURE — 96372 THER/PROPH/DIAG INJ SC/IM: CPT

## 2025-08-10 PROCEDURE — 6370000000 HC RX 637 (ALT 250 FOR IP): Performed by: STUDENT IN AN ORGANIZED HEALTH CARE EDUCATION/TRAINING PROGRAM

## 2025-08-10 RX ORDER — DIPHENHYDRAMINE HYDROCHLORIDE 50 MG/ML
25 INJECTION, SOLUTION INTRAMUSCULAR; INTRAVENOUS ONCE
Status: COMPLETED | OUTPATIENT
Start: 2025-08-10 | End: 2025-08-10

## 2025-08-10 RX ORDER — MIDAZOLAM HYDROCHLORIDE 2 MG/2ML
1 INJECTION, SOLUTION INTRAMUSCULAR; INTRAVENOUS ONCE
Status: COMPLETED | OUTPATIENT
Start: 2025-08-10 | End: 2025-08-10

## 2025-08-10 RX ORDER — HALOPERIDOL 2 MG/1
2 TABLET ORAL EVERY 6 HOURS PRN
Status: DISCONTINUED | OUTPATIENT
Start: 2025-08-10 | End: 2025-08-14 | Stop reason: HOSPADM

## 2025-08-10 RX ORDER — DIPHENHYDRAMINE HYDROCHLORIDE 50 MG/ML
50 INJECTION, SOLUTION INTRAMUSCULAR; INTRAVENOUS ONCE
Status: COMPLETED | OUTPATIENT
Start: 2025-08-10 | End: 2025-08-10

## 2025-08-10 RX ORDER — HALOPERIDOL 5 MG/ML
5 INJECTION INTRAMUSCULAR EVERY 6 HOURS PRN
Status: DISCONTINUED | OUTPATIENT
Start: 2025-08-10 | End: 2025-08-10

## 2025-08-10 RX ORDER — HALOPERIDOL 5 MG/ML
5 INJECTION INTRAMUSCULAR ONCE
Status: COMPLETED | OUTPATIENT
Start: 2025-08-10 | End: 2025-08-10

## 2025-08-10 RX ORDER — OLANZAPINE 2.5 MG/1
2.5 TABLET, FILM COATED ORAL NIGHTLY
Status: DISCONTINUED | OUTPATIENT
Start: 2025-08-10 | End: 2025-08-12

## 2025-08-10 RX ORDER — MIDAZOLAM HYDROCHLORIDE 2 MG/2ML
0.5 INJECTION, SOLUTION INTRAMUSCULAR; INTRAVENOUS ONCE
Status: DISCONTINUED | OUTPATIENT
Start: 2025-08-10 | End: 2025-08-10

## 2025-08-10 RX ORDER — HALOPERIDOL 5 MG/ML
2 INJECTION INTRAMUSCULAR EVERY 6 HOURS PRN
Status: DISCONTINUED | OUTPATIENT
Start: 2025-08-10 | End: 2025-08-12

## 2025-08-10 RX ORDER — MIDAZOLAM HYDROCHLORIDE 2 MG/2ML
0.5 INJECTION, SOLUTION INTRAMUSCULAR; INTRAVENOUS
Status: COMPLETED | OUTPATIENT
Start: 2025-08-10 | End: 2025-08-10

## 2025-08-10 RX ORDER — HALOPERIDOL 5 MG/ML
5 INJECTION INTRAMUSCULAR ONCE
Status: DISCONTINUED | OUTPATIENT
Start: 2025-08-10 | End: 2025-08-10

## 2025-08-10 RX ORDER — HALOPERIDOL 5 MG/ML
2 INJECTION INTRAMUSCULAR EVERY 6 HOURS PRN
Status: DISCONTINUED | OUTPATIENT
Start: 2025-08-10 | End: 2025-08-10

## 2025-08-10 RX ADMIN — HEPARIN SODIUM 5000 UNITS: 5000 INJECTION INTRAVENOUS; SUBCUTANEOUS at 20:29

## 2025-08-10 RX ADMIN — Medication 4.5 MG: at 19:52

## 2025-08-10 RX ADMIN — RIVASTIGMINE TARTRATE 6 MG: 1.5 CAPSULE ORAL at 20:30

## 2025-08-10 RX ADMIN — PANTOPRAZOLE SODIUM 40 MG: 40 TABLET, DELAYED RELEASE ORAL at 08:08

## 2025-08-10 RX ADMIN — HALOPERIDOL LACTATE 2 MG: 5 INJECTION, SOLUTION INTRAMUSCULAR at 07:25

## 2025-08-10 RX ADMIN — RIVASTIGMINE TARTRATE 6 MG: 1.5 CAPSULE ORAL at 09:55

## 2025-08-10 RX ADMIN — DIPHENHYDRAMINE HYDROCHLORIDE 50 MG: 50 INJECTION INTRAMUSCULAR; INTRAVENOUS at 20:00

## 2025-08-10 RX ADMIN — DIPHENHYDRAMINE HYDROCHLORIDE 50 MG: 50 INJECTION INTRAMUSCULAR; INTRAVENOUS at 00:22

## 2025-08-10 RX ADMIN — OLANZAPINE 2.5 MG: 2.5 TABLET, FILM COATED ORAL at 20:30

## 2025-08-10 RX ADMIN — DIPHENHYDRAMINE HYDROCHLORIDE 25 MG: 50 INJECTION INTRAMUSCULAR; INTRAVENOUS at 04:35

## 2025-08-10 RX ADMIN — SODIUM CHLORIDE, PRESERVATIVE FREE 10 ML: 5 INJECTION INTRAVENOUS at 08:11

## 2025-08-10 RX ADMIN — WATER 1000 MG: 1 INJECTION INTRAMUSCULAR; INTRAVENOUS; SUBCUTANEOUS at 18:16

## 2025-08-10 RX ADMIN — MIDAZOLAM HYDROCHLORIDE 1 MG: 1 INJECTION, SOLUTION INTRAMUSCULAR; INTRAVENOUS at 20:00

## 2025-08-10 RX ADMIN — LEVOTHYROXINE SODIUM 112 MCG: 0.11 TABLET ORAL at 09:55

## 2025-08-10 RX ADMIN — HEPARIN SODIUM 5000 UNITS: 5000 INJECTION INTRAVENOUS; SUBCUTANEOUS at 08:11

## 2025-08-10 RX ADMIN — CETIRIZINE HYDROCHLORIDE 5 MG: 10 TABLET, FILM COATED ORAL at 08:08

## 2025-08-10 RX ADMIN — HALOPERIDOL LACTATE 2 MG: 5 INJECTION, SOLUTION INTRAMUSCULAR at 13:38

## 2025-08-10 RX ADMIN — MIDAZOLAM HYDROCHLORIDE 0.5 MG: 1 INJECTION, SOLUTION INTRAMUSCULAR; INTRAVENOUS at 22:21

## 2025-08-10 RX ADMIN — GABAPENTIN 100 MG: 100 CAPSULE ORAL at 08:08

## 2025-08-10 RX ADMIN — MIDAZOLAM HYDROCHLORIDE 1 MG: 1 INJECTION, SOLUTION INTRAMUSCULAR; INTRAVENOUS at 08:08

## 2025-08-10 RX ADMIN — HALOPERIDOL LACTATE 2 MG: 5 INJECTION, SOLUTION INTRAMUSCULAR at 18:37

## 2025-08-10 RX ADMIN — MIDAZOLAM HYDROCHLORIDE 1 MG: 1 INJECTION, SOLUTION INTRAMUSCULAR; INTRAVENOUS at 15:43

## 2025-08-10 RX ADMIN — MIDAZOLAM 1 MG: 1 INJECTION INTRAMUSCULAR; INTRAVENOUS at 00:22

## 2025-08-10 RX ADMIN — MEMANTINE 5 MG: 5 TABLET ORAL at 19:52

## 2025-08-10 RX ADMIN — Medication 400 MG: at 08:08

## 2025-08-10 RX ADMIN — SODIUM CHLORIDE, PRESERVATIVE FREE 10 ML: 5 INJECTION INTRAVENOUS at 20:34

## 2025-08-10 RX ADMIN — HALOPERIDOL LACTATE 5 MG: 5 INJECTION, SOLUTION INTRAMUSCULAR at 08:08

## 2025-08-10 RX ADMIN — MIDAZOLAM HYDROCHLORIDE 1 MG: 1 INJECTION, SOLUTION INTRAMUSCULAR; INTRAVENOUS at 04:35

## 2025-08-10 RX ADMIN — MEMANTINE 5 MG: 5 TABLET ORAL at 08:08

## 2025-08-10 RX ADMIN — MIDAZOLAM HYDROCHLORIDE 1 MG: 1 INJECTION, SOLUTION INTRAMUSCULAR; INTRAVENOUS at 05:50

## 2025-08-10 ASSESSMENT — PAIN SCALES - GENERAL: PAINLEVEL_OUTOF10: 0

## 2025-08-11 PROBLEM — F03.90 MAJOR NEUROCOGNITIVE DISORDER (HCC): Status: ACTIVE | Noted: 2025-08-11

## 2025-08-11 LAB
ANION GAP SERPL CALC-SCNC: 14 MEQ/L (ref 8–16)
BACTERIA URNS QL MICRO: ABNORMAL /HPF
BILIRUB UR QL STRIP.AUTO: NEGATIVE
BUN SERPL-MCNC: 43 MG/DL (ref 8–23)
CALCIUM SERPL-MCNC: 9.4 MG/DL (ref 8.8–10.2)
CASTS #/AREA URNS LPF: ABNORMAL /LPF
CASTS 2: ABNORMAL /LPF
CHARACTER UR: CLEAR
CHLORIDE SERPL-SCNC: 99 MEQ/L (ref 98–111)
CO2 SERPL-SCNC: 25 MEQ/L (ref 22–29)
COLOR, UA: YELLOW
CREAT SERPL-MCNC: 1.1 MG/DL (ref 0.5–0.9)
CRYSTALS URNS MICRO: ABNORMAL
DEPRECATED RDW RBC AUTO: 50.4 FL (ref 35–45)
EKG ATRIAL RATE: 65 BPM
EKG P-R INTERVAL: 160 MS
EKG Q-T INTERVAL: 464 MS
EKG QRS DURATION: 138 MS
EKG QTC CALCULATION (BAZETT): 482 MS
EKG R AXIS: 3 DEGREES
EKG T AXIS: 170 DEGREES
EKG VENTRICULAR RATE: 65 BPM
EPITHELIAL CELLS, UA: ABNORMAL /HPF
ERYTHROCYTE [DISTWIDTH] IN BLOOD BY AUTOMATED COUNT: 13.5 % (ref 11.5–14.5)
GFR SERPL CREATININE-BSD FRML MDRD: 52 ML/MIN/1.73M2
GLUCOSE BLD STRIP.AUTO-MCNC: 162 MG/DL (ref 70–108)
GLUCOSE BLD STRIP.AUTO-MCNC: 180 MG/DL (ref 70–108)
GLUCOSE BLD STRIP.AUTO-MCNC: 251 MG/DL (ref 70–108)
GLUCOSE SERPL-MCNC: 300 MG/DL (ref 74–109)
GLUCOSE UR QL STRIP.AUTO: >= 1000 MG/DL
HCT VFR BLD AUTO: 35.6 % (ref 37–47)
HGB BLD-MCNC: 11.1 GM/DL (ref 12–16)
HGB UR QL STRIP.AUTO: NEGATIVE
KETONES UR QL STRIP.AUTO: NEGATIVE
MAGNESIUM SERPL-MCNC: 2 MG/DL (ref 1.6–2.6)
MCH RBC QN AUTO: 31.3 PG (ref 26–33)
MCHC RBC AUTO-ENTMCNC: 31.2 GM/DL (ref 32.2–35.5)
MCV RBC AUTO: 100.3 FL (ref 81–99)
MISCELLANEOUS 2: ABNORMAL
NITRITE UR QL STRIP: NEGATIVE
PH UR STRIP.AUTO: 6 [PH] (ref 5–9)
PLATELET # BLD AUTO: 140 THOU/MM3 (ref 130–400)
PMV BLD AUTO: 10.8 FL (ref 9.4–12.4)
POTASSIUM SERPL-SCNC: 3.6 MEQ/L (ref 3.5–5.2)
PROT UR STRIP.AUTO-MCNC: NEGATIVE MG/DL
RBC # BLD AUTO: 3.55 MILL/MM3 (ref 4.2–5.4)
RBC URINE: ABNORMAL /HPF
RENAL EPI CELLS #/AREA URNS HPF: ABNORMAL /[HPF]
SODIUM SERPL-SCNC: 138 MEQ/L (ref 135–145)
SP GR UR REFRACT.AUTO: 1.01 (ref 1–1.03)
UROBILINOGEN, URINE: 0.2 EU/DL (ref 0–1)
WBC # BLD AUTO: 11 THOU/MM3 (ref 4.8–10.8)
WBC #/AREA URNS HPF: ABNORMAL /HPF
WBC #/AREA URNS HPF: ABNORMAL /[HPF]
YEAST LIKE FUNGI URNS QL MICRO: ABNORMAL

## 2025-08-11 PROCEDURE — 2500000003 HC RX 250 WO HCPCS

## 2025-08-11 PROCEDURE — 96376 TX/PRO/DX INJ SAME DRUG ADON: CPT

## 2025-08-11 PROCEDURE — 6370000000 HC RX 637 (ALT 250 FOR IP)

## 2025-08-11 PROCEDURE — 96372 THER/PROPH/DIAG INJ SC/IM: CPT

## 2025-08-11 PROCEDURE — 6360000002 HC RX W HCPCS

## 2025-08-11 PROCEDURE — 83735 ASSAY OF MAGNESIUM: CPT

## 2025-08-11 PROCEDURE — 6370000000 HC RX 637 (ALT 250 FOR IP): Performed by: STUDENT IN AN ORGANIZED HEALTH CARE EDUCATION/TRAINING PROGRAM

## 2025-08-11 PROCEDURE — 6360000002 HC RX W HCPCS: Performed by: PHYSICIAN ASSISTANT

## 2025-08-11 PROCEDURE — 36415 COLL VENOUS BLD VENIPUNCTURE: CPT

## 2025-08-11 PROCEDURE — 93005 ELECTROCARDIOGRAM TRACING: CPT

## 2025-08-11 PROCEDURE — 80048 BASIC METABOLIC PNL TOTAL CA: CPT

## 2025-08-11 PROCEDURE — 90792 PSYCH DIAG EVAL W/MED SRVCS: CPT | Performed by: PSYCHIATRY & NEUROLOGY

## 2025-08-11 PROCEDURE — 87086 URINE CULTURE/COLONY COUNT: CPT

## 2025-08-11 PROCEDURE — 81001 URINALYSIS AUTO W/SCOPE: CPT

## 2025-08-11 PROCEDURE — 85027 COMPLETE CBC AUTOMATED: CPT

## 2025-08-11 PROCEDURE — 93010 ELECTROCARDIOGRAM REPORT: CPT | Performed by: NUCLEAR MEDICINE

## 2025-08-11 PROCEDURE — 82948 REAGENT STRIP/BLOOD GLUCOSE: CPT

## 2025-08-11 PROCEDURE — G0378 HOSPITAL OBSERVATION PER HR: HCPCS

## 2025-08-11 RX ORDER — CHLORPROMAZINE HYDROCHLORIDE 25 MG/ML
50 INJECTION INTRAMUSCULAR ONCE
Status: COMPLETED | OUTPATIENT
Start: 2025-08-11 | End: 2025-08-11

## 2025-08-11 RX ORDER — DEXTROSE MONOHYDRATE 100 MG/ML
INJECTION, SOLUTION INTRAVENOUS CONTINUOUS PRN
Status: DISCONTINUED | OUTPATIENT
Start: 2025-08-11 | End: 2025-08-14 | Stop reason: HOSPADM

## 2025-08-11 RX ORDER — INSULIN GLARGINE 100 [IU]/ML
10 INJECTION, SOLUTION SUBCUTANEOUS NIGHTLY
Status: DISCONTINUED | OUTPATIENT
Start: 2025-08-11 | End: 2025-08-13

## 2025-08-11 RX ORDER — MENTHOL 40 MG/ML
GEL TOPICAL PRN
COMMUNITY

## 2025-08-11 RX ORDER — GLUCAGON 1 MG/ML
1 KIT INJECTION PRN
Status: DISCONTINUED | OUTPATIENT
Start: 2025-08-11 | End: 2025-08-14 | Stop reason: HOSPADM

## 2025-08-11 RX ORDER — INSULIN LISPRO 100 [IU]/ML
0-8 INJECTION, SOLUTION INTRAVENOUS; SUBCUTANEOUS
Status: DISCONTINUED | OUTPATIENT
Start: 2025-08-11 | End: 2025-08-13

## 2025-08-11 RX ORDER — MAGNESIUM HYDROXIDE/ALUMINUM HYDROXICE/SIMETHICONE 120; 1200; 1200 MG/30ML; MG/30ML; MG/30ML
30 SUSPENSION ORAL EVERY 4 HOURS PRN
COMMUNITY

## 2025-08-11 RX ORDER — MIDAZOLAM HYDROCHLORIDE 2 MG/2ML
0.5 INJECTION, SOLUTION INTRAMUSCULAR; INTRAVENOUS ONCE
Status: COMPLETED | OUTPATIENT
Start: 2025-08-11 | End: 2025-08-11

## 2025-08-11 RX ADMIN — PANTOPRAZOLE SODIUM 40 MG: 40 TABLET, DELAYED RELEASE ORAL at 07:48

## 2025-08-11 RX ADMIN — RIVASTIGMINE TARTRATE 6 MG: 1.5 CAPSULE ORAL at 07:47

## 2025-08-11 RX ADMIN — LEVOTHYROXINE SODIUM 112 MCG: 0.11 TABLET ORAL at 06:24

## 2025-08-11 RX ADMIN — INSULIN GLARGINE 10 UNITS: 100 INJECTION, SOLUTION SUBCUTANEOUS at 21:30

## 2025-08-11 RX ADMIN — SODIUM CHLORIDE, PRESERVATIVE FREE 10 ML: 5 INJECTION INTRAVENOUS at 07:48

## 2025-08-11 RX ADMIN — HALOPERIDOL LACTATE 2 MG: 5 INJECTION, SOLUTION INTRAMUSCULAR at 23:44

## 2025-08-11 RX ADMIN — INSULIN LISPRO 4 UNITS: 100 INJECTION, SOLUTION INTRAVENOUS; SUBCUTANEOUS at 11:18

## 2025-08-11 RX ADMIN — INSULIN LISPRO 2 UNITS: 100 INJECTION, SOLUTION INTRAVENOUS; SUBCUTANEOUS at 16:02

## 2025-08-11 RX ADMIN — SODIUM CHLORIDE, PRESERVATIVE FREE 10 ML: 5 INJECTION INTRAVENOUS at 21:32

## 2025-08-11 RX ADMIN — RIVASTIGMINE TARTRATE 6 MG: 1.5 CAPSULE ORAL at 21:31

## 2025-08-11 RX ADMIN — MIDAZOLAM HYDROCHLORIDE 0.5 MG: 1 INJECTION, SOLUTION INTRAMUSCULAR; INTRAVENOUS at 04:17

## 2025-08-11 RX ADMIN — HALOPERIDOL 2 MG: 2 TABLET ORAL at 07:48

## 2025-08-11 RX ADMIN — HALOPERIDOL LACTATE 2 MG: 5 INJECTION, SOLUTION INTRAMUSCULAR at 17:39

## 2025-08-11 RX ADMIN — Medication 4.5 MG: at 21:30

## 2025-08-11 RX ADMIN — MEMANTINE 5 MG: 5 TABLET ORAL at 21:32

## 2025-08-11 RX ADMIN — HEPARIN SODIUM 5000 UNITS: 5000 INJECTION INTRAVENOUS; SUBCUTANEOUS at 21:30

## 2025-08-11 RX ADMIN — MEMANTINE 5 MG: 5 TABLET ORAL at 07:47

## 2025-08-11 RX ADMIN — HALOPERIDOL LACTATE 2 MG: 5 INJECTION, SOLUTION INTRAMUSCULAR at 11:18

## 2025-08-11 RX ADMIN — Medication 400 MG: at 07:47

## 2025-08-11 RX ADMIN — OLANZAPINE 2.5 MG: 2.5 TABLET, FILM COATED ORAL at 21:31

## 2025-08-11 RX ADMIN — WATER 1000 MG: 1 INJECTION INTRAMUSCULAR; INTRAVENOUS; SUBCUTANEOUS at 17:39

## 2025-08-11 RX ADMIN — HEPARIN SODIUM 5000 UNITS: 5000 INJECTION INTRAVENOUS; SUBCUTANEOUS at 07:48

## 2025-08-11 RX ADMIN — CHLORPROMAZINE HYDROCHLORIDE 50 MG: 25 INJECTION INTRAMUSCULAR at 00:25

## 2025-08-11 ASSESSMENT — PAIN SCALES - GENERAL: PAINLEVEL_OUTOF10: 0

## 2025-08-11 ASSESSMENT — PAIN SCALES - WONG BAKER: WONGBAKER_NUMERICALRESPONSE: NO HURT

## 2025-08-12 PROBLEM — G93.41 ACUTE METABOLIC ENCEPHALOPATHY: Status: ACTIVE | Noted: 2025-08-12

## 2025-08-12 PROBLEM — E43 SEVERE MALNUTRITION: Status: ACTIVE | Noted: 2025-08-12

## 2025-08-12 LAB
ANION GAP SERPL CALC-SCNC: 15 MEQ/L (ref 8–16)
BUN SERPL-MCNC: 25 MG/DL (ref 8–23)
CALCIUM SERPL-MCNC: 9.2 MG/DL (ref 8.5–10.5)
CHLORIDE SERPL-SCNC: 98 MEQ/L (ref 98–111)
CO2 SERPL-SCNC: 23 MEQ/L (ref 22–29)
CREAT SERPL-MCNC: 0.9 MG/DL (ref 0.5–0.9)
DEPRECATED MEAN GLUCOSE BLD GHB EST-ACNC: 177 MG/DL (ref 70–126)
DEPRECATED RDW RBC AUTO: 44.2 FL (ref 35–45)
EKG ATRIAL RATE: 77 BPM
EKG P AXIS: 29 DEGREES
EKG P-R INTERVAL: 150 MS
EKG Q-T INTERVAL: 436 MS
EKG Q-T INTERVAL: 438 MS
EKG QRS DURATION: 132 MS
EKG QRS DURATION: 138 MS
EKG QTC CALCULATION (BAZETT): 493 MS
EKG QTC CALCULATION (BAZETT): 499 MS
EKG R AXIS: -3 DEGREES
EKG R AXIS: 1 DEGREES
EKG T AXIS: 128 DEGREES
EKG T AXIS: 144 DEGREES
EKG VENTRICULAR RATE: 77 BPM
EKG VENTRICULAR RATE: 78 BPM
ERYTHROCYTE [DISTWIDTH] IN BLOOD BY AUTOMATED COUNT: 12.8 % (ref 11.5–14.5)
GFR SERPL CREATININE-BSD FRML MDRD: 67 ML/MIN/1.73M2
GLUCOSE BLD STRIP.AUTO-MCNC: 147 MG/DL (ref 70–108)
GLUCOSE BLD STRIP.AUTO-MCNC: 149 MG/DL (ref 70–108)
GLUCOSE BLD STRIP.AUTO-MCNC: 187 MG/DL (ref 70–108)
GLUCOSE BLD STRIP.AUTO-MCNC: 188 MG/DL (ref 70–108)
GLUCOSE BLD STRIP.AUTO-MCNC: 232 MG/DL (ref 70–108)
GLUCOSE SERPL-MCNC: 133 MG/DL (ref 74–109)
HBA1C MFR BLD HPLC: 7.9 % (ref 4–6)
HCT VFR BLD AUTO: 33.5 % (ref 37–47)
HGB BLD-MCNC: 10.8 GM/DL (ref 12–16)
MAGNESIUM SERPL-MCNC: 2.2 MG/DL (ref 1.6–2.6)
MCH RBC QN AUTO: 30.8 PG (ref 26–33)
MCHC RBC AUTO-ENTMCNC: 32.2 GM/DL (ref 32.2–35.5)
MCV RBC AUTO: 95.4 FL (ref 81–99)
PLATELET # BLD AUTO: 140 THOU/MM3 (ref 130–400)
PMV BLD AUTO: 10.4 FL (ref 9.4–12.4)
POTASSIUM SERPL-SCNC: 3.7 MEQ/L (ref 3.5–5.2)
RBC # BLD AUTO: 3.51 MILL/MM3 (ref 4.2–5.4)
SODIUM SERPL-SCNC: 136 MEQ/L (ref 135–145)
WBC # BLD AUTO: 12.2 THOU/MM3 (ref 4.8–10.8)

## 2025-08-12 PROCEDURE — 2500000003 HC RX 250 WO HCPCS

## 2025-08-12 PROCEDURE — 96376 TX/PRO/DX INJ SAME DRUG ADON: CPT

## 2025-08-12 PROCEDURE — 96372 THER/PROPH/DIAG INJ SC/IM: CPT

## 2025-08-12 PROCEDURE — 93005 ELECTROCARDIOGRAM TRACING: CPT | Performed by: STUDENT IN AN ORGANIZED HEALTH CARE EDUCATION/TRAINING PROGRAM

## 2025-08-12 PROCEDURE — 1200000003 HC TELEMETRY R&B

## 2025-08-12 PROCEDURE — 6360000002 HC RX W HCPCS

## 2025-08-12 PROCEDURE — 6370000000 HC RX 637 (ALT 250 FOR IP)

## 2025-08-12 PROCEDURE — 36415 COLL VENOUS BLD VENIPUNCTURE: CPT

## 2025-08-12 PROCEDURE — 93010 ELECTROCARDIOGRAM REPORT: CPT | Performed by: NUCLEAR MEDICINE

## 2025-08-12 PROCEDURE — 80048 BASIC METABOLIC PNL TOTAL CA: CPT

## 2025-08-12 PROCEDURE — 85027 COMPLETE CBC AUTOMATED: CPT

## 2025-08-12 PROCEDURE — 83735 ASSAY OF MAGNESIUM: CPT

## 2025-08-12 PROCEDURE — 82948 REAGENT STRIP/BLOOD GLUCOSE: CPT

## 2025-08-12 PROCEDURE — 6370000000 HC RX 637 (ALT 250 FOR IP): Performed by: STUDENT IN AN ORGANIZED HEALTH CARE EDUCATION/TRAINING PROGRAM

## 2025-08-12 PROCEDURE — 99232 SBSQ HOSP IP/OBS MODERATE 35: CPT | Performed by: PSYCHIATRY & NEUROLOGY

## 2025-08-12 PROCEDURE — 93005 ELECTROCARDIOGRAM TRACING: CPT

## 2025-08-12 PROCEDURE — 83036 HEMOGLOBIN GLYCOSYLATED A1C: CPT

## 2025-08-12 PROCEDURE — 99233 SBSQ HOSP IP/OBS HIGH 50: CPT | Performed by: STUDENT IN AN ORGANIZED HEALTH CARE EDUCATION/TRAINING PROGRAM

## 2025-08-12 RX ORDER — HALOPERIDOL 5 MG/ML
2 INJECTION INTRAMUSCULAR EVERY 6 HOURS PRN
Status: DISCONTINUED | OUTPATIENT
Start: 2025-08-12 | End: 2025-08-14 | Stop reason: HOSPADM

## 2025-08-12 RX ORDER — LOXAPINE SUCCINATE 5 MG/1
10 TABLET ORAL 2 TIMES DAILY
Status: DISCONTINUED | OUTPATIENT
Start: 2025-08-12 | End: 2025-08-13

## 2025-08-12 RX ORDER — DIVALPROEX SODIUM 500 MG/1
500 TABLET, FILM COATED, EXTENDED RELEASE ORAL NIGHTLY
Status: DISCONTINUED | OUTPATIENT
Start: 2025-08-12 | End: 2025-08-14 | Stop reason: HOSPADM

## 2025-08-12 RX ORDER — CLONAZEPAM 0.5 MG/1
0.25 TABLET ORAL NIGHTLY
Status: DISCONTINUED | OUTPATIENT
Start: 2025-08-12 | End: 2025-08-14 | Stop reason: HOSPADM

## 2025-08-12 RX ORDER — OLANZAPINE 5 MG/1
5 TABLET, FILM COATED ORAL NIGHTLY
Status: DISCONTINUED | OUTPATIENT
Start: 2025-08-12 | End: 2025-08-12

## 2025-08-12 RX ADMIN — RIVASTIGMINE TARTRATE 6 MG: 1.5 CAPSULE ORAL at 09:02

## 2025-08-12 RX ADMIN — INSULIN LISPRO 4 UNITS: 100 INJECTION, SOLUTION INTRAVENOUS; SUBCUTANEOUS at 21:00

## 2025-08-12 RX ADMIN — HEPARIN SODIUM 5000 UNITS: 5000 INJECTION INTRAVENOUS; SUBCUTANEOUS at 09:01

## 2025-08-12 RX ADMIN — LEVOTHYROXINE SODIUM 112 MCG: 0.11 TABLET ORAL at 09:02

## 2025-08-12 RX ADMIN — PANTOPRAZOLE SODIUM 40 MG: 40 TABLET, DELAYED RELEASE ORAL at 09:02

## 2025-08-12 RX ADMIN — CLONAZEPAM 0.25 MG: 0.5 TABLET ORAL at 20:55

## 2025-08-12 RX ADMIN — RIVASTIGMINE TARTRATE 6 MG: 1.5 CAPSULE ORAL at 20:56

## 2025-08-12 RX ADMIN — MEMANTINE 5 MG: 5 TABLET ORAL at 09:02

## 2025-08-12 RX ADMIN — INSULIN GLARGINE 10 UNITS: 100 INJECTION, SOLUTION SUBCUTANEOUS at 20:59

## 2025-08-12 RX ADMIN — WATER 1000 MG: 1 INJECTION INTRAMUSCULAR; INTRAVENOUS; SUBCUTANEOUS at 17:58

## 2025-08-12 RX ADMIN — SODIUM CHLORIDE, PRESERVATIVE FREE 10 ML: 5 INJECTION INTRAVENOUS at 21:00

## 2025-08-12 RX ADMIN — POTASSIUM BICARBONATE 20 MEQ: 782 TABLET, EFFERVESCENT ORAL at 18:15

## 2025-08-12 RX ADMIN — HEPARIN SODIUM 5000 UNITS: 5000 INJECTION INTRAVENOUS; SUBCUTANEOUS at 20:59

## 2025-08-12 RX ADMIN — MEMANTINE 5 MG: 5 TABLET ORAL at 20:56

## 2025-08-12 RX ADMIN — DIVALPROEX SODIUM 500 MG: 500 TABLET, EXTENDED RELEASE ORAL at 20:56

## 2025-08-12 RX ADMIN — Medication 4.5 MG: at 20:56

## 2025-08-12 RX ADMIN — HALOPERIDOL LACTATE 2 MG: 5 INJECTION, SOLUTION INTRAMUSCULAR at 06:59

## 2025-08-12 RX ADMIN — LOXAPINE 10 MG: 5 CAPSULE ORAL at 20:56

## 2025-08-12 RX ADMIN — SODIUM CHLORIDE, PRESERVATIVE FREE 10 ML: 5 INJECTION INTRAVENOUS at 09:08

## 2025-08-12 RX ADMIN — Medication 400 MG: at 09:02

## 2025-08-12 ASSESSMENT — PAIN SCALES - GENERAL
PAINLEVEL_OUTOF10: 0
PAINLEVEL_OUTOF10: 0

## 2025-08-13 LAB
ANION GAP SERPL CALC-SCNC: 11 MEQ/L (ref 8–16)
BACTERIA UR CULT: ABNORMAL
BUN SERPL-MCNC: 18 MG/DL (ref 8–23)
CALCIUM SERPL-MCNC: 9.4 MG/DL (ref 8.5–10.5)
CHLORIDE SERPL-SCNC: 100 MEQ/L (ref 98–111)
CO2 SERPL-SCNC: 27 MEQ/L (ref 22–29)
CREAT SERPL-MCNC: 1 MG/DL (ref 0.5–0.9)
DEPRECATED RDW RBC AUTO: 46.2 FL (ref 35–45)
EKG ATRIAL RATE: 76 BPM
EKG P-R INTERVAL: 164 MS
EKG Q-T INTERVAL: 416 MS
EKG QRS DURATION: 140 MS
EKG QTC CALCULATION (BAZETT): 468 MS
EKG R AXIS: -10 DEGREES
EKG T AXIS: 140 DEGREES
EKG VENTRICULAR RATE: 76 BPM
ERYTHROCYTE [DISTWIDTH] IN BLOOD BY AUTOMATED COUNT: 13 % (ref 11.5–14.5)
GFR SERPL CREATININE-BSD FRML MDRD: 59 ML/MIN/1.73M2
GLUCOSE BLD STRIP.AUTO-MCNC: 117 MG/DL (ref 70–108)
GLUCOSE BLD STRIP.AUTO-MCNC: 141 MG/DL (ref 70–108)
GLUCOSE BLD STRIP.AUTO-MCNC: 274 MG/DL (ref 70–108)
GLUCOSE BLD STRIP.AUTO-MCNC: 370 MG/DL (ref 70–108)
GLUCOSE BLD STRIP.AUTO-MCNC: 411 MG/DL (ref 70–108)
GLUCOSE SERPL-MCNC: 102 MG/DL (ref 74–109)
HCT VFR BLD AUTO: 38.4 % (ref 37–47)
HGB BLD-MCNC: 12.3 GM/DL (ref 12–16)
MAGNESIUM SERPL-MCNC: 2.5 MG/DL (ref 1.6–2.6)
MCH RBC QN AUTO: 31.1 PG (ref 26–33)
MCHC RBC AUTO-ENTMCNC: 32 GM/DL (ref 32.2–35.5)
MCV RBC AUTO: 97 FL (ref 81–99)
ORGANISM: ABNORMAL
PLATELET # BLD AUTO: 151 THOU/MM3 (ref 130–400)
PMV BLD AUTO: 10.3 FL (ref 9.4–12.4)
POTASSIUM SERPL-SCNC: 3.7 MEQ/L (ref 3.5–5.2)
RBC # BLD AUTO: 3.96 MILL/MM3 (ref 4.2–5.4)
SODIUM SERPL-SCNC: 138 MEQ/L (ref 135–145)
WBC # BLD AUTO: 12.9 THOU/MM3 (ref 4.8–10.8)

## 2025-08-13 PROCEDURE — 1200000003 HC TELEMETRY R&B

## 2025-08-13 PROCEDURE — 6370000000 HC RX 637 (ALT 250 FOR IP)

## 2025-08-13 PROCEDURE — 83735 ASSAY OF MAGNESIUM: CPT

## 2025-08-13 PROCEDURE — 2500000003 HC RX 250 WO HCPCS

## 2025-08-13 PROCEDURE — 99232 SBSQ HOSP IP/OBS MODERATE 35: CPT | Performed by: PSYCHIATRY & NEUROLOGY

## 2025-08-13 PROCEDURE — 93005 ELECTROCARDIOGRAM TRACING: CPT

## 2025-08-13 PROCEDURE — 82948 REAGENT STRIP/BLOOD GLUCOSE: CPT

## 2025-08-13 PROCEDURE — 93010 ELECTROCARDIOGRAM REPORT: CPT | Performed by: NUCLEAR MEDICINE

## 2025-08-13 PROCEDURE — 6370000000 HC RX 637 (ALT 250 FOR IP): Performed by: STUDENT IN AN ORGANIZED HEALTH CARE EDUCATION/TRAINING PROGRAM

## 2025-08-13 PROCEDURE — 85027 COMPLETE CBC AUTOMATED: CPT

## 2025-08-13 PROCEDURE — 80048 BASIC METABOLIC PNL TOTAL CA: CPT

## 2025-08-13 PROCEDURE — 6360000002 HC RX W HCPCS

## 2025-08-13 PROCEDURE — 36415 COLL VENOUS BLD VENIPUNCTURE: CPT

## 2025-08-13 RX ORDER — LOXAPINE SUCCINATE 5 MG/1
10 TABLET ORAL NIGHTLY
Status: DISCONTINUED | OUTPATIENT
Start: 2025-08-14 | End: 2025-08-14 | Stop reason: HOSPADM

## 2025-08-13 RX ORDER — INSULIN LISPRO 100 [IU]/ML
0-8 INJECTION, SOLUTION INTRAVENOUS; SUBCUTANEOUS
Status: DISCONTINUED | OUTPATIENT
Start: 2025-08-13 | End: 2025-08-14 | Stop reason: HOSPADM

## 2025-08-13 RX ORDER — INSULIN GLARGINE 100 [IU]/ML
20 INJECTION, SOLUTION SUBCUTANEOUS NIGHTLY
Status: DISCONTINUED | OUTPATIENT
Start: 2025-08-13 | End: 2025-08-14 | Stop reason: HOSPADM

## 2025-08-13 RX ORDER — INSULIN LISPRO 100 [IU]/ML
0-16 INJECTION, SOLUTION INTRAVENOUS; SUBCUTANEOUS
Status: DISCONTINUED | OUTPATIENT
Start: 2025-08-13 | End: 2025-08-13

## 2025-08-13 RX ORDER — INSULIN LISPRO 100 [IU]/ML
3 INJECTION, SOLUTION INTRAVENOUS; SUBCUTANEOUS
Status: DISCONTINUED | OUTPATIENT
Start: 2025-08-13 | End: 2025-08-14

## 2025-08-13 RX ADMIN — LOXAPINE 10 MG: 5 CAPSULE ORAL at 07:49

## 2025-08-13 RX ADMIN — CLONAZEPAM 0.25 MG: 0.5 TABLET ORAL at 20:08

## 2025-08-13 RX ADMIN — HEPARIN SODIUM 5000 UNITS: 5000 INJECTION INTRAVENOUS; SUBCUTANEOUS at 20:09

## 2025-08-13 RX ADMIN — SODIUM CHLORIDE, PRESERVATIVE FREE 10 ML: 5 INJECTION INTRAVENOUS at 07:50

## 2025-08-13 RX ADMIN — ACETAMINOPHEN 650 MG: 650 SUPPOSITORY RECTAL at 15:58

## 2025-08-13 RX ADMIN — DIVALPROEX SODIUM 500 MG: 500 TABLET, EXTENDED RELEASE ORAL at 20:08

## 2025-08-13 RX ADMIN — RIVASTIGMINE TARTRATE 6 MG: 1.5 CAPSULE ORAL at 07:49

## 2025-08-13 RX ADMIN — MEMANTINE 5 MG: 5 TABLET ORAL at 07:49

## 2025-08-13 RX ADMIN — INSULIN GLARGINE 20 UNITS: 100 INJECTION, SOLUTION SUBCUTANEOUS at 20:08

## 2025-08-13 RX ADMIN — PANTOPRAZOLE SODIUM 40 MG: 40 TABLET, DELAYED RELEASE ORAL at 07:49

## 2025-08-13 RX ADMIN — Medication 4.5 MG: at 20:09

## 2025-08-13 RX ADMIN — INSULIN LISPRO 16 UNITS: 100 INJECTION, SOLUTION INTRAVENOUS; SUBCUTANEOUS at 16:30

## 2025-08-13 RX ADMIN — Medication 400 MG: at 07:49

## 2025-08-13 RX ADMIN — INSULIN LISPRO 16 UNITS: 100 INJECTION, SOLUTION INTRAVENOUS; SUBCUTANEOUS at 12:17

## 2025-08-13 RX ADMIN — SODIUM CHLORIDE, PRESERVATIVE FREE 10 ML: 5 INJECTION INTRAVENOUS at 20:09

## 2025-08-13 RX ADMIN — INSULIN LISPRO 3 UNITS: 100 INJECTION, SOLUTION INTRAVENOUS; SUBCUTANEOUS at 17:19

## 2025-08-13 RX ADMIN — LEVOTHYROXINE SODIUM 112 MCG: 0.11 TABLET ORAL at 06:41

## 2025-08-13 RX ADMIN — RIVASTIGMINE TARTRATE 6 MG: 1.5 CAPSULE ORAL at 20:08

## 2025-08-13 RX ADMIN — MEMANTINE 5 MG: 5 TABLET ORAL at 20:08

## 2025-08-13 RX ADMIN — HEPARIN SODIUM 5000 UNITS: 5000 INJECTION INTRAVENOUS; SUBCUTANEOUS at 07:49

## 2025-08-13 ASSESSMENT — PAIN SCALES - WONG BAKER: WONGBAKER_NUMERICALRESPONSE: NO HURT

## 2025-08-13 ASSESSMENT — PAIN SCALES - GENERAL
PAINLEVEL_OUTOF10: 0
PAINLEVEL_OUTOF10: 0

## 2025-08-14 VITALS
BODY MASS INDEX: 22.73 KG/M2 | RESPIRATION RATE: 16 BRPM | WEIGHT: 105.38 LBS | HEIGHT: 57 IN | HEART RATE: 78 BPM | OXYGEN SATURATION: 99 % | SYSTOLIC BLOOD PRESSURE: 120 MMHG | DIASTOLIC BLOOD PRESSURE: 62 MMHG | TEMPERATURE: 97.8 F

## 2025-08-14 LAB
ALBUMIN SERPL BCG-MCNC: 2.8 G/DL (ref 3.4–4.9)
ALP SERPL-CCNC: 82 U/L (ref 38–126)
ALT SERPL W/O P-5'-P-CCNC: 34 U/L (ref 10–35)
ANION GAP SERPL CALC-SCNC: 12 MEQ/L (ref 8–16)
AST SERPL-CCNC: 39 U/L (ref 10–35)
BACTERIA BLD AEROBE CULT: NORMAL
BACTERIA BLD AEROBE CULT: NORMAL
BILIRUB SERPL-MCNC: 0.6 MG/DL (ref 0.3–1.2)
BUN SERPL-MCNC: 15 MG/DL (ref 8–23)
CALCIUM SERPL-MCNC: 9.2 MG/DL (ref 8.5–10.5)
CHLORIDE SERPL-SCNC: 97 MEQ/L (ref 98–111)
CO2 SERPL-SCNC: 28 MEQ/L (ref 22–29)
CREAT SERPL-MCNC: 0.9 MG/DL (ref 0.5–0.9)
DEPRECATED RDW RBC AUTO: 43 FL (ref 35–45)
ERYTHROCYTE [DISTWIDTH] IN BLOOD BY AUTOMATED COUNT: 12.7 % (ref 11.5–14.5)
GFR SERPL CREATININE-BSD FRML MDRD: 67 ML/MIN/1.73M2
GLUCOSE BLD STRIP.AUTO-MCNC: 113 MG/DL (ref 70–108)
GLUCOSE SERPL-MCNC: 111 MG/DL (ref 74–109)
HCT VFR BLD AUTO: 36 % (ref 37–47)
HGB BLD-MCNC: 12 GM/DL (ref 12–16)
MCH RBC QN AUTO: 31 PG (ref 26–33)
MCHC RBC AUTO-ENTMCNC: 33.3 GM/DL (ref 32.2–35.5)
MCV RBC AUTO: 93 FL (ref 81–99)
PLATELET # BLD AUTO: 168 THOU/MM3 (ref 130–400)
PMV BLD AUTO: 10.7 FL (ref 9.4–12.4)
POTASSIUM SERPL-SCNC: 3.4 MEQ/L (ref 3.5–5.2)
PROT SERPL-MCNC: 6.4 G/DL (ref 6.4–8.3)
RBC # BLD AUTO: 3.87 MILL/MM3 (ref 4.2–5.4)
SODIUM SERPL-SCNC: 137 MEQ/L (ref 135–145)
WBC # BLD AUTO: 15 THOU/MM3 (ref 4.8–10.8)

## 2025-08-14 PROCEDURE — 36415 COLL VENOUS BLD VENIPUNCTURE: CPT

## 2025-08-14 PROCEDURE — 85027 COMPLETE CBC AUTOMATED: CPT

## 2025-08-14 PROCEDURE — 6370000000 HC RX 637 (ALT 250 FOR IP)

## 2025-08-14 PROCEDURE — 6360000002 HC RX W HCPCS

## 2025-08-14 PROCEDURE — 82948 REAGENT STRIP/BLOOD GLUCOSE: CPT

## 2025-08-14 PROCEDURE — 2500000003 HC RX 250 WO HCPCS

## 2025-08-14 PROCEDURE — 80053 COMPREHEN METABOLIC PANEL: CPT

## 2025-08-14 RX ORDER — CLONAZEPAM 0.5 MG/1
0.25 TABLET ORAL NIGHTLY
Refills: 0 | Status: SHIPPED | DISCHARGE
Start: 2025-08-14 | End: 2025-08-17

## 2025-08-14 RX ORDER — LOXAPINE SUCCINATE 10 MG/1
TABLET ORAL
Qty: 21 CAPSULE | Refills: 0 | DISCHARGE
Start: 2025-08-14

## 2025-08-14 RX ORDER — DIVALPROEX SODIUM 500 MG/1
500 TABLET, FILM COATED, EXTENDED RELEASE ORAL NIGHTLY
DISCHARGE
Start: 2025-08-14

## 2025-08-14 RX ADMIN — Medication 400 MG: at 07:47

## 2025-08-14 RX ADMIN — RIVASTIGMINE TARTRATE 6 MG: 1.5 CAPSULE ORAL at 07:43

## 2025-08-14 RX ADMIN — LEVOTHYROXINE SODIUM 112 MCG: 0.11 TABLET ORAL at 05:56

## 2025-08-14 RX ADMIN — HEPARIN SODIUM 5000 UNITS: 5000 INJECTION INTRAVENOUS; SUBCUTANEOUS at 07:43

## 2025-08-14 RX ADMIN — POTASSIUM BICARBONATE 20 MEQ: 782 TABLET, EFFERVESCENT ORAL at 10:21

## 2025-08-14 RX ADMIN — MEMANTINE 5 MG: 5 TABLET ORAL at 07:47

## 2025-08-14 RX ADMIN — SODIUM CHLORIDE, PRESERVATIVE FREE 10 ML: 5 INJECTION INTRAVENOUS at 07:47

## 2025-08-14 RX ADMIN — PANTOPRAZOLE SODIUM 40 MG: 40 TABLET, DELAYED RELEASE ORAL at 07:43

## 2025-08-23 PROBLEM — R53.1 GENERALIZED WEAKNESS: Status: ACTIVE | Noted: 2025-08-23

## 2025-08-23 PROBLEM — D72.829 LEUKOCYTOSIS: Status: ACTIVE | Noted: 2025-08-23

## 2025-08-23 PROBLEM — D53.9 MACROCYTIC ANEMIA: Status: ACTIVE | Noted: 2025-08-23

## 2025-08-23 PROBLEM — F05 DELIRIUM SUPERIMPOSED ON DEMENTIA: Status: ACTIVE | Noted: 2025-08-11

## 2025-08-23 PROBLEM — R45.1 AGITATION: Status: ACTIVE | Noted: 2025-08-23

## 2025-08-23 PROBLEM — R50.9 FEVER: Status: ACTIVE | Noted: 2025-08-23

## 2025-08-23 PROBLEM — Z79.4 TYPE 2 DIABETES MELLITUS WITH INSULIN THERAPY (HCC): Status: ACTIVE | Noted: 2025-08-23

## 2025-08-23 PROBLEM — G47.00 INSOMNIA: Status: ACTIVE | Noted: 2025-08-23

## 2025-08-23 PROBLEM — N18.32 CKD STAGE 3B, GFR 30-44 ML/MIN (HCC): Status: ACTIVE | Noted: 2025-08-23

## 2025-08-23 PROBLEM — E03.9 HYPOTHYROIDISM: Status: ACTIVE | Noted: 2025-08-23

## 2025-08-23 PROBLEM — N17.9 AKI (ACUTE KIDNEY INJURY): Status: ACTIVE | Noted: 2025-08-23

## 2025-08-23 PROBLEM — E11.9 TYPE 2 DIABETES MELLITUS WITH INSULIN THERAPY (HCC): Status: ACTIVE | Noted: 2025-08-23

## 2025-08-23 PROBLEM — E08.42 DIABETIC POLYNEUROPATHY ASSOCIATED WITH DIABETES MELLITUS DUE TO UNDERLYING CONDITION (HCC): Status: ACTIVE | Noted: 2025-08-23
